# Patient Record
Sex: FEMALE | Race: BLACK OR AFRICAN AMERICAN | NOT HISPANIC OR LATINO | Employment: OTHER | URBAN - METROPOLITAN AREA
[De-identification: names, ages, dates, MRNs, and addresses within clinical notes are randomized per-mention and may not be internally consistent; named-entity substitution may affect disease eponyms.]

---

## 2023-04-26 ENCOUNTER — HOSPITAL ENCOUNTER (OUTPATIENT)
Dept: DATA CONVERSION | Facility: HOSPITAL | Age: 63
End: 2023-04-26
Attending: INTERNAL MEDICINE | Admitting: INTERNAL MEDICINE

## 2023-04-26 DIAGNOSIS — D12.2 BENIGN NEOPLASM OF ASCENDING COLON: ICD-10-CM

## 2023-04-26 DIAGNOSIS — J44.9 CHRONIC OBSTRUCTIVE PULMONARY DISEASE, UNSPECIFIED (MULTI): ICD-10-CM

## 2023-04-26 DIAGNOSIS — Z88.0 ALLERGY STATUS TO PENICILLIN: ICD-10-CM

## 2023-04-26 DIAGNOSIS — Z86.010 PERSONAL HISTORY OF COLONIC POLYPS: ICD-10-CM

## 2023-04-26 DIAGNOSIS — E03.9 HYPOTHYROIDISM, UNSPECIFIED: ICD-10-CM

## 2023-04-26 DIAGNOSIS — G47.33 OBSTRUCTIVE SLEEP APNEA (ADULT) (PEDIATRIC): ICD-10-CM

## 2023-04-26 DIAGNOSIS — Z86.73 PERSONAL HISTORY OF TRANSIENT ISCHEMIC ATTACK (TIA), AND CEREBRAL INFARCTION WITHOUT RESIDUAL DEFICITS: ICD-10-CM

## 2023-04-26 DIAGNOSIS — Z12.11 ENCOUNTER FOR SCREENING FOR MALIGNANT NEOPLASM OF COLON: ICD-10-CM

## 2023-04-26 DIAGNOSIS — Z15.09 GENETIC SUSCEPTIBILITY TO OTHER MALIGNANT NEOPLASM: ICD-10-CM

## 2023-04-26 DIAGNOSIS — F41.9 ANXIETY DISORDER, UNSPECIFIED: ICD-10-CM

## 2023-04-26 DIAGNOSIS — F32.A DEPRESSION, UNSPECIFIED: ICD-10-CM

## 2023-04-26 DIAGNOSIS — E66.01 MORBID (SEVERE) OBESITY DUE TO EXCESS CALORIES (MULTI): ICD-10-CM

## 2023-04-26 DIAGNOSIS — K64.0 FIRST DEGREE HEMORRHOIDS: ICD-10-CM

## 2023-05-02 LAB
COMPLETE PATHOLOGY REPORT: NORMAL
CONVERTED CLINICAL DIAGNOSIS-HISTORY: NORMAL
CONVERTED FINAL DIAGNOSIS: NORMAL
CONVERTED FINAL REPORT PDF LINK TO COPY AND PASTE: NORMAL
CONVERTED GROSS DESCRIPTION: NORMAL

## 2023-05-22 ENCOUNTER — HOSPITAL ENCOUNTER (OUTPATIENT)
Dept: DATA CONVERSION | Facility: HOSPITAL | Age: 63
End: 2023-05-22
Attending: INTERNAL MEDICINE | Admitting: INTERNAL MEDICINE

## 2023-05-22 DIAGNOSIS — G47.33 OBSTRUCTIVE SLEEP APNEA (ADULT) (PEDIATRIC): ICD-10-CM

## 2023-05-22 DIAGNOSIS — K63.5 POLYP OF COLON: ICD-10-CM

## 2023-05-22 DIAGNOSIS — Z86.73 PERSONAL HISTORY OF TRANSIENT ISCHEMIC ATTACK (TIA), AND CEREBRAL INFARCTION WITHOUT RESIDUAL DEFICITS: ICD-10-CM

## 2023-05-22 DIAGNOSIS — F41.8 OTHER SPECIFIED ANXIETY DISORDERS: ICD-10-CM

## 2023-05-22 DIAGNOSIS — F31.9 BIPOLAR DISORDER, UNSPECIFIED (MULTI): ICD-10-CM

## 2023-05-22 DIAGNOSIS — E66.01 MORBID (SEVERE) OBESITY DUE TO EXCESS CALORIES (MULTI): ICD-10-CM

## 2023-05-22 DIAGNOSIS — Q43.8 OTHER SPECIFIED CONGENITAL MALFORMATIONS OF INTESTINE: ICD-10-CM

## 2023-05-22 DIAGNOSIS — K44.9 DIAPHRAGMATIC HERNIA WITHOUT OBSTRUCTION OR GANGRENE: ICD-10-CM

## 2023-05-22 DIAGNOSIS — K64.9 UNSPECIFIED HEMORRHOIDS: ICD-10-CM

## 2023-05-22 DIAGNOSIS — E03.9 HYPOTHYROIDISM, UNSPECIFIED: ICD-10-CM

## 2023-05-22 DIAGNOSIS — K21.9 GASTRO-ESOPHAGEAL REFLUX DISEASE WITHOUT ESOPHAGITIS: ICD-10-CM

## 2023-05-22 DIAGNOSIS — K31.89 OTHER DISEASES OF STOMACH AND DUODENUM: ICD-10-CM

## 2023-05-22 DIAGNOSIS — I42.9 CARDIOMYOPATHY, UNSPECIFIED (MULTI): ICD-10-CM

## 2023-05-22 DIAGNOSIS — K21.00 GASTRO-ESOPHAGEAL REFLUX DISEASE WITH ESOPHAGITIS, WITHOUT BLEEDING: ICD-10-CM

## 2023-05-22 DIAGNOSIS — Z85.3 PERSONAL HISTORY OF MALIGNANT NEOPLASM OF BREAST: ICD-10-CM

## 2023-05-22 DIAGNOSIS — E78.00 PURE HYPERCHOLESTEROLEMIA, UNSPECIFIED: ICD-10-CM

## 2023-05-22 DIAGNOSIS — J44.9 CHRONIC OBSTRUCTIVE PULMONARY DISEASE, UNSPECIFIED (MULTI): ICD-10-CM

## 2023-05-22 DIAGNOSIS — Z98.84 BARIATRIC SURGERY STATUS: ICD-10-CM

## 2023-09-07 VITALS
TEMPERATURE: 97.9 F | RESPIRATION RATE: 18 BRPM | HEART RATE: 67 BPM | SYSTOLIC BLOOD PRESSURE: 125 MMHG | DIASTOLIC BLOOD PRESSURE: 89 MMHG

## 2023-09-07 VITALS
HEART RATE: 74 BPM | TEMPERATURE: 97.5 F | SYSTOLIC BLOOD PRESSURE: 105 MMHG | RESPIRATION RATE: 16 BRPM | DIASTOLIC BLOOD PRESSURE: 73 MMHG

## 2023-09-14 NOTE — H&P
History of Present Illness:   History Present Illness:  Reason for surgery: Positive mutation with MUTYH   HPI:    Patient was referred to GI for colonoscopy due to Positive mutation with MUTYH/history of colon polyp    Allergies:        Allergies:  ·  amoxicillin : Itching  ·  tetanus  immune globulin: Swelling/Edema    Home Medication Review:   Home Medications Reviewed: yes     Impression/Procedure:   ·  Impression and Planned Procedure: Colonoscopy       ERAS (Enhanced Recovery After Surgery):  ·  ERAS Patient: no       Vital Signs:  Temperature C: 36.4 degrees C   Temperature F: 97.5 degrees F   Heart Rate: 74 beats per minute   Respiratory Rate: 16 breath per minute   Blood Pressure Systolic: 105 mm/Hg   Blood Pressure Diastolic: 73 mm/Hg     Physical Exam by System:    Respiratory/Thorax: Patent airways, CTAB, normal  breath sounds with good chest expansion, thorax symmetric   Cardiovascular: Regular, rate and rhythm, no murmurs,  2+ equal pulses of the extremities, normal S 1and S 2     Consent:   COVID-19 Consent:  ·  COVID-19 Risk Consent Surgeon has reviewed key risks related to the risk of ila COVID-19 and if they contract COVID-19 what the risks are.       Electronic Signatures:  Genevieve Villa)  (Signed 26-Apr-2023 11:42)   Authored: History of Present Illness, Allergies, Home  Medication Review, Impression/Procedure, ERAS, Physical Exam, Consent, Note Completion      Last Updated: 26-Apr-2023 11:42 by Genevieve Villa)

## 2023-09-30 NOTE — H&P
History of Present Illness:   History Present Illness:  Reason for surgery: Mild GERD, SP sleeve gastrectomy,  positive mutation of MUTYH gene   HPI:    Patient with mild GERD, SP sleeve gastrectomy, positive mutation of MUTYH gene, scheduled for EGD to rule out gastroduodenal polyp    Allergies:        Allergies:  ·  amoxicillin : Itching  ·  tetanus  immune globulin: Swelling/Edema    Home Medication Review:   Home Medications Reviewed: yes     Impression/Procedure:   ·  Impression and Planned Procedure: EGD       ERAS (Enhanced Recovery After Surgery):  ·  ERAS Patient: no       Vital Signs:  Temperature C: 36.6 degrees C   Temperature F: 97.8 degrees F   Heart Rate: 67 beats per minute   Respiratory Rate: 18 breath per minute   Blood Pressure Systolic: 125 mm/Hg   Blood Pressure Diastolic: 89 mm/Hg     Physical Exam by System:    Respiratory/Thorax: Patent airways, CTAB, normal  breath sounds with good chest expansion, thorax symmetric   Cardiovascular: Regular, rate and rhythm, no murmurs,  2+ equal pulses of the extremities, normal S 1and S 2     Consent:   COVID-19 Consent:  ·  COVID-19 Risk Consent Surgeon has reviewed key risks related to the risk of ila COVID-19 and if they contract COVID-19 what the risks are.       Electronic Signatures:  Genevieve Villa)  (Signed 22-May-2023 13:48)   Authored: History of Present Illness, Allergies, Home  Medication Review, Impression/Procedure, ERAS, Physical Exam, Consent, Note Completion      Last Updated: 22-May-2023 13:48 by Genevieve Villa)

## 2024-03-13 PROBLEM — R87.619 ABNORMAL PAP SMEAR OF CERVIX: Status: ACTIVE | Noted: 2024-03-13

## 2024-03-13 PROBLEM — I67.9 CEREBROVASCULAR DISEASE: Status: ACTIVE | Noted: 2024-03-13

## 2024-03-13 PROBLEM — I10 HYPERTENSION: Status: ACTIVE | Noted: 2024-03-13

## 2024-03-13 PROBLEM — M70.21 OLECRANON BURSITIS OF RIGHT ELBOW: Status: ACTIVE | Noted: 2024-03-13

## 2024-03-13 PROBLEM — R42 VERTIGO: Status: ACTIVE | Noted: 2024-03-13

## 2024-03-13 PROBLEM — R55 SYNCOPE: Status: ACTIVE | Noted: 2024-03-13

## 2024-03-13 PROBLEM — T63.441A BEE STING: Status: ACTIVE | Noted: 2024-03-13

## 2024-03-13 PROBLEM — R82.90 ABNORMAL URINALYSIS: Status: ACTIVE | Noted: 2024-03-13

## 2024-03-13 PROBLEM — H53.9 DISORDER OF VISION: Status: ACTIVE | Noted: 2024-03-13

## 2024-03-13 PROBLEM — R06.00 DYSPNEA: Status: ACTIVE | Noted: 2024-03-13

## 2024-03-13 PROBLEM — J20.9 ACUTE BRONCHITIS: Status: ACTIVE | Noted: 2024-03-13

## 2024-03-13 PROBLEM — R60.9 EDEMA: Status: ACTIVE | Noted: 2024-03-13

## 2024-03-13 PROBLEM — D69.2: Status: ACTIVE | Noted: 2024-03-13

## 2024-03-13 PROBLEM — N95.0 POSTMENOPAUSAL BLEEDING: Status: ACTIVE | Noted: 2024-03-13

## 2024-03-13 PROBLEM — M00.9: Status: ACTIVE | Noted: 2024-03-13

## 2024-03-13 PROBLEM — G47.33 OSA (OBSTRUCTIVE SLEEP APNEA): Status: ACTIVE | Noted: 2024-03-13

## 2024-03-13 PROBLEM — D32.9 MENINGIOMA (MULTI): Status: ACTIVE | Noted: 2024-03-13

## 2024-03-13 PROBLEM — N20.0 BILATERAL KIDNEY STONES: Status: ACTIVE | Noted: 2024-03-13

## 2024-03-13 PROBLEM — R20.0 FACIAL NUMBNESS: Status: ACTIVE | Noted: 2024-03-13

## 2024-03-13 PROBLEM — R11.2 NAUSEA WITH VOMITING: Status: ACTIVE | Noted: 2024-03-13

## 2024-03-13 PROBLEM — Z98.84 S/P BARIATRIC SURGERY: Status: ACTIVE | Noted: 2024-03-13

## 2024-03-13 PROBLEM — M54.2 CERVICALGIA: Status: ACTIVE | Noted: 2024-03-13

## 2024-03-13 PROBLEM — L03.90 CELLULITIS: Status: ACTIVE | Noted: 2024-03-13

## 2024-03-13 PROBLEM — R53.1 ASTHENIA: Status: ACTIVE | Noted: 2024-03-13

## 2024-03-13 PROBLEM — R07.9 CHEST PAIN: Status: ACTIVE | Noted: 2024-03-13

## 2024-03-13 PROBLEM — R31.9 HEMATURIA: Status: ACTIVE | Noted: 2024-03-13

## 2024-03-13 PROBLEM — B97.7 HUMAN PAPILLOMA VIRUS (HPV) INFECTION: Status: ACTIVE | Noted: 2024-03-13

## 2024-03-13 PROBLEM — R30.0 DYSURIA: Status: ACTIVE | Noted: 2024-03-13

## 2024-03-13 PROBLEM — R19.7 DIARRHEA: Status: ACTIVE | Noted: 2024-03-13

## 2024-03-13 PROBLEM — M94.9 DISORDER OF BONE AND ARTICULAR CARTILAGE: Status: ACTIVE | Noted: 2024-03-13

## 2024-03-13 PROBLEM — L02.429 BOIL, AXILLA: Status: ACTIVE | Noted: 2024-03-13

## 2024-03-13 PROBLEM — E53.8 VITAMIN B12 DEFICIENCY: Status: ACTIVE | Noted: 2024-03-13

## 2024-03-13 PROBLEM — C50.919 BREAST CANCER (MULTI): Status: ACTIVE | Noted: 2024-03-13

## 2024-03-13 PROBLEM — G45.9 TIA (TRANSIENT ISCHEMIC ATTACK): Status: ACTIVE | Noted: 2023-08-02

## 2024-03-13 PROBLEM — K62.5 BRIGHT RED BLOOD PER RECTUM: Status: ACTIVE | Noted: 2024-03-13

## 2024-03-13 PROBLEM — F17.200 NICOTINE DEPENDENCE: Status: ACTIVE | Noted: 2024-03-13

## 2024-03-13 PROBLEM — F32.A DEPRESSION: Status: ACTIVE | Noted: 2024-03-13

## 2024-03-13 PROBLEM — R35.0 URINARY FREQUENCY: Status: ACTIVE | Noted: 2024-03-13

## 2024-03-13 PROBLEM — I51.7 ENLARGED HEART: Status: ACTIVE | Noted: 2024-03-13

## 2024-03-13 PROBLEM — E55.9 MILD VITAMIN D DEFICIENCY: Status: ACTIVE | Noted: 2024-03-13

## 2024-03-13 PROBLEM — F41.9 ANXIETY: Status: ACTIVE | Noted: 2024-03-13

## 2024-03-13 PROBLEM — I42.9 CARDIOMYOPATHY (MULTI): Status: ACTIVE | Noted: 2024-03-13

## 2024-03-13 PROBLEM — M89.9 DISORDER OF BONE AND ARTICULAR CARTILAGE: Status: ACTIVE | Noted: 2024-03-13

## 2024-03-13 PROBLEM — R06.83 SNORING: Status: ACTIVE | Noted: 2024-03-13

## 2024-03-13 PROBLEM — E66.9 OBESITY: Status: ACTIVE | Noted: 2024-03-13

## 2024-03-13 PROBLEM — E78.00 HYPERCHOLESTEREMIA: Status: ACTIVE | Noted: 2024-03-13
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
## 2024-03-13 PROBLEM — J44.9 COPD (CHRONIC OBSTRUCTIVE PULMONARY DISEASE) (MULTI): Status: ACTIVE | Noted: 2024-03-13

## 2024-03-13 PROBLEM — K21.9 GASTROESOPHAGEAL REFLUX DISEASE: Status: ACTIVE | Noted: 2024-03-13

## 2024-03-13 PROBLEM — R87.810 CERVICAL HIGH RISK HPV (HUMAN PAPILLOMAVIRUS) TEST POSITIVE: Status: ACTIVE | Noted: 2024-03-13

## 2024-03-13 PROBLEM — E03.9 HYPOTHYROIDISM: Status: ACTIVE | Noted: 2024-03-13

## 2024-03-13 RX ORDER — ATORVASTATIN CALCIUM 80 MG/1
TABLET, FILM COATED ORAL
COMMUNITY
End: 2024-03-25 | Stop reason: ALTCHOICE

## 2024-03-13 RX ORDER — LEVOFLOXACIN 250 MG/1
TABLET ORAL
COMMUNITY
Start: 2022-09-28 | End: 2024-03-25 | Stop reason: ALTCHOICE

## 2024-03-13 RX ORDER — ATORVASTATIN CALCIUM 20 MG/1
1 TABLET, FILM COATED ORAL DAILY
COMMUNITY
Start: 2022-04-18 | End: 2024-03-25 | Stop reason: ALTCHOICE

## 2024-03-13 RX ORDER — LOSARTAN POTASSIUM 25 MG/1
1 TABLET ORAL DAILY
COMMUNITY
Start: 2021-08-17 | End: 2024-03-25 | Stop reason: ALTCHOICE

## 2024-03-13 RX ORDER — VIT C/E/ZN/COPPR/LUTEIN/ZEAXAN 250MG-90MG
CAPSULE ORAL
COMMUNITY
End: 2024-03-25 | Stop reason: ALTCHOICE

## 2024-03-13 RX ORDER — ACETAMINOPHEN 500 MG
TABLET ORAL 2 TIMES DAILY
COMMUNITY
End: 2024-03-25 | Stop reason: ALTCHOICE

## 2024-03-13 RX ORDER — MECLIZINE HYDROCHLORIDE 25 MG/1
1 TABLET ORAL 3 TIMES DAILY
COMMUNITY
Start: 2022-04-18 | End: 2024-03-25 | Stop reason: ALTCHOICE

## 2024-03-13 RX ORDER — ATORVASTATIN CALCIUM 40 MG/1
40 TABLET, FILM COATED ORAL
COMMUNITY
Start: 2023-08-04 | End: 2024-03-25 | Stop reason: ALTCHOICE

## 2024-03-13 RX ORDER — LURASIDONE HYDROCHLORIDE 40 MG/1
TABLET, FILM COATED ORAL
COMMUNITY
End: 2024-03-25 | Stop reason: ALTCHOICE

## 2024-03-13 RX ORDER — MULTIVIT WITH IRON,MINERALS
TABLET,CHEWABLE ORAL
COMMUNITY
End: 2024-03-25 | Stop reason: ALTCHOICE

## 2024-03-13 RX ORDER — SUMATRIPTAN 50 MG/1
TABLET, FILM COATED ORAL
COMMUNITY
Start: 2019-05-29 | End: 2024-03-25 | Stop reason: ALTCHOICE

## 2024-03-13 RX ORDER — NITROFURANTOIN 25; 75 MG/1; MG/1
CAPSULE ORAL
COMMUNITY
Start: 2022-08-30 | End: 2024-03-25 | Stop reason: ALTCHOICE

## 2024-03-13 RX ORDER — ONDANSETRON 4 MG/1
TABLET, FILM COATED ORAL
COMMUNITY
Start: 2022-08-30 | End: 2024-03-25 | Stop reason: ALTCHOICE

## 2024-03-13 RX ORDER — CIPROFLOXACIN 500 MG/1
1 TABLET ORAL EVERY 12 HOURS
COMMUNITY
Start: 2022-09-02 | End: 2024-03-25 | Stop reason: ALTCHOICE

## 2024-03-25 ENCOUNTER — OFFICE VISIT (OUTPATIENT)
Dept: HEMATOLOGY/ONCOLOGY | Facility: CLINIC | Age: 64
End: 2024-03-25
Payer: COMMERCIAL

## 2024-03-25 VITALS
RESPIRATION RATE: 16 BRPM | DIASTOLIC BLOOD PRESSURE: 76 MMHG | TEMPERATURE: 97.5 F | WEIGHT: 201.72 LBS | HEART RATE: 74 BPM | BODY MASS INDEX: 33.61 KG/M2 | SYSTOLIC BLOOD PRESSURE: 116 MMHG | HEIGHT: 65 IN | OXYGEN SATURATION: 95 %

## 2024-03-25 DIAGNOSIS — C50.912 MALIGNANT NEOPLASM OF LEFT BREAST IN FEMALE, ESTROGEN RECEPTOR POSITIVE, UNSPECIFIED SITE OF BREAST (MULTI): Primary | ICD-10-CM

## 2024-03-25 DIAGNOSIS — Z17.0 MALIGNANT NEOPLASM OF LEFT BREAST IN FEMALE, ESTROGEN RECEPTOR POSITIVE, UNSPECIFIED SITE OF BREAST (MULTI): Primary | ICD-10-CM

## 2024-03-25 PROCEDURE — 99214 OFFICE O/P EST MOD 30 MIN: CPT | Performed by: PHYSICIAN ASSISTANT

## 2024-03-25 PROCEDURE — 3078F DIAST BP <80 MM HG: CPT | Performed by: PHYSICIAN ASSISTANT

## 2024-03-25 PROCEDURE — 3074F SYST BP LT 130 MM HG: CPT | Performed by: PHYSICIAN ASSISTANT

## 2024-03-25 PROCEDURE — 3008F BODY MASS INDEX DOCD: CPT | Performed by: PHYSICIAN ASSISTANT

## 2024-03-25 ASSESSMENT — ENCOUNTER SYMPTOMS
OCCASIONAL FEELINGS OF UNSTEADINESS: 0
DEPRESSION: 0
LOSS OF SENSATION IN FEET: 0

## 2024-03-25 ASSESSMENT — PAIN SCALES - GENERAL: PAINLEVEL: 0-NO PAIN

## 2024-03-25 NOTE — PROGRESS NOTES
Oncology History:  Patient is a 62-year-old female referred to see me at the [facility].  The current treatment is currently observation and my recommendation is to refer her to survivors clinic.    2003 patient was diagnosed with a T2N2 invasive ductal carcinoma with 3 out of 21 lymph nodes involved.  She received neoadjuvant chemotherapy with 6 cycles of Adriamycin 5-FU and Cytoxan.  Patient proceeded to have bilateral mastectomies.  Her primary tumor was in the left breast in 2004.  A residual 1 cm invasive ductal carcinoma was removed from the left breast to 3 out of 21 lymph nodes were involved.  Patient eventually had completed healing in construction with a TRAM flap in the left breast.  She has finished 5 years of Aromasin therapy.    Record indicates that CT scan of the chest date of visit  in May showed nonspecific lung nodules.  MRI of the brain revealed patchy confluent areas of abnormal signal with possible primary demyelinating process seen.  Neurology was consulted and she has followed with neurology in 2018 since.  Her boyfriend  of metastatic disease to the brain and patient continues to smoke.    She has a meningioma in the head which is being followed.  Her last colonoscopy was .  Genetic testing reveals MUTYH gene and also a gene of uncertain significance in the JASON gene.  Smokes at least a pack a day.    Patient continues to smoke.  She is anxious about the possibility of metastatic cancer.  Her last colonoscopy was  patient has been referred for colonoscopy with GI evaluation.  She is currently more than 10 years since her diagnosis of breast cancer.  She is being referred to the survivors clinic.    History of Present Illness:   Patient presents for follow up. Patient continues to smoke. She is anxious about the possibility of metastatic cancer. Her last colonoscopy was , phuong found c/w TA. Otherwise doing well.     Review of Systems:   All of  the systems have been reviewed and are negative for complaints except what is stated in the HPI and/or past medical history.    Allergies and Intolerances:       Allergies:   amoxicillin: Drug, Itching, Active   tetanus immune globulin: Drug, Swelling/Edema, Active    Current Outpatient Medications on File Prior to Visit   Medication Sig Dispense Refill    [DISCONTINUED] atorvastatin (Lipitor) 20 mg tablet Take 1 tablet (20 mg) by mouth once daily.      [DISCONTINUED] atorvastatin (Lipitor) 40 mg tablet Take 1 tablet (40 mg) by mouth.      [DISCONTINUED] ciprofloxacin (Cipro) 500 mg tablet Take 1 tablet (500 mg) by mouth every 12 hours.      [DISCONTINUED] levoFLOXacin (Levaquin) 250 mg tablet Take by mouth.      [DISCONTINUED] losartan (Cozaar) 25 mg tablet Take 1 tablet (25 mg) by mouth once daily.      [DISCONTINUED] meclizine (Antivert) 25 mg tablet Take 1 tablet (25 mg) by mouth 3 times a day.      [DISCONTINUED] nitrofurantoin, macrocrystal-monohydrate, (Macrobid) 100 mg capsule Take by mouth.      [DISCONTINUED] ondansetron (Zofran) 4 mg tablet Take by mouth.      [DISCONTINUED] SUMAtriptan (Imitrex) 50 mg tablet Take by mouth.      [DISCONTINUED] atorvastatin (Lipitor) 80 mg tablet Take by mouth.      [DISCONTINUED] calcium carbonate-vitamin D3 600 mg-20 mcg (800 unit) tablet Take by mouth twice a day.      [DISCONTINUED] cholecalciferol (Vitamin D-3) 25 MCG (1000 UT) capsule Take by mouth.      [DISCONTINUED] lurasidone (Latuda) 40 mg tablet Take by mouth.      [DISCONTINUED] multivitamin-children's (Flintstones Complete, iron,) chewable tablet Chew.       No current facility-administered medications on file prior to visit.          Medical History:   Overweight and obesity: ICD-10: E66.3, Status: Active   History of cancer of left breast: ICD-10: Z85.3, Status: Active, Description: 2003 s/p bilateral mastectomy / chemo / radiation   COPD (chronic obstructive pulmonary disease): ICD-10: J44.9, Status:  Active   Bipolar disorder: ICD-10: F31.9, Status: Active   History of kidney stones: ICD-10: Z87.442, Status: Active, Description: found on CT 5/2017   Morbid obesity: ICD-10: E66.01, Status: Active   Cardiomyopathy: ICD-10: I42.9, Status: Active, Description: per cath 2007   Acid reflux: ICD-10: K21.9, Status: Active   Anxiety and depression: ICD-10: F41.9, Status: Active   Hypercholesterolemia: ICD-10: E78.00, Status: Active   Hypothyroid: ICD-10: E03.9, Status: Active, Description: sates never on medications   Menopause: ICD-10: Z78.0, Status: Active   Obstructive sleep apnea syndrome in adult: ICD-10: G47.33, Status: Active, Description: no CPAP since weight loss   Short of breath on exertion: ICD-10: R06.02, Status: Active   Snoring: ICD-10: R06.83, Status: Active   Transient cerebral ischemia: ICD-10: G45.9, Status: Active, Description: ~2014   Breast cancer: ICD-10: C50.919, Status: Active       Surg History:   S/P laparoscopic sleeve gastrectomy: ICD-10: Z98.84, Status: Active, Description: 11/2016   H/O total knee replacement: ICD-10: Z96.659, Status: Active, Description: left 6/2009   History of breast reconstruction: ICD-10: Z98.82, Status: Active, Description: bilateral breast expanders with subsequent debridement/removal left   6/2007   History of cholecystectomy: ICD-10: Z90.49, Status: Active   History of knee surgery: ICD-10: Z98.890, Status: Active   History of bilateral mastectomy: ICD-10: Z90.13, Status: Active, Description: 2003    Family History: No Family History items are recorded in the problem list.     Social History:   Social Substance History:  · Smoking Status current every day smoker   · Tobacco Use daily     Assessment and Plan:   62-year-old female diagnosed 2003 T2N2 invasive ductal carcinoma: Treated with neoadjuvant chemotherapy 6 cycles of Adriamycin 5-FU and Cytoxan followed by bilateral mastectomy.  Completed 5 years of Aromasin therapy.  Has since not had any evidence of  disease recurrence.     Patient would like to continue to follow up with oncology.    Recommend she F/U with PCP and obtain yearly low dose CT chest    Needs C-scope in 4/2025; F/U w/GI.    Referral to cardiology to address low EF, h/o TIA and smoking and high cholesterol.     RTC in 1 year    Patient verbalized understanding, and all her questions were answered to her satisfaction    30 min spent with patient greater than 50 % of which was spent in consultation and coordination of care.

## 2024-03-27 ENCOUNTER — APPOINTMENT (OUTPATIENT)
Dept: CARDIOLOGY | Facility: CLINIC | Age: 64
End: 2024-03-27
Payer: COMMERCIAL

## 2024-04-10 ENCOUNTER — OFFICE VISIT (OUTPATIENT)
Dept: CARDIOLOGY | Facility: HOSPITAL | Age: 64
End: 2024-04-10
Payer: COMMERCIAL

## 2024-04-10 VITALS
OXYGEN SATURATION: 96 % | WEIGHT: 202.16 LBS | BODY MASS INDEX: 33.6 KG/M2 | SYSTOLIC BLOOD PRESSURE: 130 MMHG | DIASTOLIC BLOOD PRESSURE: 90 MMHG | HEART RATE: 75 BPM

## 2024-04-10 DIAGNOSIS — I42.9 CARDIOMYOPATHY, UNSPECIFIED TYPE (MULTI): ICD-10-CM

## 2024-04-10 DIAGNOSIS — R94.31 ABNORMAL EKG: ICD-10-CM

## 2024-04-10 DIAGNOSIS — I10 HYPERTENSION, UNSPECIFIED TYPE: ICD-10-CM

## 2024-04-10 DIAGNOSIS — F41.8 OTHER SPECIFIED ANXIETY DISORDERS: ICD-10-CM

## 2024-04-10 DIAGNOSIS — R42 DIZZINESS: ICD-10-CM

## 2024-04-10 DIAGNOSIS — R07.9 CHEST PAIN, UNSPECIFIED TYPE: ICD-10-CM

## 2024-04-10 DIAGNOSIS — Z17.0 MALIGNANT NEOPLASM OF LEFT BREAST IN FEMALE, ESTROGEN RECEPTOR POSITIVE, UNSPECIFIED SITE OF BREAST (MULTI): Primary | ICD-10-CM

## 2024-04-10 DIAGNOSIS — C50.912 MALIGNANT NEOPLASM OF LEFT BREAST IN FEMALE, ESTROGEN RECEPTOR POSITIVE, UNSPECIFIED SITE OF BREAST (MULTI): Primary | ICD-10-CM

## 2024-04-10 DIAGNOSIS — R06.09 DOE (DYSPNEA ON EXERTION): ICD-10-CM

## 2024-04-10 PROBLEM — I42.8 NICM (NONISCHEMIC CARDIOMYOPATHY) (MULTI): Status: ACTIVE | Noted: 2024-03-13

## 2024-04-10 PROCEDURE — 3075F SYST BP GE 130 - 139MM HG: CPT | Performed by: STUDENT IN AN ORGANIZED HEALTH CARE EDUCATION/TRAINING PROGRAM

## 2024-04-10 PROCEDURE — 99214 OFFICE O/P EST MOD 30 MIN: CPT | Mod: 25 | Performed by: STUDENT IN AN ORGANIZED HEALTH CARE EDUCATION/TRAINING PROGRAM

## 2024-04-10 PROCEDURE — 99204 OFFICE O/P NEW MOD 45 MIN: CPT | Performed by: STUDENT IN AN ORGANIZED HEALTH CARE EDUCATION/TRAINING PROGRAM

## 2024-04-10 PROCEDURE — 93005 ELECTROCARDIOGRAM TRACING: CPT | Performed by: STUDENT IN AN ORGANIZED HEALTH CARE EDUCATION/TRAINING PROGRAM

## 2024-04-10 PROCEDURE — 3080F DIAST BP >= 90 MM HG: CPT | Performed by: STUDENT IN AN ORGANIZED HEALTH CARE EDUCATION/TRAINING PROGRAM

## 2024-04-10 PROCEDURE — 3008F BODY MASS INDEX DOCD: CPT | Performed by: STUDENT IN AN ORGANIZED HEALTH CARE EDUCATION/TRAINING PROGRAM

## 2024-04-10 RX ORDER — CARVEDILOL 3.12 MG/1
3.12 TABLET ORAL
Qty: 60 TABLET | Refills: 11 | Status: SHIPPED | OUTPATIENT
Start: 2024-04-10 | End: 2025-04-10

## 2024-04-10 RX ORDER — ATORVASTATIN CALCIUM 40 MG/1
40 TABLET, FILM COATED ORAL DAILY
Qty: 90 TABLET | Refills: 3 | Status: SHIPPED | OUTPATIENT
Start: 2024-04-10 | End: 2025-04-10

## 2024-04-10 NOTE — PROGRESS NOTES
Primary Care Physician: Ashley Bradley MD   Date of Visit: 04/10/2024  1:20 PM EDT  Type of Visit: new      Chief Complaint:  No chief complaint on file.       HPI  Gavi Tejeda 63 y.o. female with hx of NICM, based on cath done 2007, LVEF 30% and last reported LVEF 2020 was 40-45%, normal stress nuc 2018, HTN, COPD, MERRY, obestuy s/p bariatric surgery, smoker, TIA, cancer breast treated with neoadjuvant chemotherapy 6 cycles of Adriamycin 5-FU and Cytoxan followed by bilateral mastectomy, then finished 5 years Aromasin therapy referred to establish care    She gets intermittent chest tightness, aches, lasts for few secs. She thinks its anxiety. She feels it couple times a month  Sometimes at rest and sometimes with exercise.  On couple espides it was bad that she cried from it   She goes to the gym, fairly active, she gets sob sometimes with exertion    She quit taking BP meds   She quit all her meds as she said all meds make her sick     No le edema    No orthopnea or pnd  No change in weight     Not taking aspirin   Still smokes, Trying to quit    Review of Systems   Review of Systems   12 points review of systems are negative expect for the above    Social History:  Social History     Socioeconomic History    Marital status:      Spouse name: Not on file    Number of children: Not on file    Years of education: Not on file    Highest education level: Not on file   Occupational History    Not on file   Tobacco Use    Smoking status: Every Day     Types: Cigarettes     Passive exposure: Current    Smokeless tobacco: Never   Substance and Sexual Activity    Alcohol use: Not Currently    Drug use: Not on file    Sexual activity: Not on file   Other Topics Concern    Not on file   Social History Narrative    Not on file     Social Determinants of Health     Financial Resource Strain: Not on file   Food Insecurity: Not on file   Transportation Needs: Not on file   Physical Activity: Not on file   Stress: Not  on file   Social Connections: Not on file   Intimate Partner Violence: Not on file   Housing Stability: Not on file        Past Medical History:  Past Medical History:   Diagnosis Date    Bariatric surgery status 01/09/2017    S/P bariatric surgery    Encounter for preprocedural laboratory examination 04/23/2019    Blood tests prior to treatment or procedure    Encounter for screening for malignant neoplasm of colon 05/07/2018    Encounter for screening colonoscopy    Morbid (severe) obesity due to excess calories (CMS/HCC) 04/11/2017    Morbid obesity with BMI of 45.0-49.9, adult    Nausea with vomiting, unspecified 03/03/2017    Post-operative nausea and vomiting    Nausea with vomiting, unspecified 11/11/2016    Post-operative nausea and vomiting    Other acute postprocedural pain 04/11/2017    Acute post-operative pain    Other conditions influencing health status     Breast Cancer    Personal history of nicotine dependence 05/07/2018    History of nicotine dependence       Past Surgical History:  Past Surgical History:   Procedure Laterality Date    GALLBLADDER SURGERY  04/24/2013    Gallbladder Surgery    KNEE SURGERY  04/24/2013    Knee Surgery Left    MASTECTOMY  05/24/2013    Breast Surgery Mastectomy    MR HEAD ANGIO WO IV CONTRAST  9/19/2019    MR HEAD ANGIO WO IV CONTRAST [facility]    OTHER SURGICAL HISTORY  01/09/2017    Gastric Surgery For Morbid Obesity Laparoscopic Longitudinal Gastrectomy    OTHER SURGICAL HISTORY  02/29/2016    Breast Surgery Reconstruction       Family History:  No family history on file.     Objective:       9/2/2022    11:17 AM 3/27/2023     1:43 PM 4/5/2023    10:14 AM 4/26/2023    11:36 AM 5/22/2023     1:22 PM 3/25/2024    11:50 AM 4/10/2024     1:30 PM   Vitals   Systolic 122 112 135 105 125 116 130   Diastolic 76 73 83 73 89 76 90   Heart Rate  75 64 74 67 74 75   Temp  36.2 °C (97.2 °F)  36.4 °C (97.5 °F) 36.6 °C (97.9 °F) 36.4 °C (97.5 °F)    Resp  16  16 18 16  "   Height (in) 1.651 m (5' 5\") 1.636 m (5' 4.41\") 1.651 m (5' 5\")   1.652 m (5' 5.04\")     Weight (lb) 196 193.78 195   201.72 202.16   BMI 32.62 kg/m2 32.84 kg/m2 32.45 kg/m2   33.53 kg/m2 33.6 kg/m2   BSA (m2) 2.02 m2 2 m2 2.01 m2   2.05 m2 2.05 m2   Visit Report      Report Report       Significant value      Constitutional:       Appearance: Healthy appearance. Not in distress.   Neck:      Vascular: No JVR. JVD normal.   Pulmonary:      Effort: Pulmonary effort is normal.      Breath sounds: Normal breath sounds. No wheezing. No rhonchi. No rales.   Chest:      Chest wall: Not tender to palpatation.   Cardiovascular:      PMI at left midclavicular line. Normal rate. Regular rhythm. Normal S1. Normal S2.       Murmurs: There is no murmur.      No gallop.  No click. No rub.   Pulses:     Intact distal pulses.   Edema:     Peripheral edema absent.   Abdominal:      General: Bowel sounds are normal.      Palpations: Abdomen is soft.      Tenderness: There is no abdominal tenderness.   Musculoskeletal: Normal range of motion.         General: No tenderness.   Skin:     General: Skin is warm and dry.   Neurological:      General: No focal deficit present.      Mental Status: Alert and oriented to person, place and time.     Allergies:  Allergies   Allergen Reactions    Amoxicillin Itching     itching to palms and bottom of feet    Diph,Pertuss(Acel),Tet Vac(Pf) Swelling    Tetanus Immune Globulin Swelling     severe to arm where injections was given       Medications:  No current outpatient medications     Labs and Imaging:     Lab Results   Component Value Date    WBC 12.4 (H) 09/28/2022    HGB 14.2 09/28/2022    HCT 41.7 09/28/2022     09/28/2022    CHOL 252 (H) 09/08/2021    TRIG 123 09/08/2021    HDL 49.5 09/08/2021    ALT 36 08/30/2022    AST 28 08/30/2022     09/28/2022    K 4.1 09/28/2022     09/28/2022    CREATININE 0.9 09/28/2022    BUN 14 09/28/2022    CO2 21 (L) 09/28/2022    TSH 1.72 " 2021    INR 0.9 2019    HGBA1C 5.0 2023         Echocardiogram:   Echocardiogram     Narrative  PROCEDURE:         ECHOCARDIOGRAM 2-D M-MODE - [facility]  0010  REASON FOR EXAM: Chest Pain    RESULT:                        [facility]  Echocardiography Report    Pat.Name:  PADILLA COKER       Pat.ID:    [id number]  St.Date:   2019               Refer.MD:  ELVIRA FARMER  Exam Time: 7:55:00 AM              Study Type:Echocardiography  Height:    167cm                   Weight:    86.014kg  BSA:       1.95 m2                   Age:  1960,58Y  Sex:       FEMALE                  BP:        115/66  Sonogrphr: Deepa Guerin RDCS    Pat. Stat.:Inpatient  Room:      sd4  Reason for Study:cp  History / Clinical:Surgeries = JESSE MASTEC/CHEM/RAD, Chest Pain =  Checked, Syncope = Checked, Dizziness = Checked, TIA / Stroke =  Checked  Procedures:2D, M-mode, Doppler, Color Flow  Race:      CA    MEASUREMENTS:    2D  Left Ventricle  LVEF A4         45.5 %             LVESV A4        66.1 cc  LVEF A2         41.3 %             LVESV BP        67.5 cc  LngAxd A2       1.44 cm            LVESV           84.9 cc  LngAxd A4       1.61 cm  LVIDd           5.81 cm   (3.6-5.2)  LVEDV A2         117 cc  LVIDs           4.34 cm   (2.3-3.9)  LVEDV A4         120 cc            LVEF BP         44.7 %  LVEDV BP         122 cc            LV EF (Teich)   49.2 %    (55-75)  LVEDV            167 cc            LV%fs           25.3 %    (25-46)  LngAxs A2       6.93 cm            LV SV A2        48.3 cc  LngAxs A4       7.18 cm            LV SV A4          55 cc  LVESV A2        68.7 cc            LV SV           82.1 cc  Left Atrium  LA a-p           3.5 cm   (2.8-3.4) LA Vol          40.5 cc  Right Atrium  RA As           12.5 cm2  (8.3-19.5) RA Vol          31.2 cc  Ventricular Septum  IVSd             0.8 cm  LVPW  LVPWd          0.993 cm  Aorta  Ao Stj          2.64 cm   (2.3-2.9)  Ao Sin          3.24 cm    (2.5-3.3)  LVOT  LVOT               2 cm            LVOTArea        3.14 cm2  Ratios  IVS/LVPW       0.806  Right Ventricle  Right Ventricle  2.48 cm           Right Ventricle  2.36 cm  Major Axis      6.64 cm  LVAd 2C  LVAd 2C         32.1 cm2  LVAd A4  LVAd A4         33.7 cm2  LVAs 2C  LVAs 2C         23.2 cm2  LVAs A4  LVAs A4         23.2 cm2  LV Biplane  SV              54.5 cc  DOPPLER  AV For Flow/Valve Assess  AV pkVel         112 cm/s (100-170)  AV Forward Flow  AV pkPG            5 mmHg          Area (Eduardo)      1.87 cm2  (3-5)  MV Forward Flow  MV DeTm          278 ms            MV pkE          71.6 cm/s ()  MV P1/2t          81 ms   (30-60)  MV AP1/2t       2.72 cm2  (4-6)  MV pkA          45.3 cm/s          MV E/A           1.6  TV Regurg Flow  TV pkVel         181 cm/s          TV pkPG           13 mmHg  Right Ventricle  RVsys P           23 mmHg  LVOT  LVOTpkVel       66.8 cm/s () LVOTpkPG           2 mmHg  Right Atrium  RA Press          10 mmHg  Aortic Valve  Aortic Valve Ve   0.6  MMODE  Tricuspid Valve  TAPSE           2.33 cm      FINDINGS:    Procedural Information: Exam quality: fair  LV:       The left ventricular chamber size is mildly dilated. Mild  eccentric  Left ventricle hypertrophy. There is  mildl-moderately  decreased left ventricular systolic  function.  Estimated ejection fraction is 40-44%. There is  normal  ventricular diastolic filling observed.  RV:       The right ventricular cavity size is normal. The right  ventricular  global systolic function is normal.  LA:       The left atrial size is normal.  RA:       Right atrial cavity size is normal.  MICHAEL:     Pericardium is normal.  SVn:      Inferior vena cava is mildly enlarged.  AV:       Aortic valve is structurally normal. No evidence of  regurgitation.  MV:       Mitral valve is structurally normal. Mild mitral  regurgitation.  PV:       Pulmonic valve is not well visualized.  TV:       Tricuspid valve is  structurally normal. Mild tricuspid  regurgitation.  The right ventricular systolic pressure is  calculated  at 29mmHg. There is evidence of borderline  pulmonary  hypertension.        Exam quality: fair  The left ventricular chamber size is moderately dilated.  There is moderately decreased left ventricular systolic function.  Estimated ejection fraction is 40-44%.  The left ventricular chamber size is mildly dilated.  Mild eccentric Left ventricle hypertrophy.  There is mildl-moderately decreased left ventricular systolic  function.  Estimated ejection fraction is 40-44%.  The right ventricular global systolic function is normal.  Aortic valve is structurally normal. No evidence of regurgitation.  Mild mitral regurgitation.  Mild tricuspid regurgitation.  There is evidence of borderline pulmonary hypertension.  Signed 09/20/2019 04:17 PM  Ely Whipple MD    Original Interpreting Physician:   ELY WHIPPLE MD  Original Transcribed by/Date: COOPER   Sep 20 2019  4:17P  Original Electronically Signed by/Date: ELY WHIPPLE MD Sep 20 2019  7:55A    Addendum Interpreting Physician:  Addendum Transcribed by/Date: NO ADDENDUM  Addendum Electronically Signed by/Date:    Stress Testing: No results found for this or any previous visit from the past 1825 days.    Cardiac Catheterization: No results found for this or any previous visit from the past 1825 days.    Cardiac Scoring: No results found for this or any previous visit from the past 1825 days.    AAA : No results found for this or any previous visit from the past 1825 days.    OTHER: No results found for this or any previous visit from the past 1825 days.      The ASCVD Risk score (Jeanette DK, et al., 2019) failed to calculate for the following reasons:    Unable to determine if patient is Non-      Assessment/Plan:   1. NICM (nonischemic cardiomyopathy) (CMS/HCC)        2. Malignant neoplasm of left breast in female, estrogen receptor positive,  unspecified site of breast (CMS/HCC)  Referral to Cardiology      3. Hypertension, unspecified type  ECG 12 lead (Clinic Performed)         Gavi Tejeda 63 y.o. female with hx of NICM, based on cath done 2007, LVEF 30% and last reported LVEF 2020 was 40-45%, normal stress nuc 2018, HTN, COPD, MERRY, obesity s/p bariatric surgery/sleeve, smoker, TIA, cancer breast treated with neoadjuvant chemotherapy 6 cycles of Adriamycin 5-FU and Cytoxan followed by bilateral mastectomy, then finished 5 years Aromasin therapy referred to establish care    EKG with NSR and LVH with 2ry repol changes, PACs     She has intermittent chest pain, escobar, and dizziness    Plan  Recommend PCP follow up   Recommend taking meds   Get an echo   Get a stress nuc   Carotid US  Restart HI statin  Restart bb, will start coreg instead of metoprolol as she did not tolerate it in the past   Quit smoking, offered patches, she will do it on her own         Time Spent: I spent  minutes reviewing medical testing, obtaining medical history and counselling and educating on diagnosis and documenting clinical encounter.         ____________________________________________________________  Amish Burroughs MD   of Medicine  Division of Cardiovascular Medicine   [facility] [place]  [facility]

## 2024-04-11 ENCOUNTER — TELEPHONE (OUTPATIENT)
Dept: SURGERY | Facility: CLINIC | Age: 64
End: 2024-04-11
Payer: COMMERCIAL

## 2024-04-11 NOTE — TELEPHONE ENCOUNTER
Thank you for speaking with me earlier today & confirming your scheduled visit with Dr. Woodruff on 4/22/24 8:30 AM at [facility] (inside [facility], main floor in the Specialty Clinic).   Parking is available at a cost of $5.00 at the Main Entrance.   We look forward to seeing you, [place] Ayala, LPKATH[facility] Clinic Nurse.
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
## 2024-04-15 ENCOUNTER — APPOINTMENT (OUTPATIENT)
Dept: SURGERY | Facility: CLINIC | Age: 64
End: 2024-04-15
Payer: COMMERCIAL

## 2024-04-22 ENCOUNTER — OFFICE VISIT (OUTPATIENT)
Dept: SURGERY | Facility: CLINIC | Age: 64
End: 2024-04-22
Payer: COMMERCIAL

## 2024-04-22 ENCOUNTER — LAB (OUTPATIENT)
Dept: LAB | Facility: LAB | Age: 64
End: 2024-04-22
Payer: COMMERCIAL

## 2024-04-22 VITALS
HEART RATE: 85 BPM | WEIGHT: 202 LBS | BODY MASS INDEX: 33.57 KG/M2 | DIASTOLIC BLOOD PRESSURE: 77 MMHG | SYSTOLIC BLOOD PRESSURE: 132 MMHG

## 2024-04-22 DIAGNOSIS — I42.9 CARDIOMYOPATHY, UNSPECIFIED TYPE (MULTI): ICD-10-CM

## 2024-04-22 DIAGNOSIS — Z98.84 BARIATRIC SURGERY STATUS: ICD-10-CM

## 2024-04-22 DIAGNOSIS — K91.2 MALNUTRITION FOLLOWING GASTROINTESTINAL SURGERY (HHS-HCC): ICD-10-CM

## 2024-04-22 DIAGNOSIS — I10 HYPERTENSION, UNSPECIFIED TYPE: ICD-10-CM

## 2024-04-22 DIAGNOSIS — I10 HYPERTENSION, UNSPECIFIED TYPE: Primary | ICD-10-CM

## 2024-04-22 DIAGNOSIS — Z13.21 ENCOUNTER FOR VITAMIN DEFICIENCY SCREENING: ICD-10-CM

## 2024-04-22 DIAGNOSIS — Z98.84 STATUS POST BARIATRIC SURGERY: ICD-10-CM

## 2024-04-22 DIAGNOSIS — E46 MALNUTRITION, UNSPECIFIED TYPE (MULTI): ICD-10-CM

## 2024-04-22 DIAGNOSIS — R73.09 ALTERED GLUCOSE METABOLISM: ICD-10-CM

## 2024-04-22 DIAGNOSIS — E43 UNSPECIFIED SEVERE PROTEIN-CALORIE MALNUTRITION (MULTI): ICD-10-CM

## 2024-04-22 DIAGNOSIS — Z01.818 PREOPERATIVE CLEARANCE: ICD-10-CM

## 2024-04-22 DIAGNOSIS — F41.8 OTHER SPECIFIED ANXIETY DISORDERS: ICD-10-CM

## 2024-04-22 DIAGNOSIS — E78.00 HYPERCHOLESTEREMIA: ICD-10-CM

## 2024-04-22 DIAGNOSIS — R07.9 CHEST PAIN, UNSPECIFIED TYPE: ICD-10-CM

## 2024-04-22 DIAGNOSIS — K21.9 GASTROESOPHAGEAL REFLUX DISEASE, UNSPECIFIED WHETHER ESOPHAGITIS PRESENT: ICD-10-CM

## 2024-04-22 DIAGNOSIS — R06.09 DOE (DYSPNEA ON EXERTION): ICD-10-CM

## 2024-04-22 DIAGNOSIS — R94.31 ABNORMAL EKG: ICD-10-CM

## 2024-04-22 DIAGNOSIS — R42 DIZZINESS: ICD-10-CM

## 2024-04-22 LAB
25(OH)D3 SERPL-MCNC: 38 NG/ML (ref 30–100)
ALBUMIN SERPL BCP-MCNC: 4.2 G/DL (ref 3.4–5)
ALP SERPL-CCNC: 99 U/L (ref 33–136)
ALT SERPL W P-5'-P-CCNC: 15 U/L (ref 7–45)
ANION GAP SERPL CALC-SCNC: 12 MMOL/L (ref 10–20)
AST SERPL W P-5'-P-CCNC: 15 U/L (ref 9–39)
BASOPHILS # BLD AUTO: 0.05 X10*3/UL (ref 0–0.1)
BASOPHILS NFR BLD AUTO: 1.1 %
BILIRUB SERPL-MCNC: 0.5 MG/DL (ref 0–1.2)
BUN SERPL-MCNC: 14 MG/DL (ref 6–23)
CALCIUM SERPL-MCNC: 9.5 MG/DL (ref 8.6–10.6)
CHLORIDE SERPL-SCNC: 108 MMOL/L (ref 98–107)
CHOLEST SERPL-MCNC: 171 MG/DL (ref 0–199)
CHOLESTEROL/HDL RATIO: 3
CO2 SERPL-SCNC: 27 MMOL/L (ref 21–32)
CREAT SERPL-MCNC: 0.7 MG/DL (ref 0.5–1.05)
EGFRCR SERPLBLD CKD-EPI 2021: >90 ML/MIN/1.73M*2
EOSINOPHIL # BLD AUTO: 0.12 X10*3/UL (ref 0–0.7)
EOSINOPHIL NFR BLD AUTO: 2.6 %
ERYTHROCYTE [DISTWIDTH] IN BLOOD BY AUTOMATED COUNT: 12.4 % (ref 11.5–14.5)
FERRITIN SERPL-MCNC: 181 NG/ML (ref 8–150)
FOLATE SERPL-MCNC: 10.9 NG/ML
GLUCOSE SERPL-MCNC: 89 MG/DL (ref 74–99)
HCT VFR BLD AUTO: 41.2 % (ref 36–46)
HDLC SERPL-MCNC: 56.1 MG/DL
HGB BLD-MCNC: 14 G/DL (ref 12–16)
IMM GRANULOCYTES # BLD AUTO: 0.01 X10*3/UL (ref 0–0.7)
IMM GRANULOCYTES NFR BLD AUTO: 0.2 % (ref 0–0.9)
IRON SATN MFR SERPL: 28 % (ref 25–45)
IRON SERPL-MCNC: 93 UG/DL (ref 35–150)
LDLC SERPL CALC-MCNC: 97 MG/DL
LYMPHOCYTES # BLD AUTO: 1.16 X10*3/UL (ref 1.2–4.8)
LYMPHOCYTES NFR BLD AUTO: 25.1 %
MCH RBC QN AUTO: 31.5 PG (ref 26–34)
MCHC RBC AUTO-ENTMCNC: 34 G/DL (ref 32–36)
MCV RBC AUTO: 93 FL (ref 80–100)
MONOCYTES # BLD AUTO: 0.36 X10*3/UL (ref 0.1–1)
MONOCYTES NFR BLD AUTO: 7.8 %
NEUTROPHILS # BLD AUTO: 2.93 X10*3/UL (ref 1.2–7.7)
NEUTROPHILS NFR BLD AUTO: 63.2 %
NON HDL CHOLESTEROL: 115 MG/DL (ref 0–149)
NRBC BLD-RTO: 0 /100 WBCS (ref 0–0)
PLATELET # BLD AUTO: 185 X10*3/UL (ref 150–450)
POTASSIUM SERPL-SCNC: 4.3 MMOL/L (ref 3.5–5.3)
PROT SERPL-MCNC: 6.7 G/DL (ref 6.4–8.2)
RBC # BLD AUTO: 4.45 X10*6/UL (ref 4–5.2)
SODIUM SERPL-SCNC: 143 MMOL/L (ref 136–145)
TIBC SERPL-MCNC: 331 UG/DL (ref 240–445)
TRIGL SERPL-MCNC: 91 MG/DL (ref 0–149)
TSH SERPL-ACNC: 1.73 MIU/L (ref 0.44–3.98)
UIBC SERPL-MCNC: 238 UG/DL (ref 110–370)
VIT B12 SERPL-MCNC: 283 PG/ML (ref 211–911)
VLDL: 18 MG/DL (ref 0–40)
WBC # BLD AUTO: 4.6 X10*3/UL (ref 4.4–11.3)

## 2024-04-22 PROCEDURE — 82607 VITAMIN B-12: CPT

## 2024-04-22 PROCEDURE — 82525 ASSAY OF COPPER: CPT

## 2024-04-22 PROCEDURE — 84590 ASSAY OF VITAMIN A: CPT

## 2024-04-22 PROCEDURE — 3078F DIAST BP <80 MM HG: CPT | Performed by: SURGERY

## 2024-04-22 PROCEDURE — 36415 COLL VENOUS BLD VENIPUNCTURE: CPT

## 2024-04-22 PROCEDURE — 80061 LIPID PANEL: CPT

## 2024-04-22 PROCEDURE — 3008F BODY MASS INDEX DOCD: CPT | Performed by: SURGERY

## 2024-04-22 PROCEDURE — 83880 ASSAY OF NATRIURETIC PEPTIDE: CPT

## 2024-04-22 PROCEDURE — 83970 ASSAY OF PARATHORMONE: CPT

## 2024-04-22 PROCEDURE — 3075F SYST BP GE 130 - 139MM HG: CPT | Performed by: SURGERY

## 2024-04-22 PROCEDURE — 82728 ASSAY OF FERRITIN: CPT

## 2024-04-22 PROCEDURE — 99205 OFFICE O/P NEW HI 60 MIN: CPT | Performed by: SURGERY

## 2024-04-22 PROCEDURE — 80053 COMPREHEN METABOLIC PANEL: CPT

## 2024-04-22 PROCEDURE — 82306 VITAMIN D 25 HYDROXY: CPT

## 2024-04-22 PROCEDURE — 84425 ASSAY OF VITAMIN B-1: CPT

## 2024-04-22 PROCEDURE — 84443 ASSAY THYROID STIM HORMONE: CPT

## 2024-04-22 PROCEDURE — 84630 ASSAY OF ZINC: CPT

## 2024-04-22 PROCEDURE — 85025 COMPLETE CBC W/AUTO DIFF WBC: CPT

## 2024-04-22 PROCEDURE — 83540 ASSAY OF IRON: CPT

## 2024-04-22 PROCEDURE — 83550 IRON BINDING TEST: CPT

## 2024-04-22 PROCEDURE — 82746 ASSAY OF FOLIC ACID SERUM: CPT

## 2024-04-23 LAB
BNP SERPL-MCNC: 55 PG/ML (ref 0–99)
PTH-INTACT SERPL-MCNC: 60.6 PG/ML (ref 18.5–88)

## 2024-04-24 LAB
COPPER SERPL-MCNC: 124.2 UG/DL (ref 80–155)
ZINC SERPL-MCNC: 83.5 UG/DL (ref 60–120)

## 2024-04-25 LAB
ANNOTATION COMMENT IMP: NORMAL
RETINYL PALMITATE SERPL-MCNC: <0.02 MG/L (ref 0–0.1)
VIT A SERPL-MCNC: 0.78 MG/L (ref 0.3–1.2)

## 2024-04-26 ENCOUNTER — TELEPHONE (OUTPATIENT)
Dept: SURGERY | Facility: CLINIC | Age: 64
End: 2024-04-26
Payer: COMMERCIAL

## 2024-04-26 NOTE — TELEPHONE ENCOUNTER
Saji Gatica, I see that the Esophagram ordered by Dr. Woodruff at your last visit is in our system.  Please call Radiology at:  [phone] to self-schedule, they will provide a list of  locations that you can select from to have completed at.  Thanks!  Jamaica Ayala, [facility] Nurse.

## 2024-04-28 LAB — VIT B1 PYROPHOSHATE BLD-SCNC: 134 NMOL/L (ref 70–180)

## 2024-04-29 NOTE — PROGRESS NOTES
"Follow Up Bariatric Nutrition Assessment                  Name: Gavi Tejeda  MRN: [id number]  Date: 04/30/24    Surgery Date:  11/17/2016  Surgeon:  Ranjan   Procedure:  sleeve gastrectomy    ASSESSMENT:    Current weight:      Vitals:    04/30/24 1152   Weight: 91.6 kg (202 lb)                 Ht: 1.652 m (5' 5.04\")  BMI:  Body mass index is 33.57 kg/m².  Previous weight:   202lbs (4/22/24)  Initial start weight:   268lbs  EBW:  132lbs      PROGRESS:    Nutrition Interventions for last encounter  (last BS RD on 12/5/2016)  1. Structure meal patterns, eating three meals and 1-2 snacks per day. Advance self to soft diet textures- refer to nutrition guideline booklet for appropriate food textures. Follow soft diet for the next 2 weeks, than advance self to regular texture.   2. Increase your protein intake- Have a good source of protein first at each meal & snack. Aim for 60-70 grams/day. Make protein be the main food you eat   3. Continue to drink 64oz of calorie-free, caffeine-free, and non-carbonated beverages- water, crystal light   4. Stop drinking 30 minutes before meals, nothing with meals and wait 30 minutes after meals to drink again. Make meals last 30 minutes-chew thoroughly.   5. Continue with daily multivitamin (2 [place] COMPLETE MVI-both at breakfast), INCREASE to 1200-1500mg of calcium per day (500-600mg at lunch, dinner AND before bed) and continue with 500mcg of vitamin B12 per day (at breakfast).   6. Increase physical activity by 10-15 minutes as tolerated to an end goal of 60 minutes 5 x per week. Consistency is the key.      CHANGES IN TREATMENT:   Patient met goals: partially     24 hour food recall:   Breakfast:  skip  Snack:   Lunch: salad w/turkey sometimes with egg or cheese   Snack:   pizza toppings and messed up pizzas  Dinner: smart ones, salmon corey with thin bread, sloppy joes   Snack: ice cream  Beverages: SF water flavor (96oz per day), coffee (2 pots) w/ SF creamer (2 tbsp " "per cup), diet coke   Alcohol: no -never       Vitamins: nothing   Medications:   Current Outpatient Medications:     atorvastatin (Lipitor) 40 mg tablet, Take 1 tablet (40 mg) by mouth once daily., Disp: 90 tablet, Rfl: 3    carvedilol (Coreg) 3.125 mg tablet, Take 1 tablet (3.125 mg) by mouth 2 times a day with meals., Disp: 60 tablet, Rfl: 11  Physical Activity: gym (treadmill or leg machine) 4-5x per week for 30-60 minutes     READINESS TO LEARN:  Motivation to learn: Interested      Understanding of instruction: Good     Anticipated Compliance: Good      Family Support: Unable to assess-family not present    4/22/24 w/Khaitan: \"Knows she eats the wrong things at time. Notes she overeats and vomits. Can eat junk. She makes bad choices.\" \"She notes she is boredom eating and making a lot of bad choices. She is also smoking. She worked at a pizza place and started grazing.\"    Uncomfortable full: sometimes and will throw up   30's rule: does not practice    Patient presents with post-op weight loss surgery sleeve gastrectomy. Pt is 8 years post op.  Patient seen today for weight gain post SG.  Tolerating regular diet without difficulty.  Protein intake is not adequate for post-op individual. Discussed adding breakfast daily plus high protein snacks as needed. Struggles with grazing behaviors and discussed assessing for true hunger when wanting snacks. Fluid consumption is adequate. However, drinks an excessive amount of caffeine. With plans to start weaning off. Patient is not supplementing recommended vitamin/minerals. Will follow up with the patient next month.     Malnutrition Screening  Significant unintentional weight loss? n/a  Eating less than 75% of usual intake for more than 2 weeks? n/a     Nutrition Diagnosis:   Increased protein and nutrition needs related to altered GI function as evidenced by pt. s/p sleeve gastrectomy.  Food- and nutrition-related knowledge deficit related to lack of prior exposure " to surgical weight loss information as evidenced by diet recall.     Nutrition Interventions:   Modify type and amount of food and nutrients within meals and snacks.  Comprehensive Nutrition Education  -Nutrition education materials:     Recommendations:    Eat 60-70 g of protein per day. Have a protein shake in the morning.   Have a balanced plate. 1/2 veggies, 1/4 protein and 1/4 starch.   Make the foods at work off limits. Bring in your own meals and snacks.   Have sugar free popsicle available for when you have a sweet tooth.  Identify grazing behaviors. Only snack when you truly feel hungry.   By next month, try to only have 1.5 pots of coffee per day. Eliminate coffee.   Begin no drinking 30 min before, during the meal and for 30 minutes after the meal.  Try to take 20-30 minutes to eat your meals. Stop when you feel satisfied. You do not need to finish your plate.  Make your exercise hard. You goal is to get your heart rate up. Consider adding time to your walks or add an incline. As you feel comfortable, add more resistance exercises like the weight machine.   Take a recommended multivitamin 2 times a day (It needs to have Iron (15 mg), Zinc (10 mg), Copper (1 mg), Thiamin (Vitamin B1) (1.5 mg), and Folic Acid (400 mcg)). You can take CentrumAdult. Take calcium CITRATE 2-3x a day in 500-600mg doses. You want to get 1200-1500mg a day. Be sure to take calcium separately from your multivitamin. You need 500mcg of vitamin B12.       Nutrition Monitoring and Evaluation:   1-2 pounds weight loss per week  Criteria: weight check, food recall  Need for Follow-up: one month     Natasha Henning MS, RD, LD  Phone: [phone]

## 2024-04-30 ENCOUNTER — APPOINTMENT (OUTPATIENT)
Dept: RADIOLOGY | Facility: HOSPITAL | Age: 64
End: 2024-04-30
Payer: COMMERCIAL

## 2024-04-30 ENCOUNTER — NUTRITION (OUTPATIENT)
Dept: SURGERY | Facility: CLINIC | Age: 64
End: 2024-04-30
Payer: COMMERCIAL

## 2024-04-30 ENCOUNTER — HOSPITAL ENCOUNTER (OUTPATIENT)
Facility: HOSPITAL | Age: 64
Setting detail: OBSERVATION
Discharge: HOME | End: 2024-05-02
Attending: EMERGENCY MEDICINE | Admitting: SURGERY
Payer: COMMERCIAL

## 2024-04-30 VITALS — BODY MASS INDEX: 33.66 KG/M2 | WEIGHT: 202 LBS | HEIGHT: 65 IN

## 2024-04-30 DIAGNOSIS — S02.85XB: ICD-10-CM
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
## 2024-04-30 LAB
ALBUMIN SERPL BCP-MCNC: 4.1 G/DL (ref 3.4–5)
ALP SERPL-CCNC: 91 U/L (ref 33–136)
ALT SERPL W P-5'-P-CCNC: 19 U/L (ref 7–45)
ANION GAP BLDV CALCULATED.4IONS-SCNC: 12 MMOL/L (ref 10–25)
ANION GAP SERPL CALC-SCNC: 14 MMOL/L (ref 10–20)
AST SERPL W P-5'-P-CCNC: 28 U/L (ref 9–39)
BASE EXCESS BLDV CALC-SCNC: -1.3 MMOL/L (ref -2–3)
BASOPHILS # BLD AUTO: 0.04 X10*3/UL (ref 0–0.1)
BASOPHILS NFR BLD AUTO: 0.7 %
BILIRUB SERPL-MCNC: 0.3 MG/DL (ref 0–1.2)
BODY TEMPERATURE: 37 DEGREES CELSIUS
BUN SERPL-MCNC: 21 MG/DL (ref 6–23)
CA-I BLDV-SCNC: 1.14 MMOL/L (ref 1.1–1.33)
CALCIUM SERPL-MCNC: 9.2 MG/DL (ref 8.6–10.6)
CHLORIDE BLDV-SCNC: 107 MMOL/L (ref 98–107)
CHLORIDE SERPL-SCNC: 108 MMOL/L (ref 98–107)
CO2 SERPL-SCNC: 23 MMOL/L (ref 21–32)
CREAT SERPL-MCNC: 0.84 MG/DL (ref 0.5–1.05)
EGFRCR SERPLBLD CKD-EPI 2021: 78 ML/MIN/1.73M*2
EOSINOPHIL # BLD AUTO: 0.09 X10*3/UL (ref 0–0.7)
EOSINOPHIL NFR BLD AUTO: 1.5 %
ERYTHROCYTE [DISTWIDTH] IN BLOOD BY AUTOMATED COUNT: 12.4 % (ref 11.5–14.5)
ETHANOL SERPL-MCNC: <10 MG/DL
GLUCOSE BLDV-MCNC: 127 MG/DL (ref 74–99)
GLUCOSE SERPL-MCNC: 120 MG/DL (ref 74–99)
HCO3 BLDV-SCNC: 23.7 MMOL/L (ref 22–26)
HCT VFR BLD AUTO: 40 % (ref 36–46)
HCT VFR BLD EST: 41 % (ref 36–46)
HGB BLD-MCNC: 13.8 G/DL (ref 12–16)
HGB BLDV-MCNC: 13.7 G/DL (ref 12–16)
IMM GRANULOCYTES # BLD AUTO: 0.01 X10*3/UL (ref 0–0.7)
IMM GRANULOCYTES NFR BLD AUTO: 0.2 % (ref 0–0.9)
INR PPP: 1 (ref 0.9–1.1)
LACTATE BLDV-SCNC: 1.9 MMOL/L (ref 0.4–2)
LACTATE SERPL-SCNC: 2.1 MMOL/L (ref 0.4–2)
LYMPHOCYTES # BLD AUTO: 1.46 X10*3/UL (ref 1.2–4.8)
LYMPHOCYTES NFR BLD AUTO: 25 %
MAGNESIUM SERPL-MCNC: 1.88 MG/DL (ref 1.6–2.4)
MCH RBC QN AUTO: 31.7 PG (ref 26–34)
MCHC RBC AUTO-ENTMCNC: 34.5 G/DL (ref 32–36)
MCV RBC AUTO: 92 FL (ref 80–100)
MONOCYTES # BLD AUTO: 0.4 X10*3/UL (ref 0.1–1)
MONOCYTES NFR BLD AUTO: 6.8 %
NEUTROPHILS # BLD AUTO: 3.85 X10*3/UL (ref 1.2–7.7)
NEUTROPHILS NFR BLD AUTO: 65.8 %
NRBC BLD-RTO: 0 /100 WBCS (ref 0–0)
OXYHGB MFR BLDV: 67.9 % (ref 45–75)
PCO2 BLDV: 40 MM HG (ref 41–51)
PH BLDV: 7.38 PH (ref 7.33–7.43)
PHOSPHATE SERPL-MCNC: 3.1 MG/DL (ref 2.5–4.9)
PLATELET # BLD AUTO: 205 X10*3/UL (ref 150–450)
PO2 BLDV: 41 MM HG (ref 35–45)
POTASSIUM BLDV-SCNC: 3.9 MMOL/L (ref 3.5–5.3)
POTASSIUM SERPL-SCNC: 3.9 MMOL/L (ref 3.5–5.3)
PROT SERPL-MCNC: 6.5 G/DL (ref 6.4–8.2)
PROTHROMBIN TIME: 11 SECONDS (ref 9.8–12.8)
RBC # BLD AUTO: 4.36 X10*6/UL (ref 4–5.2)
SAO2 % BLDV: 70 % (ref 45–75)
SODIUM BLDV-SCNC: 139 MMOL/L (ref 136–145)
SODIUM SERPL-SCNC: 141 MMOL/L (ref 136–145)
WBC # BLD AUTO: 5.9 X10*3/UL (ref 4.4–11.3)

## 2024-04-30 PROCEDURE — 82435 ASSAY OF BLOOD CHLORIDE: CPT

## 2024-04-30 PROCEDURE — 99285 EMERGENCY DEPT VISIT HI MDM: CPT | Mod: 25

## 2024-04-30 PROCEDURE — 83605 ASSAY OF LACTIC ACID: CPT | Performed by: NURSE PRACTITIONER

## 2024-04-30 PROCEDURE — 71045 X-RAY EXAM CHEST 1 VIEW: CPT

## 2024-04-30 PROCEDURE — 82077 ASSAY SPEC XCP UR&BREATH IA: CPT | Performed by: NURSE PRACTITIONER

## 2024-04-30 PROCEDURE — 12052 INTMD RPR FACE/MM 2.6-5.0 CM: CPT

## 2024-04-30 PROCEDURE — 70450 CT HEAD/BRAIN W/O DYE: CPT

## 2024-04-30 PROCEDURE — 36415 COLL VENOUS BLD VENIPUNCTURE: CPT | Performed by: NURSE PRACTITIONER

## 2024-04-30 PROCEDURE — 72170 X-RAY EXAM OF PELVIS: CPT

## 2024-04-30 PROCEDURE — 99223 1ST HOSP IP/OBS HIGH 75: CPT | Performed by: PHYSICIAN ASSISTANT

## 2024-04-30 PROCEDURE — 76377 3D RENDER W/INTRP POSTPROCES: CPT

## 2024-04-30 PROCEDURE — 84100 ASSAY OF PHOSPHORUS: CPT | Performed by: NURSE PRACTITIONER

## 2024-04-30 PROCEDURE — 2550000001 HC RX 255 CONTRASTS: Mod: SE | Performed by: EMERGENCY MEDICINE

## 2024-04-30 PROCEDURE — 74177 CT ABD & PELVIS W/CONTRAST: CPT

## 2024-04-30 PROCEDURE — 83735 ASSAY OF MAGNESIUM: CPT | Performed by: NURSE PRACTITIONER

## 2024-04-30 PROCEDURE — G0390 TRAUMA RESPONS W/HOSP CRITI: HCPCS

## 2024-04-30 PROCEDURE — 85025 COMPLETE CBC W/AUTO DIFF WBC: CPT | Performed by: NURSE PRACTITIONER

## 2024-04-30 PROCEDURE — 72125 CT NECK SPINE W/O DYE: CPT

## 2024-04-30 PROCEDURE — 70486 CT MAXILLOFACIAL W/O DYE: CPT

## 2024-04-30 PROCEDURE — 72131 CT LUMBAR SPINE W/O DYE: CPT | Mod: RCN

## 2024-04-30 PROCEDURE — 84132 ASSAY OF SERUM POTASSIUM: CPT

## 2024-04-30 PROCEDURE — 84075 ASSAY ALKALINE PHOSPHATASE: CPT | Performed by: NURSE PRACTITIONER

## 2024-04-30 PROCEDURE — 72128 CT CHEST SPINE W/O DYE: CPT | Mod: RCN

## 2024-04-30 PROCEDURE — 86901 BLOOD TYPING SEROLOGIC RH(D): CPT | Performed by: NURSE PRACTITIONER

## 2024-04-30 PROCEDURE — 85610 PROTHROMBIN TIME: CPT | Performed by: NURSE PRACTITIONER

## 2024-04-30 RX ORDER — LIDOCAINE HYDROCHLORIDE AND EPINEPHRINE 10; 10 MG/ML; UG/ML
20 INJECTION, SOLUTION INFILTRATION; PERINEURAL ONCE
Status: COMPLETED | OUTPATIENT
Start: 2024-04-30 | End: 2024-05-01

## 2024-04-30 RX ORDER — HYDROMORPHONE HYDROCHLORIDE 1 MG/ML
0.5 INJECTION, SOLUTION INTRAMUSCULAR; INTRAVENOUS; SUBCUTANEOUS ONCE
Status: COMPLETED | OUTPATIENT
Start: 2024-04-30 | End: 2024-05-01

## 2024-04-30 RX ADMIN — IOHEXOL 100 ML: 350 INJECTION, SOLUTION INTRAVENOUS at 23:06

## 2024-04-30 ASSESSMENT — LIFESTYLE VARIABLES
EVER HAD A DRINK FIRST THING IN THE MORNING TO STEADY YOUR NERVES TO GET RID OF A HANGOVER: NO
TOTAL SCORE: 0
HAVE PEOPLE ANNOYED YOU BY CRITICIZING YOUR DRINKING: NO
EVER FELT BAD OR GUILTY ABOUT YOUR DRINKING: NO
HAVE YOU EVER FELT YOU SHOULD CUT DOWN ON YOUR DRINKING: NO

## 2024-04-30 ASSESSMENT — PAIN DESCRIPTION - LOCATION: LOCATION: HEAD

## 2024-04-30 ASSESSMENT — COLUMBIA-SUICIDE SEVERITY RATING SCALE - C-SSRS
2. HAVE YOU ACTUALLY HAD ANY THOUGHTS OF KILLING YOURSELF?: NO
1. IN THE PAST MONTH, HAVE YOU WISHED YOU WERE DEAD OR WISHED YOU COULD GO TO SLEEP AND NOT WAKE UP?: NO
6. HAVE YOU EVER DONE ANYTHING, STARTED TO DO ANYTHING, OR PREPARED TO DO ANYTHING TO END YOUR LIFE?: NO

## 2024-04-30 ASSESSMENT — PAIN SCALES - GENERAL: PAINLEVEL_OUTOF10: 7

## 2024-04-30 ASSESSMENT — PAIN - FUNCTIONAL ASSESSMENT: PAIN_FUNCTIONAL_ASSESSMENT: 0-10

## 2024-04-30 ASSESSMENT — PAIN DESCRIPTION - PAIN TYPE: TYPE: ACUTE PAIN

## 2024-05-01 ENCOUNTER — APPOINTMENT (OUTPATIENT)
Dept: RADIOLOGY | Facility: HOSPITAL | Age: 64
End: 2024-05-01
Payer: COMMERCIAL

## 2024-05-01 ENCOUNTER — ANESTHESIA (OUTPATIENT)
Dept: OPERATING ROOM | Facility: HOSPITAL | Age: 64
End: 2024-05-01
Payer: COMMERCIAL

## 2024-05-01 ENCOUNTER — PREP FOR PROCEDURE (OUTPATIENT)
Dept: SURGERY | Facility: HOSPITAL | Age: 64
End: 2024-05-01
Payer: COMMERCIAL

## 2024-05-01 ENCOUNTER — ANESTHESIA EVENT (OUTPATIENT)
Dept: OPERATING ROOM | Facility: HOSPITAL | Age: 64
End: 2024-05-01
Payer: COMMERCIAL

## 2024-05-01 DIAGNOSIS — S02.85XB: Primary | ICD-10-CM
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
## 2024-05-01 LAB
ABO GROUP (TYPE) IN BLOOD: NORMAL
ANTIBODY SCREEN: NORMAL
RH FACTOR (ANTIGEN D): NORMAL

## 2024-05-01 PROCEDURE — G0378 HOSPITAL OBSERVATION PER HR: HCPCS

## 2024-05-01 PROCEDURE — 96365 THER/PROPH/DIAG IV INF INIT: CPT | Mod: 59 | Performed by: SURGERY

## 2024-05-01 PROCEDURE — 7100000002 HC RECOVERY ROOM TIME - EACH INCREMENTAL 1 MINUTE: Performed by: DENTIST

## 2024-05-01 PROCEDURE — 2500000001 HC RX 250 WO HCPCS SELF ADMINISTERED DRUGS (ALT 637 FOR MEDICARE OP): Mod: SE

## 2024-05-01 PROCEDURE — 3600000003 HC OR TIME - INITIAL BASE CHARGE - PROCEDURE LEVEL THREE: Performed by: DENTIST

## 2024-05-01 PROCEDURE — 96376 TX/PRO/DX INJ SAME DRUG ADON: CPT | Mod: 59 | Performed by: SURGERY

## 2024-05-01 PROCEDURE — 2500000001 HC RX 250 WO HCPCS SELF ADMINISTERED DRUGS (ALT 637 FOR MEDICARE OP): Performed by: PHYSICIAN ASSISTANT

## 2024-05-01 PROCEDURE — 96375 TX/PRO/DX INJ NEW DRUG ADDON: CPT | Mod: 59 | Performed by: SURGERY

## 2024-05-01 PROCEDURE — C1713 ANCHOR/SCREW BN/BN,TIS/BN: HCPCS | Performed by: DENTIST

## 2024-05-01 PROCEDURE — 7100000001 HC RECOVERY ROOM TIME - INITIAL BASE CHARGE: Performed by: DENTIST

## 2024-05-01 PROCEDURE — 2500000004 HC RX 250 GENERAL PHARMACY W/ HCPCS (ALT 636 FOR OP/ED): Performed by: DENTIST

## 2024-05-01 PROCEDURE — 73130 X-RAY EXAM OF HAND: CPT | Mod: LT

## 2024-05-01 PROCEDURE — 2500000004 HC RX 250 GENERAL PHARMACY W/ HCPCS (ALT 636 FOR OP/ED): Mod: SE | Performed by: STUDENT IN AN ORGANIZED HEALTH CARE EDUCATION/TRAINING PROGRAM

## 2024-05-01 PROCEDURE — 2500000004 HC RX 250 GENERAL PHARMACY W/ HCPCS (ALT 636 FOR OP/ED): Mod: SE | Performed by: PHYSICIAN ASSISTANT

## 2024-05-01 PROCEDURE — 2500000005 HC RX 250 GENERAL PHARMACY W/O HCPCS: Mod: SE | Performed by: PHYSICIAN ASSISTANT

## 2024-05-01 PROCEDURE — 2500000005 HC RX 250 GENERAL PHARMACY W/O HCPCS: Performed by: ANESTHESIOLOGIST ASSISTANT

## 2024-05-01 PROCEDURE — 2720000007 HC OR 272 NO HCPCS: Performed by: DENTIST

## 2024-05-01 PROCEDURE — 73564 X-RAY EXAM KNEE 4 OR MORE: CPT | Mod: 50

## 2024-05-01 PROCEDURE — 99231 SBSQ HOSP IP/OBS SF/LOW 25: CPT | Performed by: SURGERY

## 2024-05-01 PROCEDURE — 3600000008 HC OR TIME - EACH INCREMENTAL 1 MINUTE - PROCEDURE LEVEL THREE: Performed by: DENTIST

## 2024-05-01 PROCEDURE — 2780000003 HC OR 278 NO HCPCS: Performed by: DENTIST

## 2024-05-01 PROCEDURE — 2500000004 HC RX 250 GENERAL PHARMACY W/ HCPCS (ALT 636 FOR OP/ED): Performed by: ANESTHESIOLOGIST ASSISTANT

## 2024-05-01 PROCEDURE — 73110 X-RAY EXAM OF WRIST: CPT | Mod: LT

## 2024-05-01 PROCEDURE — A4217 STERILE WATER/SALINE, 500 ML: HCPCS | Performed by: DENTIST

## 2024-05-01 PROCEDURE — 2500000004 HC RX 250 GENERAL PHARMACY W/ HCPCS (ALT 636 FOR OP/ED): Performed by: PHYSICIAN ASSISTANT

## 2024-05-01 PROCEDURE — 3700000002 HC GENERAL ANESTHESIA TIME - EACH INCREMENTAL 1 MINUTE: Performed by: DENTIST

## 2024-05-01 PROCEDURE — 2500000004 HC RX 250 GENERAL PHARMACY W/ HCPCS (ALT 636 FOR OP/ED): Performed by: ANESTHESIOLOGY

## 2024-05-01 PROCEDURE — A21406: Performed by: ANESTHESIOLOGY

## 2024-05-01 PROCEDURE — 96361 HYDRATE IV INFUSION ADD-ON: CPT | Mod: 59 | Performed by: SURGERY

## 2024-05-01 PROCEDURE — A21406: Performed by: ANESTHESIOLOGIST ASSISTANT

## 2024-05-01 PROCEDURE — 2500000005 HC RX 250 GENERAL PHARMACY W/O HCPCS: Performed by: DENTIST

## 2024-05-01 PROCEDURE — 3700000001 HC GENERAL ANESTHESIA TIME - INITIAL BASE CHARGE: Performed by: DENTIST

## 2024-05-01 DEVICE — IMPLANTABLE DEVICE: Type: IMPLANTABLE DEVICE | Site: FACE | Status: FUNCTIONAL

## 2024-05-01 RX ORDER — OXYCODONE HYDROCHLORIDE 5 MG/1
5 TABLET ORAL EVERY 4 HOURS PRN
Status: DISCONTINUED | OUTPATIENT
Start: 2024-05-01 | End: 2024-05-01 | Stop reason: HOSPADM

## 2024-05-01 RX ORDER — DIPHENHYDRAMINE HYDROCHLORIDE 50 MG/ML
25 INJECTION INTRAMUSCULAR; INTRAVENOUS ONCE AS NEEDED
Status: DISCONTINUED | OUTPATIENT
Start: 2024-05-01 | End: 2024-05-01 | Stop reason: HOSPADM

## 2024-05-01 RX ORDER — LABETALOL HYDROCHLORIDE 5 MG/ML
5 INJECTION, SOLUTION INTRAVENOUS ONCE AS NEEDED
Status: DISCONTINUED | OUTPATIENT
Start: 2024-05-01 | End: 2024-05-01 | Stop reason: HOSPADM

## 2024-05-01 RX ORDER — SODIUM CHLORIDE, SODIUM LACTATE, POTASSIUM CHLORIDE, CALCIUM CHLORIDE 600; 310; 30; 20 MG/100ML; MG/100ML; MG/100ML; MG/100ML
100 INJECTION, SOLUTION INTRAVENOUS CONTINUOUS
Status: DISCONTINUED | OUTPATIENT
Start: 2024-05-01 | End: 2024-05-02 | Stop reason: HOSPADM

## 2024-05-01 RX ORDER — LIDOCAINE HYDROCHLORIDE 10 MG/ML
0.1 INJECTION INFILTRATION; PERINEURAL ONCE
Status: DISCONTINUED | OUTPATIENT
Start: 2024-05-01 | End: 2024-05-01 | Stop reason: HOSPADM

## 2024-05-01 RX ORDER — CLINDAMYCIN PHOSPHATE 600 MG/50ML
600 INJECTION, SOLUTION INTRAVENOUS EVERY 8 HOURS
Status: DISCONTINUED | OUTPATIENT
Start: 2024-05-01 | End: 2024-05-02 | Stop reason: HOSPADM

## 2024-05-01 RX ORDER — OXYCODONE HYDROCHLORIDE 5 MG/1
10 TABLET ORAL EVERY 4 HOURS PRN
Status: DISCONTINUED | OUTPATIENT
Start: 2024-05-01 | End: 2024-05-02 | Stop reason: HOSPADM

## 2024-05-01 RX ORDER — PHENYLEPHRINE HCL IN 0.9% NACL 0.4MG/10ML
SYRINGE (ML) INTRAVENOUS AS NEEDED
Status: DISCONTINUED | OUTPATIENT
Start: 2024-05-01 | End: 2024-05-01

## 2024-05-01 RX ORDER — ATORVASTATIN CALCIUM 40 MG/1
40 TABLET, FILM COATED ORAL DAILY
Status: DISCONTINUED | OUTPATIENT
Start: 2024-05-01 | End: 2024-05-02 | Stop reason: HOSPADM

## 2024-05-01 RX ORDER — HYDROMORPHONE HYDROCHLORIDE 1 MG/ML
0.2 INJECTION, SOLUTION INTRAMUSCULAR; INTRAVENOUS; SUBCUTANEOUS EVERY 5 MIN PRN
Status: DISCONTINUED | OUTPATIENT
Start: 2024-05-01 | End: 2024-05-01 | Stop reason: HOSPADM

## 2024-05-01 RX ORDER — ONDANSETRON HYDROCHLORIDE 2 MG/ML
4 INJECTION, SOLUTION INTRAVENOUS ONCE AS NEEDED
Status: COMPLETED | OUTPATIENT
Start: 2024-05-01 | End: 2024-05-01

## 2024-05-01 RX ORDER — ONDANSETRON HYDROCHLORIDE 2 MG/ML
4 INJECTION, SOLUTION INTRAVENOUS ONCE
Status: COMPLETED | OUTPATIENT
Start: 2024-05-01 | End: 2024-05-01

## 2024-05-01 RX ORDER — NALOXONE HYDROCHLORIDE 0.4 MG/ML
0.2 INJECTION, SOLUTION INTRAMUSCULAR; INTRAVENOUS; SUBCUTANEOUS EVERY 5 MIN PRN
Status: DISCONTINUED | OUTPATIENT
Start: 2024-05-01 | End: 2024-05-02 | Stop reason: HOSPADM

## 2024-05-01 RX ORDER — HYDROMORPHONE HYDROCHLORIDE 1 MG/ML
0.5 INJECTION, SOLUTION INTRAMUSCULAR; INTRAVENOUS; SUBCUTANEOUS ONCE
Status: COMPLETED | OUTPATIENT
Start: 2024-05-01 | End: 2024-05-01

## 2024-05-01 RX ORDER — BACITRACIN 500 [USP'U]/G
OINTMENT TOPICAL AS NEEDED
Status: DISCONTINUED | OUTPATIENT
Start: 2024-05-01 | End: 2024-05-01 | Stop reason: HOSPADM

## 2024-05-01 RX ORDER — DEXMEDETOMIDINE HYDROCHLORIDE 4 UG/ML
INJECTION, SOLUTION INTRAVENOUS CONTINUOUS PRN
Status: DISCONTINUED | OUTPATIENT
Start: 2024-05-01 | End: 2024-05-01

## 2024-05-01 RX ORDER — AMOXICILLIN 250 MG
2 CAPSULE ORAL 2 TIMES DAILY
Status: DISCONTINUED | OUTPATIENT
Start: 2024-05-01 | End: 2024-05-02 | Stop reason: HOSPADM

## 2024-05-01 RX ORDER — CEFAZOLIN 1 G/1
INJECTION, POWDER, FOR SOLUTION INTRAVENOUS AS NEEDED
Status: DISCONTINUED | OUTPATIENT
Start: 2024-05-01 | End: 2024-05-01

## 2024-05-01 RX ORDER — CARVEDILOL 3.12 MG/1
3.12 TABLET ORAL
COMMUNITY
End: 2024-05-06 | Stop reason: SDUPTHER

## 2024-05-01 RX ORDER — HYDROMORPHONE HYDROCHLORIDE 1 MG/ML
0.5 INJECTION, SOLUTION INTRAMUSCULAR; INTRAVENOUS; SUBCUTANEOUS EVERY 5 MIN PRN
Status: DISCONTINUED | OUTPATIENT
Start: 2024-05-01 | End: 2024-05-01 | Stop reason: HOSPADM

## 2024-05-01 RX ORDER — HYDROMORPHONE HYDROCHLORIDE 1 MG/ML
0.4 INJECTION, SOLUTION INTRAMUSCULAR; INTRAVENOUS; SUBCUTANEOUS EVERY 2 HOUR PRN
Status: DISCONTINUED | OUTPATIENT
Start: 2024-05-01 | End: 2024-05-02

## 2024-05-01 RX ORDER — ACETAMINOPHEN 325 MG/1
975 TABLET ORAL EVERY 6 HOURS SCHEDULED
Status: DISCONTINUED | OUTPATIENT
Start: 2024-05-01 | End: 2024-05-01

## 2024-05-01 RX ORDER — FENTANYL CITRATE 50 UG/ML
INJECTION, SOLUTION INTRAMUSCULAR; INTRAVENOUS AS NEEDED
Status: DISCONTINUED | OUTPATIENT
Start: 2024-05-01 | End: 2024-05-01

## 2024-05-01 RX ORDER — SODIUM CHLORIDE 0.9 G/100ML
IRRIGANT IRRIGATION AS NEEDED
Status: DISCONTINUED | OUTPATIENT
Start: 2024-05-01 | End: 2024-05-01 | Stop reason: HOSPADM

## 2024-05-01 RX ORDER — ROCURONIUM BROMIDE 10 MG/ML
INJECTION, SOLUTION INTRAVENOUS AS NEEDED
Status: DISCONTINUED | OUTPATIENT
Start: 2024-05-01 | End: 2024-05-01

## 2024-05-01 RX ORDER — OXYCODONE HYDROCHLORIDE 5 MG/1
5 TABLET ORAL EVERY 6 HOURS PRN
Status: DISCONTINUED | OUTPATIENT
Start: 2024-05-01 | End: 2024-05-02

## 2024-05-01 RX ORDER — NALOXONE HYDROCHLORIDE 0.4 MG/ML
0.2 INJECTION, SOLUTION INTRAMUSCULAR; INTRAVENOUS; SUBCUTANEOUS EVERY 5 MIN PRN
Status: DISCONTINUED | OUTPATIENT
Start: 2024-05-01 | End: 2024-05-01 | Stop reason: SDUPTHER

## 2024-05-01 RX ORDER — PROPOFOL 10 MG/ML
INJECTION, EMULSION INTRAVENOUS AS NEEDED
Status: DISCONTINUED | OUTPATIENT
Start: 2024-05-01 | End: 2024-05-01

## 2024-05-01 RX ORDER — ACETAMINOPHEN 325 MG/1
975 TABLET ORAL EVERY 8 HOURS
Status: DISCONTINUED | OUTPATIENT
Start: 2024-05-01 | End: 2024-05-02 | Stop reason: HOSPADM

## 2024-05-01 RX ORDER — ALBUTEROL SULFATE 0.83 MG/ML
2.5 SOLUTION RESPIRATORY (INHALATION) ONCE AS NEEDED
Status: DISCONTINUED | OUTPATIENT
Start: 2024-05-01 | End: 2024-05-01 | Stop reason: HOSPADM

## 2024-05-01 RX ORDER — HYDROMORPHONE HYDROCHLORIDE 1 MG/ML
0.5 INJECTION, SOLUTION INTRAMUSCULAR; INTRAVENOUS; SUBCUTANEOUS EVERY 2 HOUR PRN
Status: DISCONTINUED | OUTPATIENT
Start: 2024-05-01 | End: 2024-05-01

## 2024-05-01 RX ORDER — LIDOCAINE HYDROCHLORIDE AND EPINEPHRINE 10; 10 MG/ML; UG/ML
INJECTION, SOLUTION INFILTRATION; PERINEURAL AS NEEDED
Status: DISCONTINUED | OUTPATIENT
Start: 2024-05-01 | End: 2024-05-01 | Stop reason: HOSPADM

## 2024-05-01 RX ORDER — ATORVASTATIN CALCIUM 40 MG/1
40 TABLET, FILM COATED ORAL DAILY
COMMUNITY
End: 2024-05-06 | Stop reason: ALTCHOICE

## 2024-05-01 RX ORDER — CARVEDILOL 6.25 MG/1
3.12 TABLET ORAL
Status: DISCONTINUED | OUTPATIENT
Start: 2024-05-01 | End: 2024-05-02 | Stop reason: HOSPADM

## 2024-05-01 RX ORDER — LIDOCAINE HCL/PF 100 MG/5ML
SYRINGE (ML) INTRAVENOUS AS NEEDED
Status: DISCONTINUED | OUTPATIENT
Start: 2024-05-01 | End: 2024-05-01

## 2024-05-01 RX ORDER — MIDAZOLAM HYDROCHLORIDE 1 MG/ML
INJECTION INTRAMUSCULAR; INTRAVENOUS AS NEEDED
Status: DISCONTINUED | OUTPATIENT
Start: 2024-05-01 | End: 2024-05-01

## 2024-05-01 RX ORDER — SODIUM CHLORIDE, SODIUM LACTATE, POTASSIUM CHLORIDE, CALCIUM CHLORIDE 600; 310; 30; 20 MG/100ML; MG/100ML; MG/100ML; MG/100ML
100 INJECTION, SOLUTION INTRAVENOUS CONTINUOUS
Status: DISCONTINUED | OUTPATIENT
Start: 2024-05-01 | End: 2024-05-01

## 2024-05-01 RX ORDER — ONDANSETRON HYDROCHLORIDE 2 MG/ML
INJECTION, SOLUTION INTRAVENOUS AS NEEDED
Status: DISCONTINUED | OUTPATIENT
Start: 2024-05-01 | End: 2024-05-01

## 2024-05-01 RX ADMIN — MIDAZOLAM HYDROCHLORIDE 1 MG: 1 INJECTION, SOLUTION INTRAMUSCULAR; INTRAVENOUS at 16:13

## 2024-05-01 RX ADMIN — PROPOFOL 150 MG: 10 INJECTION, EMULSION INTRAVENOUS at 16:27

## 2024-05-01 RX ADMIN — FENTANYL CITRATE 50 MCG: 50 INJECTION, SOLUTION INTRAMUSCULAR; INTRAVENOUS at 16:27

## 2024-05-01 RX ADMIN — SENNOSIDES AND DOCUSATE SODIUM 2 TABLET: 8.6; 5 TABLET ORAL at 21:50

## 2024-05-01 RX ADMIN — DEXAMETHASONE SODIUM PHOSPHATE 10 MG: 4 INJECTION INTRA-ARTICULAR; INTRALESIONAL; INTRAMUSCULAR; INTRAVENOUS; SOFT TISSUE at 16:45

## 2024-05-01 RX ADMIN — DEXMEDETOMIDINE HYDROCHLORIDE 0.4 MCG/KG/HR: 4 INJECTION, SOLUTION INTRAVENOUS at 16:46

## 2024-05-01 RX ADMIN — SODIUM CHLORIDE, POTASSIUM CHLORIDE, SODIUM LACTATE AND CALCIUM CHLORIDE: 600; 310; 30; 20 INJECTION, SOLUTION INTRAVENOUS at 16:09

## 2024-05-01 RX ADMIN — ATORVASTATIN CALCIUM 40 MG: 40 TABLET, FILM COATED ORAL at 11:44

## 2024-05-01 RX ADMIN — CLINDAMYCIN PHOSPHATE 600 MG: 600 INJECTION, SOLUTION INTRAVENOUS at 04:55

## 2024-05-01 RX ADMIN — FENTANYL CITRATE 25 MCG: 50 INJECTION, SOLUTION INTRAMUSCULAR; INTRAVENOUS at 16:46

## 2024-05-01 RX ADMIN — LIDOCAINE HYDROCHLORIDE 100 MG: 20 INJECTION INTRAVENOUS at 16:27

## 2024-05-01 RX ADMIN — OXYCODONE HYDROCHLORIDE 10 MG: 5 TABLET ORAL at 20:29

## 2024-05-01 RX ADMIN — SODIUM CHLORIDE, POTASSIUM CHLORIDE, SODIUM LACTATE AND CALCIUM CHLORIDE 100 ML/HR: 600; 310; 30; 20 INJECTION, SOLUTION INTRAVENOUS at 05:55

## 2024-05-01 RX ADMIN — FENTANYL CITRATE 25 MCG: 50 INJECTION, SOLUTION INTRAMUSCULAR; INTRAVENOUS at 16:58

## 2024-05-01 RX ADMIN — SODIUM CHLORIDE, POTASSIUM CHLORIDE, SODIUM LACTATE AND CALCIUM CHLORIDE 100 ML/HR: 600; 310; 30; 20 INJECTION, SOLUTION INTRAVENOUS at 18:30

## 2024-05-01 RX ADMIN — CEFAZOLIN 2 G: 1 INJECTION, POWDER, FOR SOLUTION INTRAMUSCULAR; INTRAVENOUS at 16:39

## 2024-05-01 RX ADMIN — SUGAMMADEX 200 MG: 100 INJECTION, SOLUTION INTRAVENOUS at 17:57

## 2024-05-01 RX ADMIN — ROCURONIUM BROMIDE 50 MG: 10 INJECTION INTRAVENOUS at 16:28

## 2024-05-01 RX ADMIN — CLINDAMYCIN PHOSPHATE 600 MG: 600 INJECTION, SOLUTION INTRAVENOUS at 11:44

## 2024-05-01 RX ADMIN — SODIUM CHLORIDE, SODIUM LACTATE, POTASSIUM CHLORIDE, AND CALCIUM CHLORIDE: 600; 310; 30; 20 INJECTION, SOLUTION INTRAVENOUS at 16:33

## 2024-05-01 RX ADMIN — ACETAMINOPHEN 975 MG: 325 TABLET ORAL at 05:54

## 2024-05-01 RX ADMIN — MIDAZOLAM HYDROCHLORIDE 1 MG: 1 INJECTION, SOLUTION INTRAMUSCULAR; INTRAVENOUS at 16:17

## 2024-05-01 RX ADMIN — LIDOCAINE HYDROCHLORIDE,EPINEPHRINE BITARTRATE 20 ML: 10; .01 INJECTION, SOLUTION INFILTRATION; PERINEURAL at 00:24

## 2024-05-01 RX ADMIN — SODIUM CHLORIDE, POTASSIUM CHLORIDE, SODIUM LACTATE AND CALCIUM CHLORIDE 100 ML/HR: 600; 310; 30; 20 INJECTION, SOLUTION INTRAVENOUS at 20:26

## 2024-05-01 RX ADMIN — ACETAMINOPHEN 975 MG: 325 TABLET ORAL at 21:49

## 2024-05-01 RX ADMIN — ONDANSETRON 4 MG: 2 INJECTION INTRAMUSCULAR; INTRAVENOUS at 18:20

## 2024-05-01 RX ADMIN — Medication 80 MCG: at 17:46

## 2024-05-01 RX ADMIN — HYDROMORPHONE HYDROCHLORIDE 0.5 MG: 1 INJECTION, SOLUTION INTRAMUSCULAR; INTRAVENOUS; SUBCUTANEOUS at 00:16

## 2024-05-01 RX ADMIN — HYDROMORPHONE HYDROCHLORIDE 0.5 MG: 1 INJECTION, SOLUTION INTRAMUSCULAR; INTRAVENOUS; SUBCUTANEOUS at 03:07

## 2024-05-01 RX ADMIN — ONDANSETRON 4 MG: 2 INJECTION INTRAMUSCULAR; INTRAVENOUS at 02:00

## 2024-05-01 RX ADMIN — ONDANSETRON 4 MG: 2 INJECTION INTRAMUSCULAR; INTRAVENOUS at 17:24

## 2024-05-01 SDOH — SOCIAL STABILITY: SOCIAL INSECURITY: HAVE YOU HAD ANY THOUGHTS OF HARMING ANYONE ELSE?: NO

## 2024-05-01 SDOH — SOCIAL STABILITY: SOCIAL INSECURITY: ABUSE: ADULT

## 2024-05-01 SDOH — SOCIAL STABILITY: SOCIAL INSECURITY: ARE YOU OR HAVE YOU BEEN THREATENED OR ABUSED PHYSICALLY, EMOTIONALLY, OR SEXUALLY BY ANYONE?: NO

## 2024-05-01 SDOH — SOCIAL STABILITY: SOCIAL INSECURITY: DO YOU FEEL UNSAFE GOING BACK TO THE PLACE WHERE YOU ARE LIVING?: NO

## 2024-05-01 SDOH — SOCIAL STABILITY: SOCIAL INSECURITY: HAS ANYONE EVER THREATENED TO HURT YOUR FAMILY OR YOUR PETS?: NO

## 2024-05-01 SDOH — SOCIAL STABILITY: SOCIAL INSECURITY: DOES ANYONE TRY TO KEEP YOU FROM HAVING/CONTACTING OTHER FRIENDS OR DOING THINGS OUTSIDE YOUR HOME?: NO

## 2024-05-01 SDOH — SOCIAL STABILITY: SOCIAL INSECURITY: HAVE YOU HAD THOUGHTS OF HARMING ANYONE ELSE?: NO

## 2024-05-01 SDOH — SOCIAL STABILITY: SOCIAL INSECURITY: ARE THERE ANY APPARENT SIGNS OF INJURIES/BEHAVIORS THAT COULD BE RELATED TO ABUSE/NEGLECT?: NO

## 2024-05-01 SDOH — SOCIAL STABILITY: SOCIAL INSECURITY: DO YOU FEEL ANYONE HAS EXPLOITED OR TAKEN ADVANTAGE OF YOU FINANCIALLY OR OF YOUR PERSONAL PROPERTY?: NO

## 2024-05-01 ASSESSMENT — LIFESTYLE VARIABLES
HOW OFTEN DO YOU HAVE A DRINK CONTAINING ALCOHOL: NEVER
AUDIT-C TOTAL SCORE: 0
AUDIT-C TOTAL SCORE: 0
SKIP TO QUESTIONS 9-10: 1
HOW MANY STANDARD DRINKS CONTAINING ALCOHOL DO YOU HAVE ON A TYPICAL DAY: PATIENT DOES NOT DRINK
HOW OFTEN DO YOU HAVE 6 OR MORE DRINKS ON ONE OCCASION: NEVER

## 2024-05-01 ASSESSMENT — PAIN DESCRIPTION - LOCATION
LOCATION: FACE
LOCATION: FACE

## 2024-05-01 ASSESSMENT — ACTIVITIES OF DAILY LIVING (ADL)
HEARING - LEFT EAR: FUNCTIONAL
PATIENT'S MEMORY ADEQUATE TO SAFELY COMPLETE DAILY ACTIVITIES?: YES
FEEDING YOURSELF: INDEPENDENT
BATHING: INDEPENDENT
DRESSING YOURSELF: INDEPENDENT
ADEQUATE_TO_COMPLETE_ADL: YES
TOILETING: INDEPENDENT
WALKS IN HOME: INDEPENDENT
GROOMING: INDEPENDENT
JUDGMENT_ADEQUATE_SAFELY_COMPLETE_DAILY_ACTIVITIES: YES
HEARING - RIGHT EAR: FUNCTIONAL
ASSISTIVE_DEVICE: DENTURES UPPER;DENTURES LOWER
LACK_OF_TRANSPORTATION: NO

## 2024-05-01 ASSESSMENT — PAIN - FUNCTIONAL ASSESSMENT
PAIN_FUNCTIONAL_ASSESSMENT: 0-10
PAIN_FUNCTIONAL_ASSESSMENT: UNABLE TO SELF-REPORT
PAIN_FUNCTIONAL_ASSESSMENT: 0-10

## 2024-05-01 ASSESSMENT — COGNITIVE AND FUNCTIONAL STATUS - GENERAL
DAILY ACTIVITIY SCORE: 24
PATIENT BASELINE BEDBOUND: NO
MOBILITY SCORE: 24

## 2024-05-01 ASSESSMENT — COLUMBIA-SUICIDE SEVERITY RATING SCALE - C-SSRS
1. IN THE PAST MONTH, HAVE YOU WISHED YOU WERE DEAD OR WISHED YOU COULD GO TO SLEEP AND NOT WAKE UP?: NO
2. HAVE YOU ACTUALLY HAD ANY THOUGHTS OF KILLING YOURSELF?: NO
6. HAVE YOU EVER DONE ANYTHING, STARTED TO DO ANYTHING, OR PREPARED TO DO ANYTHING TO END YOUR LIFE?: NO

## 2024-05-01 ASSESSMENT — ENCOUNTER SYMPTOMS
CHEST TIGHTNESS: 0
MYALGIAS: 0
VOMITING: 0
SEIZURES: 0
NECK PAIN: 0
ABDOMINAL PAIN: 0
HEADACHES: 0
WHEEZING: 0
DIZZINESS: 0
FREQUENCY: 0
EYE PAIN: 1
DIFFICULTY URINATING: 0
ACTIVITY CHANGE: 0
WEAKNESS: 0
NECK STIFFNESS: 0
BRUISES/BLEEDS EASILY: 0
ABDOMINAL DISTENTION: 0
NAUSEA: 0
FACIAL SWELLING: 0
APPETITE CHANGE: 0
SHORTNESS OF BREATH: 0
JOINT SWELLING: 0
CHILLS: 0
COUGH: 0
HALLUCINATIONS: 0
DIARRHEA: 0
WOUND: 0
NERVOUS/ANXIOUS: 0
BACK PAIN: 0
FEVER: 0

## 2024-05-01 ASSESSMENT — PAIN SCALES - GENERAL
PAINLEVEL_OUTOF10: 0 - NO PAIN
PAINLEVEL_OUTOF10: 7
PAINLEVEL_OUTOF10: 0 - NO PAIN
PAINLEVEL_OUTOF10: 1
PAINLEVEL_OUTOF10: 0 - NO PAIN
PAINLEVEL_OUTOF10: 0 - NO PAIN
PAINLEVEL_OUTOF10: 5 - MODERATE PAIN

## 2024-05-01 ASSESSMENT — PATIENT HEALTH QUESTIONNAIRE - PHQ9
1. LITTLE INTEREST OR PLEASURE IN DOING THINGS: NOT AT ALL
SUM OF ALL RESPONSES TO PHQ9 QUESTIONS 1 & 2: 0
2. FEELING DOWN, DEPRESSED OR HOPELESS: NOT AT ALL

## 2024-05-01 ASSESSMENT — PAIN DESCRIPTION - ORIENTATION
ORIENTATION: RIGHT
ORIENTATION: RIGHT
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
## 2024-05-01 NOTE — ANESTHESIA PROCEDURE NOTES
Peripheral IV  Date/Time: 5/1/2024 4:32 PM  Inserted by: Katelyn Qureshi MD    Placement  Needle size: 18 G  Laterality: right  Location: hand  Local anesthetic: none  Site prep: alcohol  Technique: anatomical landmarks  Attempts: 2
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
## 2024-05-01 NOTE — ANESTHESIA PROCEDURE NOTES
Airway  Date/Time: 5/1/2024 4:30 PM  Urgency: elective    Airway not difficult    Staffing  Performed: SUZI   Authorized by: Katelyn Qureshi MD    Performed by: SUZI Burciaga  Patient location during procedure: OR    Indications and Patient Condition  Indications for airway management: anesthesia and airway protection  Spontaneous Ventilation: absent  Sedation level: deep  Preoxygenated: yes  Patient position: sniffing  MILS not maintained throughout  Mask difficulty assessment: 2 - vent by mask + OA or adjuvant +/- NMBA  Planned trial extubation    Final Airway Details  Final airway type: endotracheal airway      Successful airway: ETT  Cuffed: yes   Successful intubation technique: direct laryngoscopy  Facilitating devices/methods: intubating stylet  Endotracheal tube insertion site: oral  Blade: Zo  Blade size: #3  ETT size (mm): 7.0  Cormack-Lehane Classification: grade I - full view of glottis  Placement verified by: chest auscultation and capnometry   Inital cuff pressure (cm H2O): 0  Cuff volume (mL): 6  Measured from: lips  ETT to lips (cm): 23    Additional Comments  DL x1 and atraumatic intubation. ETT secured with silk tape and tegaderm. Lips and tongue in preanesthetic condition.
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
## 2024-05-02 ENCOUNTER — ANESTHESIA EVENT (OUTPATIENT)
Dept: OPERATING ROOM | Facility: HOSPITAL | Age: 64
End: 2024-05-02

## 2024-05-02 VITALS
HEART RATE: 75 BPM | WEIGHT: 196.21 LBS | HEIGHT: 65 IN | RESPIRATION RATE: 16 BRPM | SYSTOLIC BLOOD PRESSURE: 153 MMHG | DIASTOLIC BLOOD PRESSURE: 68 MMHG | TEMPERATURE: 97.2 F | BODY MASS INDEX: 32.69 KG/M2 | OXYGEN SATURATION: 96 %

## 2024-05-02 LAB
ALBUMIN SERPL BCP-MCNC: 3.6 G/DL (ref 3.4–5)
ANION GAP SERPL CALC-SCNC: 15 MMOL/L (ref 10–20)
BUN SERPL-MCNC: 12 MG/DL (ref 6–23)
CALCIUM SERPL-MCNC: 8.4 MG/DL (ref 8.6–10.6)
CHLORIDE SERPL-SCNC: 108 MMOL/L (ref 98–107)
CO2 SERPL-SCNC: 20 MMOL/L (ref 21–32)
CREAT SERPL-MCNC: 0.63 MG/DL (ref 0.5–1.05)
EGFRCR SERPLBLD CKD-EPI 2021: >90 ML/MIN/1.73M*2
ERYTHROCYTE [DISTWIDTH] IN BLOOD BY AUTOMATED COUNT: 12.5 % (ref 11.5–14.5)
GLUCOSE SERPL-MCNC: 151 MG/DL (ref 74–99)
HCT VFR BLD AUTO: 35.1 % (ref 36–46)
HGB BLD-MCNC: 12.1 G/DL (ref 12–16)
MAGNESIUM SERPL-MCNC: 2.08 MG/DL (ref 1.6–2.4)
MCH RBC QN AUTO: 31.8 PG (ref 26–34)
MCHC RBC AUTO-ENTMCNC: 34.5 G/DL (ref 32–36)
MCV RBC AUTO: 92 FL (ref 80–100)
NRBC BLD-RTO: 0 /100 WBCS (ref 0–0)
PHOSPHATE SERPL-MCNC: 3 MG/DL (ref 2.5–4.9)
PLATELET # BLD AUTO: 171 X10*3/UL (ref 150–450)
POTASSIUM SERPL-SCNC: 4 MMOL/L (ref 3.5–5.3)
RBC # BLD AUTO: 3.8 X10*6/UL (ref 4–5.2)
SODIUM SERPL-SCNC: 139 MMOL/L (ref 136–145)
WBC # BLD AUTO: 8.1 X10*3/UL (ref 4.4–11.3)

## 2024-05-02 PROCEDURE — 96365 THER/PROPH/DIAG IV INF INIT: CPT | Mod: 59 | Performed by: SURGERY

## 2024-05-02 PROCEDURE — 36415 COLL VENOUS BLD VENIPUNCTURE: CPT | Performed by: PHYSICIAN ASSISTANT

## 2024-05-02 PROCEDURE — 85027 COMPLETE CBC AUTOMATED: CPT | Performed by: PHYSICIAN ASSISTANT

## 2024-05-02 PROCEDURE — 2500000001 HC RX 250 WO HCPCS SELF ADMINISTERED DRUGS (ALT 637 FOR MEDICARE OP): Performed by: PHYSICIAN ASSISTANT

## 2024-05-02 PROCEDURE — 83735 ASSAY OF MAGNESIUM: CPT | Performed by: PHYSICIAN ASSISTANT

## 2024-05-02 PROCEDURE — 97165 OT EVAL LOW COMPLEX 30 MIN: CPT | Mod: GO

## 2024-05-02 PROCEDURE — 2500000004 HC RX 250 GENERAL PHARMACY W/ HCPCS (ALT 636 FOR OP/ED): Mod: JZ,JG | Performed by: PHYSICIAN ASSISTANT

## 2024-05-02 PROCEDURE — 80069 RENAL FUNCTION PANEL: CPT | Performed by: PHYSICIAN ASSISTANT

## 2024-05-02 PROCEDURE — G0378 HOSPITAL OBSERVATION PER HR: HCPCS

## 2024-05-02 PROCEDURE — 97161 PT EVAL LOW COMPLEX 20 MIN: CPT | Mod: GP | Performed by: PHYSICAL THERAPIST

## 2024-05-02 PROCEDURE — 99231 SBSQ HOSP IP/OBS SF/LOW 25: CPT

## 2024-05-02 RX ORDER — DROPERIDOL 2.5 MG/ML
0.62 INJECTION, SOLUTION INTRAMUSCULAR; INTRAVENOUS ONCE AS NEEDED
OUTPATIENT
Start: 2024-05-02

## 2024-05-02 RX ORDER — ONDANSETRON HYDROCHLORIDE 2 MG/ML
4 INJECTION, SOLUTION INTRAVENOUS ONCE AS NEEDED
OUTPATIENT
Start: 2024-05-02

## 2024-05-02 RX ORDER — AMOXICILLIN 250 MG
2 CAPSULE ORAL 2 TIMES DAILY
Qty: 40 TABLET | Refills: 0 | Status: SHIPPED | OUTPATIENT
Start: 2024-05-02 | End: 2024-05-06 | Stop reason: SDUPTHER

## 2024-05-02 RX ORDER — BACITRACIN ZINC 500 UNIT/G
OINTMENT (GRAM) TOPICAL 2 TIMES DAILY
Qty: 14 G | Refills: 0 | Status: SHIPPED | OUTPATIENT
Start: 2024-05-02 | End: 2024-05-05

## 2024-05-02 RX ORDER — OXYCODONE HYDROCHLORIDE 5 MG/1
5 TABLET ORAL EVERY 4 HOURS PRN
Status: DISCONTINUED | OUTPATIENT
Start: 2024-05-02 | End: 2024-05-02 | Stop reason: HOSPADM

## 2024-05-02 RX ORDER — BACITRACIN 500 [USP'U]/G
0.5 OINTMENT OPHTHALMIC 2 TIMES DAILY
Qty: 3.5 G | Refills: 0 | Status: SHIPPED | OUTPATIENT
Start: 2024-05-02 | End: 2024-05-02

## 2024-05-02 RX ORDER — HYDROMORPHONE HYDROCHLORIDE 1 MG/ML
0.2 INJECTION, SOLUTION INTRAMUSCULAR; INTRAVENOUS; SUBCUTANEOUS
Status: DISCONTINUED | OUTPATIENT
Start: 2024-05-02 | End: 2024-05-02 | Stop reason: HOSPADM

## 2024-05-02 RX ORDER — SODIUM CHLORIDE, SODIUM LACTATE, POTASSIUM CHLORIDE, CALCIUM CHLORIDE 600; 310; 30; 20 MG/100ML; MG/100ML; MG/100ML; MG/100ML
100 INJECTION, SOLUTION INTRAVENOUS CONTINUOUS
OUTPATIENT
Start: 2024-05-02

## 2024-05-02 RX ORDER — ACETAMINOPHEN 325 MG/1
975 TABLET ORAL EVERY 8 HOURS PRN
Qty: 90 TABLET | Refills: 0 | Status: SHIPPED | OUTPATIENT
Start: 2024-05-02 | End: 2024-05-12

## 2024-05-02 RX ORDER — OXYCODONE HYDROCHLORIDE 5 MG/1
5 TABLET ORAL EVERY 6 HOURS PRN
Qty: 20 TABLET | Refills: 0 | Status: SHIPPED | OUTPATIENT
Start: 2024-05-02 | End: 2024-05-07

## 2024-05-02 RX ORDER — BACITRACIN 500 [USP'U]/G
0.5 OINTMENT OPHTHALMIC 3 TIMES DAILY
Status: DISCONTINUED | OUTPATIENT
Start: 2024-05-02 | End: 2024-05-02 | Stop reason: HOSPADM

## 2024-05-02 RX ORDER — CLINDAMYCIN HYDROCHLORIDE 300 MG/1
600 CAPSULE ORAL 3 TIMES DAILY
Qty: 42 CAPSULE | Refills: 0 | Status: SHIPPED | OUTPATIENT
Start: 2024-05-02 | End: 2024-05-09

## 2024-05-02 RX ORDER — BACITRACIN 500 [USP'U]/G
0.5 OINTMENT OPHTHALMIC 2 TIMES DAILY
Qty: 3.5 G | Refills: 1 | Status: SHIPPED | OUTPATIENT
Start: 2024-05-02 | End: 2024-05-09

## 2024-05-02 RX ORDER — CLINDAMYCIN HYDROCHLORIDE 300 MG/1
600 CAPSULE ORAL 3 TIMES DAILY
Qty: 42 CAPSULE | Refills: 0 | Status: SHIPPED | OUTPATIENT
Start: 2024-05-01 | End: 2024-05-02 | Stop reason: HOSPADM

## 2024-05-02 RX ADMIN — CLINDAMYCIN PHOSPHATE 600 MG: 600 INJECTION, SOLUTION INTRAVENOUS at 11:21

## 2024-05-02 RX ADMIN — ACETAMINOPHEN 975 MG: 325 TABLET ORAL at 05:30

## 2024-05-02 RX ADMIN — CLINDAMYCIN PHOSPHATE 600 MG: 600 INJECTION, SOLUTION INTRAVENOUS at 03:31

## 2024-05-02 RX ADMIN — SENNOSIDES AND DOCUSATE SODIUM 2 TABLET: 8.6; 5 TABLET ORAL at 08:39

## 2024-05-02 RX ADMIN — ATORVASTATIN CALCIUM 40 MG: 40 TABLET, FILM COATED ORAL at 08:39

## 2024-05-02 RX ADMIN — CARVEDILOL 3.12 MG: 6.25 TABLET, FILM COATED ORAL at 08:39

## 2024-05-02 ASSESSMENT — COGNITIVE AND FUNCTIONAL STATUS - GENERAL
HELP NEEDED FOR BATHING: A LITTLE
DAILY ACTIVITIY SCORE: 23

## 2024-05-02 ASSESSMENT — PAIN DESCRIPTION - ORIENTATION: ORIENTATION: RIGHT

## 2024-05-02 ASSESSMENT — ACTIVITIES OF DAILY LIVING (ADL)
BATHING_ASSISTANCE: STAND BY
ADL_ASSISTANCE: INDEPENDENT

## 2024-05-02 ASSESSMENT — PAIN - FUNCTIONAL ASSESSMENT
PAIN_FUNCTIONAL_ASSESSMENT: 0-10

## 2024-05-02 ASSESSMENT — PAIN SCALES - GENERAL
PAINLEVEL_OUTOF10: 2
PAINLEVEL_OUTOF10: 2
PAINLEVEL_OUTOF10: 0 - NO PAIN
PAINLEVEL_OUTOF10: 2

## 2024-05-02 ASSESSMENT — PAIN DESCRIPTION - LOCATION: LOCATION: FACE

## 2024-05-02 NOTE — CARE PLAN
The clinical goals for the shift include Patient will report a tolerable level of pain tthroughout shift.      Problem: Pain  Goal: My pain/discomfort is manageable  Outcome: Progressing     Problem: Safety  Goal: Patient will be injury free during hospitalization  Outcome: Progressing     Problem: Safety  Goal: I will remain free of falls  Outcome: Progressing     Problem: Daily Care  Goal: Daily care needs are met  Outcome: Progressing     Problem: Psychosocial Needs  Goal: Demonstrates ability to cope with hospitalization/illness  Outcome: Progressing     Problem: Psychosocial Needs  Goal: Collaborate with me, my family, and caregiver to identify my specific goals  Outcome: Progressing  Flowsheets (Taken 5/1/2024 2017)  Cultural Requests During Hospitalization: none  Spiritual Requests During Hospitalization: none
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
## 2024-05-02 NOTE — CARE PLAN
The patient's goals for the shift include    Problem: Pain  Goal: My pain/discomfort is manageable  Outcome: Progressing     Problem: Safety  Goal: Patient will be injury free during hospitalization  Outcome: Progressing  Goal: I will remain free of falls  Outcome: Progressing     Problem: Daily Care  Goal: Daily care needs are met  Outcome: Progressing     Problem: Psychosocial Needs  Goal: Demonstrates ability to cope with hospitalization/illness  Outcome: Progressing  Goal: Collaborate with me, my family, and caregiver to identify my specific goals  Outcome: Progressing     Problem: Discharge Barriers  Goal: My discharge needs are met  Outcome: Progressing       The clinical goals for the shift include Patient will report a tolerable level of pain tthroughout shift.

## 2024-05-02 NOTE — NURSING NOTE
RN went over discharge instructions with pt. Pt understood instructions. Scripts sent to preferred pharmacy. IV removed. No other needs at this time
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
## 2024-05-02 NOTE — DISCHARGE INSTRUCTIONS
[facility]  DISCHARGE INSTRUCTIONS    You were admitted to [facility] from 4/30/24 - 5/2/24.    GENERAL INSTRUCTIONS  1) Diet: Resume regular diet that you were consuming prior to admission.     2) Activity:   - Move around as you are able  - Avoid strenuous activities including intensive movements as you are healing.   - Normal activity as tolerated until follow up visits.  Weight bearing status: no restrictions  Driving: No driving while taking narcotic medications and until follow up visits with facial surgeons and eye doctors.  SINUS PRECAUTIONS  ° Do not blow your nose for 2 weeks; you may wipe your nose gently.   ° Do not sneeze with mouth closed (have lips/mouth open while sneezing).  ° Do not drink through a straw or smoke.  ° You may use saline nasal spray (Ocean) and Afrin as needed for congestion and bleeding. If using Afrin please take as permitted on the bottle (for every 3 days of use you must take a 1 day holiday before using it again).    3) Medications:   - You are to resume all your home medications as previously prescribed. Additionally, you have been given a prescription for Tylenol (every 8 hours as needed for pain/fever/headache for 10 days), Oxycodone (every 6 hours as needed for moderate/severe pain for 5 days), and Ryanne-Colace (x7 days for constipation). You will also be required to take a 7 day course of Clindamycin 600mg every 8 hours x 7 days. Apply ophthalmic bacitracin to R eye twice daily x 7 days. Apply topical bacitracin to facial laceration twice daily for 3 days, then consider vaseline application. If refills are needed for your medications, please don't hesitate to reach out to your primary care provider.    4) Wound care:  Keep wounds clean, dry, and covered with a dressing as desired/necessary. No lotions, powders, or creams over wounds. No tub soaks. Apply bacitracin ointment to wounds/abrasions three times  per day and as needed for up to 7 days. If splint is in place, do not remove until follow up appointment and/or sutures will be removed in the office during your follow up appointment. Call the office immediately if you notice any sign of infection such as increased redness, warmth, thick drainage, wound separation, or spiking fever/chills.     -Apply ophthalmic bacitracin to R eye twice daily x 7 days.   -Apply topical bacitracin to facial laceration twice daily for 3 days, then consider vaseline application.    5) Follow up appointments:  - Trauma Surgery: A trauma clinic follow up is not necessary regarding your recent hospital admission. Follow up with the trauma surgery clinic as needed/desired. To follow up, please call the trauma clinic at [phone] to schedule your appointment. Do not hesitate to call our outpatient nurse coordinator at [phone] with any questions/concerns. The nurse will get back to you within 48-72 hours. If you feel it is an emergency, please proceed to your nearest Emergency Room.  - Primary Care Provider: Please follow up with your PCP within 1-2 weeks after discharge regarding your recent hospital admission. If you do not have a primary care provider, you may also call the hospital main number at [phone] and ask for a referral.  - Oral Surgery: Please follow up with the OMFS within 1-2 weeks for a post-op appointment. If you have not been called with 2-3 days, please call to schedule your follow up appointment.  Department of Oral & Maxillofacial Surgery  [address] [place] [address], 1st Floor ([facility])  [place] [address]     Office phone number: [phone].  Office fax number: [phone].  Patients can contact the [email or web address] through the hosptial  [phone].    - Ophthalmology: Please call ophthalmology clinic in 2 weeks for evaluation, please call [phone] to schedule your appointment.    6) Please notify  your physician if you have the following symptoms.     - Fever > 100.5 F (>38 C), chills  - Uncontrolled nausea and/or vomiting  - Chest pain  - New or worsening shortness of breathe  - Uncontrolled diarrhea   - You stop passing gas   - Have new bruising, rashes, blistering on your body  - New numbness/tingling, loss of feeling in any part of your body or a new decreased ability to move any part of your body  - New or worsening swelling  - Uncontrolled pain    If you display any of the following signs/symptoms: increased confusion, altered mental status, new numbness or tingling, decreased sensation or movement, pain that is uncontrolled with pain medications, increased shortness of breath, chest pain/palpitations, or any other concerning signs/symptoms, please proceed to your nearest Emergency Department for further evaluation and management.
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
## 2024-05-02 NOTE — PROGRESS NOTES
I met with Gavi at the bedside regarding discharge planning and home going needs. Patient lives home with her son where she is independent with ADL's without assistive devices. Patient is medically cleared for discharge home no needs via private transportation with all personal belongings. I will continue to follow until discharged.

## 2024-05-03 ENCOUNTER — APPOINTMENT (OUTPATIENT)
Dept: OPERATING ROOM | Facility: HOSPITAL | Age: 64
End: 2024-05-03
Payer: COMMERCIAL

## 2024-05-03 ENCOUNTER — ANESTHESIA (OUTPATIENT)
Dept: OPERATING ROOM | Facility: HOSPITAL | Age: 64
End: 2024-05-03
Payer: COMMERCIAL

## 2024-05-06 ENCOUNTER — OFFICE VISIT (OUTPATIENT)
Dept: PRIMARY CARE | Facility: CLINIC | Age: 64
End: 2024-05-06
Payer: COMMERCIAL

## 2024-05-06 VITALS
WEIGHT: 201 LBS | BODY MASS INDEX: 33.49 KG/M2 | HEIGHT: 65 IN | SYSTOLIC BLOOD PRESSURE: 142 MMHG | DIASTOLIC BLOOD PRESSURE: 62 MMHG

## 2024-05-06 DIAGNOSIS — I42.9 CARDIOMYOPATHY, UNSPECIFIED TYPE (MULTI): ICD-10-CM

## 2024-05-06 DIAGNOSIS — S02.85XB: ICD-10-CM

## 2024-05-06 DIAGNOSIS — K59.00 CONSTIPATION, UNSPECIFIED CONSTIPATION TYPE: Primary | ICD-10-CM
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
## 2024-05-06 LAB
ATRIAL RATE: 74 BPM
P AXIS: 21 DEGREES
P OFFSET: 203 MS
P ONSET: 155 MS
PR INTERVAL: 128 MS
Q ONSET: 219 MS
QRS COUNT: 12 BEATS
QRS DURATION: 86 MS
QT INTERVAL: 374 MS
QTC CALCULATION(BAZETT): 415 MS
QTC FREDERICIA: 401 MS
R AXIS: -9 DEGREES
T AXIS: 197 DEGREES
T OFFSET: 406 MS
VENTRICULAR RATE: 74 BPM

## 2024-05-06 PROCEDURE — 3078F DIAST BP <80 MM HG: CPT | Performed by: INTERNAL MEDICINE

## 2024-05-06 PROCEDURE — 99214 OFFICE O/P EST MOD 30 MIN: CPT | Performed by: INTERNAL MEDICINE

## 2024-05-06 PROCEDURE — 3077F SYST BP >= 140 MM HG: CPT | Performed by: INTERNAL MEDICINE

## 2024-05-07 ENCOUNTER — APPOINTMENT (OUTPATIENT)
Dept: CARDIOLOGY | Facility: HOSPITAL | Age: 64
End: 2024-05-07
Payer: COMMERCIAL

## 2024-05-07 ENCOUNTER — APPOINTMENT (OUTPATIENT)
Dept: RADIOLOGY | Facility: HOSPITAL | Age: 64
End: 2024-05-07
Payer: COMMERCIAL

## 2024-05-07 ENCOUNTER — APPOINTMENT (OUTPATIENT)
Dept: VASCULAR MEDICINE | Facility: HOSPITAL | Age: 64
End: 2024-05-07
Payer: COMMERCIAL

## 2024-05-14 ENCOUNTER — APPOINTMENT (OUTPATIENT)
Dept: CARDIOLOGY | Facility: CLINIC | Age: 64
End: 2024-05-14
Payer: COMMERCIAL

## 2024-05-20 NOTE — PROGRESS NOTES
Follow Up Bariatric Nutrition Assessment                  Name: Gavi Tejeda  MRN: [id number]  Date: 05/20/24    Surgery Date:  11/17/2016  Surgeon:  Ranjan        Procedure:  sleeve gastrectomy    ASSESSMENT:    Current weight:    There were no vitals filed for this visit.              Ht:    BMI:  There is no height or weight on file to calculate BMI.  Previous weight:   202lbs  Initial start weight:   268lbs  EBW:  132lbs  Total weight change:  ***  %EBW Lost: ***    PROGRESS:    Nutrition Interventions for last encounter    Eat 60-70 g of protein per day. Have a protein shake in the morning.   Have a balanced plate. 1/2 veggies, 1/4 protein and 1/4 starch.   Make the foods at work off limits. Bring in your own meals and snacks.   Have sugar free popsicle available for when you have a sweet tooth.  Identify grazing behaviors. Only snack when you truly feel hungry.   By next month, try to only have 1.5 pots of coffee per day. Eliminate coffee.   Begin no drinking 30 min before, during the meal and for 30 minutes after the meal.  Try to take 20-30 minutes to eat your meals. Stop when you feel satisfied. You do not need to finish your plate.  Make your exercise hard. You goal is to get your heart rate up. Consider adding time to your walks or add an incline. As you feel comfortable, add more resistance exercises like the weight machine.   Take a recommended multivitamin 2 times a day (It needs to have Iron (15 mg), Zinc (10 mg), Copper (1 mg), Thiamin (Vitamin B1) (1.5 mg), and Folic Acid (400 mcg)). You can take CentrumAdult. Take calcium CITRATE 2-3x a day in 500-600mg doses. You want to get 1200-1500mg a day. Be sure to take calcium separately from your multivitamin. You need 500mcg of vitamin B12.           CHANGES IN TREATMENT:   Patient met goals: ***    24 hour food recall: ***  Breakfast:  ***  Snack: ***  Lunch: ***  Snack:   ***  Dinner:  ***  Snack: ***  Beverages: ***   Alcohol: no -never        Vitamins:  ***  Medications:   Current Outpatient Medications:     atorvastatin (Lipitor) 40 mg tablet, Take 1 tablet (40 mg) by mouth once daily., Disp: 90 tablet, Rfl: 3    carvedilol (Coreg) 3.125 mg tablet, Take 1 tablet (3.125 mg) by mouth 2 times a day with meals., Disp: 60 tablet, Rfl: 11  Physical Activity:  gym (treadmill or leg machine) 4-5x per week for 30-60 minutes     READINESS TO LEARN:  Motivation to learn:        ***  Interested      Understanding of instruction: ***Good    Fair     Poor     Anticipated Compliance: ***Good     Fair     Poor  Family Support: Unable to assess-family not present     Patient presents with post-op weight loss surgery sleeve gastrectomy. Pt is 8 years post op.   Patient has lost *** pounds since initial assessment accounting for ***% loss excess body weight.  Tolerating regular diet without difficulty.  Protein intake *** adequate for post-op individual. Fluid consumption ***adequate. Patient ***supplementing recommended vitamin/minerals. Pt states concerns/difficulties with ***.    Malnutrition Screening  Significant unintentional weight loss? n/a  Eating less than 75% of usual intake for more than 2 weeks? n/a     Nutrition Diagnosis:   Increased protein and nutrition needs related to altered GI function as evidenced by pt. s/p sleeve gastrectomy.  Food- and nutrition-related knowledge deficit related to lack of prior exposure to surgical weight loss information as evidenced by diet recall.     Nutrition Interventions:   Modify type and amount of food and nutrients within meals and snacks.  Comprehensive Nutrition Education  -Nutrition education materials:         Recommendations:    Eat 60-70 g of protein per day  Drink 64 oz. of zero calorie beverages per day  Continue no drinking 30 min before, during the meal and for 30 minutes after the meal  Increase intensity and duration of exercise  Continue current vit/min regimen    Nutrition Monitoring and Evaluation:    1-2 pounds weight loss per week  Criteria: weight check, food recall  Need for Follow-up: ***    Natasha Henning MS, RD, LD  Phone: [phone]

## 2024-05-24 ENCOUNTER — OFFICE VISIT (OUTPATIENT)
Dept: OPHTHALMOLOGY | Facility: CLINIC | Age: 64
End: 2024-05-24
Payer: COMMERCIAL

## 2024-05-24 DIAGNOSIS — H26.493 OTHER SECONDARY CATARACT, BILATERAL: ICD-10-CM

## 2024-05-24 DIAGNOSIS — S02.85XD: Primary | ICD-10-CM

## 2024-05-24 DIAGNOSIS — Z96.1 PSEUDOPHAKIA OF BOTH EYES: ICD-10-CM

## 2024-05-24 PROCEDURE — 99203 OFFICE O/P NEW LOW 30 MIN: CPT | Performed by: OPHTHALMOLOGY

## 2024-05-24 PROCEDURE — 99213 OFFICE O/P EST LOW 20 MIN: CPT | Performed by: OPHTHALMOLOGY

## 2024-05-24 ASSESSMENT — SLIT LAMP EXAM - LIDS
COMMENTS: NORMAL
COMMENTS: 1+ PTOSIS

## 2024-05-24 ASSESSMENT — ENCOUNTER SYMPTOMS
HEMATOLOGIC/LYMPHATIC NEGATIVE: 0
DEPRESSION: 0
EYES NEGATIVE: 0
OCCASIONAL FEELINGS OF UNSTEADINESS: 0
MUSCULOSKELETAL NEGATIVE: 0
ALLERGIC/IMMUNOLOGIC NEGATIVE: 0
PSYCHIATRIC NEGATIVE: 0
ENDOCRINE NEGATIVE: 0
CONSTITUTIONAL NEGATIVE: 1
NEUROLOGICAL NEGATIVE: 0
RESPIRATORY NEGATIVE: 0
LOSS OF SENSATION IN FEET: 0
CARDIOVASCULAR NEGATIVE: 0
GASTROINTESTINAL NEGATIVE: 0

## 2024-05-24 ASSESSMENT — VISUAL ACUITY
OD_SC+: +2
METHOD: SNELLEN - LINEAR
OD_PH_SC: 20/25
OS_SC: 20/25
OD_SC: 20/40

## 2024-05-24 ASSESSMENT — PAIN SCALES - GENERAL: PAINLEVEL: 0-NO PAIN

## 2024-05-24 ASSESSMENT — PATIENT HEALTH QUESTIONNAIRE - PHQ9
2. FEELING DOWN, DEPRESSED OR HOPELESS: NOT AT ALL
SUM OF ALL RESPONSES TO PHQ9 QUESTIONS 1 AND 2: 0
1. LITTLE INTEREST OR PLEASURE IN DOING THINGS: NOT AT ALL

## 2024-05-24 ASSESSMENT — CUP TO DISC RATIO
OD_RATIO: 0.3
OS_RATIO: 0.3

## 2024-05-24 ASSESSMENT — EXTERNAL EXAM - LEFT EYE: OS_EXAM: NORMAL

## 2024-05-24 NOTE — ASSESSMENT & PLAN NOTE
Well healing fracture repair with no evidence of new eye involvement. Advised to follow up as scheduled with care team. Should call if any diplopia or new ocular symptoms.

## 2024-05-24 NOTE — PATIENT INSTRUCTIONS
Thank you so much for choosing me to provide your care today!    If you were dilated your vision may remain blurry   or light sensitive for several hours.    The nature of eye and vision problems can require frequent follow up, please make every effort to adhere to any future appointments.    If you have any issues, questions, or concerns,   please do not hesitate to reach out.    If you receive a survey in regards to your care today, please mention any exceptional care my office staff and/or technicians provided.    You can reach our office at this number:  [phone]

## 2024-05-24 NOTE — PROGRESS NOTES
Assessment/Plan   Problem List Items Addressed This Visit       Open orbital fracture, with routine healing, subsequent encounter - Primary     Well healing fracture repair with no evidence of new eye involvement. Advised to follow up as scheduled with care team. Should call if any diplopia or new ocular symptoms.          Pseudophakia of both eyes     Stable appearing intraocular lens (IOL) both eyes (OU), will monitor.         Other secondary cataract, bilateral     Suspect borderline significant right eye (OD)>left eye (OS) as glare symptoms predominate. Will bring back in short term for refraction with glare testing, plan time for YAG PC as needed.             Provided reassurance regarding above diagnoses and care received in the office visit today. Discussed outcomes and options along with the importance of treatment compliance. Understands the importance of any follow up visits. Patient instructed to call/communicate with our office if any new issues, questions, or concerns.     Will plan to see back in few weeks for refraction, glare, and potential YAG PC or sooner PRN

## 2024-05-24 NOTE — ASSESSMENT & PLAN NOTE
Suspect borderline significant right eye (OD)>left eye (OS) as glare symptoms predominate. Will bring back in short term for refraction with glare testing, plan time for YAG PC as needed.

## 2024-05-27 RX ORDER — AMOXICILLIN 250 MG
2 CAPSULE ORAL 2 TIMES DAILY
Qty: 40 TABLET | Refills: 0 | Status: SHIPPED | OUTPATIENT
Start: 2024-05-27 | End: 2024-06-06

## 2024-05-28 ENCOUNTER — APPOINTMENT (OUTPATIENT)
Dept: SURGERY | Facility: CLINIC | Age: 64
End: 2024-05-28
Payer: COMMERCIAL
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
## 2024-05-30 ENCOUNTER — OFFICE VISIT (OUTPATIENT)
Dept: PRIMARY CARE | Facility: CLINIC | Age: 64
End: 2024-05-30
Payer: COMMERCIAL

## 2024-05-30 VITALS
SYSTOLIC BLOOD PRESSURE: 132 MMHG | BODY MASS INDEX: 33.66 KG/M2 | WEIGHT: 202 LBS | HEIGHT: 65 IN | DIASTOLIC BLOOD PRESSURE: 74 MMHG

## 2024-05-30 DIAGNOSIS — R42 VERTIGO: ICD-10-CM

## 2024-05-30 PROCEDURE — 3078F DIAST BP <80 MM HG: CPT | Performed by: INTERNAL MEDICINE

## 2024-05-30 PROCEDURE — 3075F SYST BP GE 130 - 139MM HG: CPT | Performed by: INTERNAL MEDICINE

## 2024-05-30 PROCEDURE — 99213 OFFICE O/P EST LOW 20 MIN: CPT | Performed by: INTERNAL MEDICINE

## 2024-05-30 RX ORDER — MECLIZINE HYDROCHLORIDE 25 MG/1
25 TABLET ORAL 3 TIMES DAILY PRN
Qty: 90 TABLET | Refills: 0 | Status: SHIPPED | OUTPATIENT
Start: 2024-05-30 | End: 2025-05-30

## 2024-05-30 RX ORDER — ONDANSETRON 4 MG/1
4 TABLET, FILM COATED ORAL EVERY 8 HOURS PRN
Qty: 60 TABLET | Refills: 0 | Status: SHIPPED | OUTPATIENT
Start: 2024-05-30 | End: 2024-08-28
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
## 2024-06-04 ENCOUNTER — HOSPITAL ENCOUNTER (OUTPATIENT)
Dept: CARDIOLOGY | Facility: HOSPITAL | Age: 64
End: 2024-06-04
Payer: COMMERCIAL

## 2024-06-04 ENCOUNTER — HOSPITAL ENCOUNTER (OUTPATIENT)
Dept: RADIOLOGY | Facility: HOSPITAL | Age: 64
End: 2024-06-04
Payer: COMMERCIAL

## 2024-06-04 ENCOUNTER — HOSPITAL ENCOUNTER (OUTPATIENT)
Dept: RADIOLOGY | Facility: HOSPITAL | Age: 64
Discharge: HOME | End: 2024-06-04
Payer: COMMERCIAL

## 2024-06-04 ENCOUNTER — HOSPITAL ENCOUNTER (OUTPATIENT)
Dept: CARDIOLOGY | Facility: HOSPITAL | Age: 64
Discharge: HOME | End: 2024-06-04
Payer: COMMERCIAL

## 2024-06-04 DIAGNOSIS — R42 DIZZINESS: ICD-10-CM

## 2024-06-04 DIAGNOSIS — I42.9 CARDIOMYOPATHY, UNSPECIFIED TYPE (MULTI): ICD-10-CM

## 2024-06-04 DIAGNOSIS — R07.9 CHEST PAIN, UNSPECIFIED TYPE: ICD-10-CM

## 2024-06-04 DIAGNOSIS — I50.9 HEART FAILURE, UNSPECIFIED (MULTI): ICD-10-CM

## 2024-06-04 DIAGNOSIS — R94.31 ABNORMAL EKG: ICD-10-CM

## 2024-06-04 DIAGNOSIS — I10 HYPERTENSION, UNSPECIFIED TYPE: ICD-10-CM

## 2024-06-04 DIAGNOSIS — R06.09 DOE (DYSPNEA ON EXERTION): ICD-10-CM

## 2024-06-05 ENCOUNTER — CLINICAL SUPPORT (OUTPATIENT)
Dept: OPHTHALMOLOGY | Facility: CLINIC | Age: 64
End: 2024-06-05
Payer: COMMERCIAL

## 2024-06-05 ENCOUNTER — OFFICE VISIT (OUTPATIENT)
Dept: OPHTHALMOLOGY | Facility: CLINIC | Age: 64
End: 2024-06-05
Payer: COMMERCIAL

## 2024-06-05 DIAGNOSIS — S02.85XD: Primary | ICD-10-CM

## 2024-06-05 DIAGNOSIS — H26.493 OTHER SECONDARY CATARACT, BILATERAL: ICD-10-CM

## 2024-06-05 DIAGNOSIS — H52.7 REFRACTION ERROR: ICD-10-CM

## 2024-06-05 PROCEDURE — 92015 DETERMINE REFRACTIVE STATE: CPT | Performed by: OPHTHALMOLOGY

## 2024-06-05 PROCEDURE — 99212 OFFICE O/P EST SF 10 MIN: CPT | Performed by: OPHTHALMOLOGY

## 2024-06-05 ASSESSMENT — ENCOUNTER SYMPTOMS
CONSTITUTIONAL NEGATIVE: 1
EYES NEGATIVE: 0
ENDOCRINE NEGATIVE: 0
RESPIRATORY NEGATIVE: 0
PSYCHIATRIC NEGATIVE: 0
MUSCULOSKELETAL NEGATIVE: 0
CARDIOVASCULAR NEGATIVE: 0
NEUROLOGICAL NEGATIVE: 0
ALLERGIC/IMMUNOLOGIC NEGATIVE: 0
HEMATOLOGIC/LYMPHATIC NEGATIVE: 0
GASTROINTESTINAL NEGATIVE: 0

## 2024-06-05 ASSESSMENT — VISUAL ACUITY
OS_SC: 20/30
OD_SC+: -1
OD_BAT_HIGH: 20/30
OS_SC+: +1
METHOD: SNELLEN - SINGLE
OS_BAT_HIGH: 20/40
OD_SC: 20/40

## 2024-06-05 ASSESSMENT — SLIT LAMP EXAM - LIDS
COMMENTS: 1+ PTOSIS
COMMENTS: NORMAL

## 2024-06-05 ASSESSMENT — KERATOMETRY
OS_K1POWER_DIOPTERS: 42.50
OD_AXISANGLE_DEGREES: 150
OD_K1POWER_DIOPTERS: 41.50
OS_AXISANGLE2_DEGREES: 95
METHOD_AUTO_MANUAL: AUTOMATED
OD_K2POWER_DIOPTERS: 42.25
OD_AXISANGLE2_DEGREES: 60
OS_K2POWER_DIOPTERS: 43.25
OS_AXISANGLE_DEGREES: 5

## 2024-06-05 ASSESSMENT — PAIN SCALES - GENERAL: PAINLEVEL: 0-NO PAIN

## 2024-06-05 ASSESSMENT — PATIENT HEALTH QUESTIONNAIRE - PHQ9
2. FEELING DOWN, DEPRESSED OR HOPELESS: NOT AT ALL
1. LITTLE INTEREST OR PLEASURE IN DOING THINGS: NOT AT ALL
SUM OF ALL RESPONSES TO PHQ9 QUESTIONS 1 AND 2: 0

## 2024-06-05 ASSESSMENT — REFRACTION_MANIFEST
OS_AXIS: 095
OD_SPHERE: -0.00
METHOD_AUTOREFRACTION: 1
OD_AXIS: 080
OS_SPHERE: -0.50
OS_CYLINDER: -1.00
OD_CYLINDER: -1.25

## 2024-06-05 ASSESSMENT — EXTERNAL EXAM - LEFT EYE: OS_EXAM: NORMAL

## 2024-06-05 NOTE — PATIENT INSTRUCTIONS
Thank you so much for choosing me to provide your care today!    If you were dilated your vision may remain blurry   or light sensitive for several hours.    The nature of eye and vision problems can require frequent follow up, please make every effort to adhere to any future appointments.    If you have any issues, questions, or concerns,   please do not hesitate to reach out.    If you receive a survey in regards to your care today, please mention any exceptional care my office staff and/or technicians provided.    You can reach our office at this number:  [phone]

## 2024-06-07 ENCOUNTER — HOSPITAL ENCOUNTER (OUTPATIENT)
Dept: CARDIOLOGY | Facility: HOSPITAL | Age: 64
Discharge: HOME | End: 2024-06-07
Payer: COMMERCIAL

## 2024-06-07 ENCOUNTER — HOSPITAL ENCOUNTER (OUTPATIENT)
Dept: RADIOLOGY | Facility: HOSPITAL | Age: 64
Discharge: HOME | End: 2024-06-07
Payer: COMMERCIAL

## 2024-06-07 DIAGNOSIS — R06.09 DOE (DYSPNEA ON EXERTION): ICD-10-CM

## 2024-06-07 DIAGNOSIS — R42 DIZZINESS: ICD-10-CM

## 2024-06-07 DIAGNOSIS — R94.31 ABNORMAL EKG: ICD-10-CM

## 2024-06-07 DIAGNOSIS — I42.9 CARDIOMYOPATHY, UNSPECIFIED TYPE (MULTI): ICD-10-CM

## 2024-06-07 DIAGNOSIS — R07.9 CHEST PAIN, UNSPECIFIED TYPE: ICD-10-CM

## 2024-06-07 DIAGNOSIS — I10 HYPERTENSION, UNSPECIFIED TYPE: ICD-10-CM

## 2024-06-07 LAB
AORTIC VALVE PEAK VELOCITY: 1.43 M/S
AV PEAK GRADIENT: 8.2 MMHG
AVA (PEAK VEL): 1.36 CM2
EJECTION FRACTION APICAL 4 CHAMBER: 42.2
GLOBAL LONGITUDINAL STRAIN: -13.3 %
LEFT ATRIUM VOLUME AREA LENGTH INDEX BSA: 29.8 ML/M2
LEFT VENTRICLE INTERNAL DIMENSION DIASTOLE: 6.02 CM (ref 3.5–6)
LEFT VENTRICULAR OUTFLOW TRACT DIAMETER: 2 CM
LV EJECTION FRACTION BIPLANE: 41 %
MITRAL VALVE E/A RATIO: 0.6
MITRAL VALVE E/E' RATIO: 11.76
RIGHT VENTRICLE FREE WALL PEAK S': 10.4 CM/S
RIGHT VENTRICLE PEAK SYSTOLIC PRESSURE: 17 MMHG
TRICUSPID ANNULAR PLANE SYSTOLIC EXCURSION: 1.8 CM

## 2024-06-07 PROCEDURE — 93018 CV STRESS TEST I&R ONLY: CPT | Performed by: INTERNAL MEDICINE

## 2024-06-07 PROCEDURE — 93306 TTE W/DOPPLER COMPLETE: CPT

## 2024-06-07 PROCEDURE — A9502 TC99M TETROFOSMIN: HCPCS | Performed by: STUDENT IN AN ORGANIZED HEALTH CARE EDUCATION/TRAINING PROGRAM

## 2024-06-07 PROCEDURE — 93016 CV STRESS TEST SUPVJ ONLY: CPT | Performed by: INTERNAL MEDICINE

## 2024-06-07 PROCEDURE — 78452 HT MUSCLE IMAGE SPECT MULT: CPT | Performed by: NUCLEAR MEDICINE

## 2024-06-07 PROCEDURE — 3430000001 HC RX 343 DIAGNOSTIC RADIOPHARMACEUTICALS: Performed by: STUDENT IN AN ORGANIZED HEALTH CARE EDUCATION/TRAINING PROGRAM

## 2024-06-07 PROCEDURE — 93017 CV STRESS TEST TRACING ONLY: CPT

## 2024-06-07 PROCEDURE — 93306 TTE W/DOPPLER COMPLETE: CPT | Performed by: INTERNAL MEDICINE

## 2024-06-07 PROCEDURE — 78452 HT MUSCLE IMAGE SPECT MULT: CPT

## 2024-06-07 RX ADMIN — TETROFOSMIN 34 MILLICURIE: 0.23 INJECTION, POWDER, LYOPHILIZED, FOR SOLUTION INTRAVENOUS at 12:09

## 2024-06-07 RX ADMIN — TETROFOSMIN 11 MILLICURIE: 0.23 INJECTION, POWDER, LYOPHILIZED, FOR SOLUTION INTRAVENOUS at 10:50

## 2024-06-18 ENCOUNTER — TELEPHONE (OUTPATIENT)
Dept: SURGERY | Facility: CLINIC | Age: 64
End: 2024-06-18
Payer: COMMERCIAL
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
## 2024-06-24 ENCOUNTER — APPOINTMENT (OUTPATIENT)
Dept: SURGERY | Facility: CLINIC | Age: 64
End: 2024-06-24
Payer: COMMERCIAL

## 2024-07-09 ENCOUNTER — OFFICE VISIT (OUTPATIENT)
Dept: CARDIOLOGY | Facility: HOSPITAL | Age: 64
End: 2024-07-09
Payer: COMMERCIAL

## 2024-07-09 VITALS
HEART RATE: 64 BPM | DIASTOLIC BLOOD PRESSURE: 80 MMHG | WEIGHT: 200.18 LBS | SYSTOLIC BLOOD PRESSURE: 121 MMHG | BODY MASS INDEX: 33.31 KG/M2 | OXYGEN SATURATION: 96 %

## 2024-07-09 DIAGNOSIS — R07.9 CHEST PAIN, UNSPECIFIED TYPE: Primary | ICD-10-CM

## 2024-07-09 DIAGNOSIS — I42.9 CARDIOMYOPATHY, UNSPECIFIED TYPE (MULTI): ICD-10-CM

## 2024-07-09 DIAGNOSIS — F17.219 CIGARETTE NICOTINE DEPENDENCE WITH NICOTINE-INDUCED DISORDER: ICD-10-CM

## 2024-07-09 PROCEDURE — 3079F DIAST BP 80-89 MM HG: CPT | Performed by: STUDENT IN AN ORGANIZED HEALTH CARE EDUCATION/TRAINING PROGRAM

## 2024-07-09 PROCEDURE — 99215 OFFICE O/P EST HI 40 MIN: CPT | Performed by: STUDENT IN AN ORGANIZED HEALTH CARE EDUCATION/TRAINING PROGRAM

## 2024-07-09 PROCEDURE — 3074F SYST BP LT 130 MM HG: CPT | Performed by: STUDENT IN AN ORGANIZED HEALTH CARE EDUCATION/TRAINING PROGRAM

## 2024-07-09 PROCEDURE — 99406 BEHAV CHNG SMOKING 3-10 MIN: CPT | Performed by: STUDENT IN AN ORGANIZED HEALTH CARE EDUCATION/TRAINING PROGRAM

## 2024-07-09 RX ORDER — IBUPROFEN 200 MG
1 TABLET ORAL EVERY 24 HOURS
Qty: 30 PATCH | Refills: 0 | Status: SHIPPED | OUTPATIENT
Start: 2024-07-09 | End: 2024-08-08
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
## 2024-07-10 ENCOUNTER — APPOINTMENT (OUTPATIENT)
Dept: PHYSICAL THERAPY | Facility: CLINIC | Age: 64
End: 2024-07-10
Payer: COMMERCIAL

## 2024-07-11 ENCOUNTER — EVALUATION (OUTPATIENT)
Dept: PHYSICAL THERAPY | Facility: CLINIC | Age: 64
End: 2024-07-11
Payer: COMMERCIAL

## 2024-07-11 DIAGNOSIS — R42 VERTIGO: Primary | ICD-10-CM

## 2024-07-11 DIAGNOSIS — M54.2 CERVICALGIA: ICD-10-CM

## 2024-07-11 PROCEDURE — 97162 PT EVAL MOD COMPLEX 30 MIN: CPT | Mod: GP

## 2024-07-11 PROCEDURE — 97112 NEUROMUSCULAR REEDUCATION: CPT | Mod: GP

## 2024-07-11 ASSESSMENT — ENCOUNTER SYMPTOMS: NO PATIENT REPORTED PAIN: 1
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
## 2024-07-12 ASSESSMENT — PAIN SCALES - GENERAL: PAINLEVEL_OUTOF10: 0 - NO PAIN

## 2024-07-12 ASSESSMENT — PAIN - FUNCTIONAL ASSESSMENT: PAIN_FUNCTIONAL_ASSESSMENT: 0-10

## 2024-07-15 ASSESSMENT — ENCOUNTER SYMPTOMS: DIZZINESS: 1

## 2024-07-15 ASSESSMENT — ACTIVITIES OF DAILY LIVING (ADL): POOR_BALANCE: 1

## 2024-07-16 ENCOUNTER — LAB (OUTPATIENT)
Dept: LAB | Facility: LAB | Age: 64
End: 2024-07-16
Payer: COMMERCIAL

## 2024-07-16 DIAGNOSIS — R07.9 CHEST PAIN, UNSPECIFIED TYPE: ICD-10-CM

## 2024-07-16 LAB
ANION GAP SERPL CALC-SCNC: 15 MMOL/L (ref 10–20)
BUN SERPL-MCNC: 15 MG/DL (ref 6–23)
CALCIUM SERPL-MCNC: 9.1 MG/DL (ref 8.6–10.6)
CHLORIDE SERPL-SCNC: 108 MMOL/L (ref 98–107)
CO2 SERPL-SCNC: 23 MMOL/L (ref 21–32)
CREAT SERPL-MCNC: 0.72 MG/DL (ref 0.5–1.05)
EGFRCR SERPLBLD CKD-EPI 2021: >90 ML/MIN/1.73M*2
ERYTHROCYTE [DISTWIDTH] IN BLOOD BY AUTOMATED COUNT: 12.4 % (ref 11.5–14.5)
GLUCOSE SERPL-MCNC: 84 MG/DL (ref 74–99)
HCT VFR BLD AUTO: 42 % (ref 36–46)
HGB BLD-MCNC: 14 G/DL (ref 12–16)
MCH RBC QN AUTO: 31.3 PG (ref 26–34)
MCHC RBC AUTO-ENTMCNC: 33.3 G/DL (ref 32–36)
MCV RBC AUTO: 94 FL (ref 80–100)
NRBC BLD-RTO: 0 /100 WBCS (ref 0–0)
PLATELET # BLD AUTO: 153 X10*3/UL (ref 150–450)
POTASSIUM SERPL-SCNC: 4.5 MMOL/L (ref 3.5–5.3)
RBC # BLD AUTO: 4.48 X10*6/UL (ref 4–5.2)
SODIUM SERPL-SCNC: 141 MMOL/L (ref 136–145)
WBC # BLD AUTO: 4.2 X10*3/UL (ref 4.4–11.3)

## 2024-07-16 PROCEDURE — 85027 COMPLETE CBC AUTOMATED: CPT

## 2024-07-16 PROCEDURE — 36415 COLL VENOUS BLD VENIPUNCTURE: CPT

## 2024-07-16 PROCEDURE — 80048 BASIC METABOLIC PNL TOTAL CA: CPT

## 2024-07-19 ENCOUNTER — HOSPITAL ENCOUNTER (OUTPATIENT)
Facility: HOSPITAL | Age: 64
Setting detail: OUTPATIENT SURGERY
Discharge: HOME | End: 2024-07-19
Attending: INTERNAL MEDICINE | Admitting: INTERNAL MEDICINE
Payer: COMMERCIAL

## 2024-07-19 VITALS
SYSTOLIC BLOOD PRESSURE: 138 MMHG | HEART RATE: 69 BPM | RESPIRATION RATE: 16 BRPM | OXYGEN SATURATION: 95 % | DIASTOLIC BLOOD PRESSURE: 67 MMHG

## 2024-07-19 DIAGNOSIS — I42.9 CARDIOMYOPATHY, UNSPECIFIED TYPE (MULTI): ICD-10-CM

## 2024-07-19 DIAGNOSIS — R07.9 CHEST PAIN, UNSPECIFIED TYPE: ICD-10-CM

## 2024-07-19 PROCEDURE — 7100000010 HC PHASE TWO TIME - EACH INCREMENTAL 1 MINUTE: Performed by: INTERNAL MEDICINE

## 2024-07-19 PROCEDURE — C1894 INTRO/SHEATH, NON-LASER: HCPCS | Performed by: INTERNAL MEDICINE

## 2024-07-19 PROCEDURE — 2500000004 HC RX 250 GENERAL PHARMACY W/ HCPCS (ALT 636 FOR OP/ED): Performed by: INTERNAL MEDICINE

## 2024-07-19 PROCEDURE — 93458 L HRT ARTERY/VENTRICLE ANGIO: CPT | Performed by: INTERNAL MEDICINE

## 2024-07-19 PROCEDURE — 7100000009 HC PHASE TWO TIME - INITIAL BASE CHARGE: Performed by: INTERNAL MEDICINE

## 2024-07-19 PROCEDURE — 2720000007 HC OR 272 NO HCPCS: Performed by: INTERNAL MEDICINE

## 2024-07-19 PROCEDURE — 96373 THER/PROPH/DIAG INJ IA: CPT | Performed by: INTERNAL MEDICINE

## 2024-07-19 PROCEDURE — C1887 CATHETER, GUIDING: HCPCS | Performed by: INTERNAL MEDICINE

## 2024-07-19 PROCEDURE — C1760 CLOSURE DEV, VASC: HCPCS | Performed by: INTERNAL MEDICINE

## 2024-07-19 PROCEDURE — 2500000005 HC RX 250 GENERAL PHARMACY W/O HCPCS: Performed by: INTERNAL MEDICINE

## 2024-07-19 PROCEDURE — 2550000001 HC RX 255 CONTRASTS: Performed by: INTERNAL MEDICINE

## 2024-07-19 RX ORDER — VERAPAMIL HYDROCHLORIDE 2.5 MG/ML
INJECTION, SOLUTION INTRAVENOUS AS NEEDED
Status: DISCONTINUED | OUTPATIENT
Start: 2024-07-19 | End: 2024-07-19 | Stop reason: HOSPADM

## 2024-07-19 RX ORDER — HEPARIN SODIUM 1000 [USP'U]/ML
INJECTION, SOLUTION INTRAVENOUS; SUBCUTANEOUS AS NEEDED
Status: DISCONTINUED | OUTPATIENT
Start: 2024-07-19 | End: 2024-07-19 | Stop reason: HOSPADM

## 2024-07-19 RX ORDER — FENTANYL CITRATE 50 UG/ML
INJECTION, SOLUTION INTRAMUSCULAR; INTRAVENOUS AS NEEDED
Status: DISCONTINUED | OUTPATIENT
Start: 2024-07-19 | End: 2024-07-19 | Stop reason: HOSPADM

## 2024-07-19 RX ORDER — NITROGLYCERIN 40 MG/100ML
INJECTION INTRAVENOUS AS NEEDED
Status: DISCONTINUED | OUTPATIENT
Start: 2024-07-19 | End: 2024-07-19 | Stop reason: HOSPADM

## 2024-07-19 RX ORDER — SODIUM CHLORIDE 9 MG/ML
INJECTION, SOLUTION INTRAVENOUS CONTINUOUS PRN
Status: COMPLETED | OUTPATIENT
Start: 2024-07-19 | End: 2024-07-19

## 2024-07-19 RX ORDER — MIDAZOLAM HYDROCHLORIDE 1 MG/ML
INJECTION, SOLUTION INTRAMUSCULAR; INTRAVENOUS AS NEEDED
Status: DISCONTINUED | OUTPATIENT
Start: 2024-07-19 | End: 2024-07-19 | Stop reason: HOSPADM

## 2024-07-19 RX ORDER — LIDOCAINE HYDROCHLORIDE 20 MG/ML
INJECTION, SOLUTION INFILTRATION; PERINEURAL AS NEEDED
Status: DISCONTINUED | OUTPATIENT
Start: 2024-07-19 | End: 2024-07-19 | Stop reason: HOSPADM

## 2024-07-19 ASSESSMENT — PAIN SCALES - GENERAL
PAINLEVEL_OUTOF10: 0 - NO PAIN

## 2024-07-19 ASSESSMENT — PAIN - FUNCTIONAL ASSESSMENT
PAIN_FUNCTIONAL_ASSESSMENT: 0-10

## 2024-07-19 ASSESSMENT — COLUMBIA-SUICIDE SEVERITY RATING SCALE - C-SSRS
1. IN THE PAST MONTH, HAVE YOU WISHED YOU WERE DEAD OR WISHED YOU COULD GO TO SLEEP AND NOT WAKE UP?: NO
6. HAVE YOU EVER DONE ANYTHING, STARTED TO DO ANYTHING, OR PREPARED TO DO ANYTHING TO END YOUR LIFE?: NO
2. HAVE YOU ACTUALLY HAD ANY THOUGHTS OF KILLING YOURSELF?: NO

## 2024-07-19 NOTE — Clinical Note
Closure device placed in the right radial artery. Site closed by radial compression system. TR band 13ml air

## 2024-07-19 NOTE — SIGNIFICANT EVENT
No change in assessment. Dressing applied to radial site. Discharge instructions given to pt. Pt getting dressed for discharge.

## 2024-07-22 NOTE — SIGNIFICANT EVENT
Cath Lab Procedure Call Back  Procedure Date: ___7/19/24____________   Procedure Performed:___LHC________________  Physician:______Stefano_________  Spoke with:_______Connie______________________  Date/Time Contacted: 1st attempt: __7/22/24 at 1013_______ ___:____ 2nd attempt: _________ ___:____  Phone#:_______________ Unable to reach/Left message:____________  Access Site: Pain______Bleeding______Bruised______Infection______WNL__xx____  Dressing Removal Date:______________ Anticoagulation Post Intervention_________  Satisfied with Care:______yes__________ D/C Instructions adequate____yes___________  Follow Up Appointment:____yes_________________ Length of Call:__2 min________________  Comments:______________________________________________________________________________________________________________________________________________

## 2024-08-06 ENCOUNTER — APPOINTMENT (OUTPATIENT)
Dept: PHYSICAL THERAPY | Facility: CLINIC | Age: 64
End: 2024-08-06
Payer: COMMERCIAL

## 2024-08-09 ENCOUNTER — TREATMENT (OUTPATIENT)
Dept: PHYSICAL THERAPY | Facility: CLINIC | Age: 64
End: 2024-08-09
Payer: COMMERCIAL

## 2024-08-09 DIAGNOSIS — R42 VERTIGO: ICD-10-CM

## 2024-08-09 PROCEDURE — 97112 NEUROMUSCULAR REEDUCATION: CPT | Mod: GP

## 2024-08-09 PROCEDURE — 97110 THERAPEUTIC EXERCISES: CPT | Mod: GP

## 2024-08-09 ASSESSMENT — PAIN SCALES - GENERAL: PAINLEVEL_OUTOF10: 0 - NO PAIN

## 2024-08-09 ASSESSMENT — PAIN - FUNCTIONAL ASSESSMENT: PAIN_FUNCTIONAL_ASSESSMENT: 0-10

## 2024-08-09 NOTE — PROGRESS NOTES
"Physical Therapy Treatment    Patient Name: Gavi Tejeda  MRN: [id number]  PT Received on: 8/9/2024    Time Calculation  Start Time: 0732  Stop Time: 0800  Time Calculation (min): 28 min  PT Therapeutic Procedures Time Entry  Neuromuscular Re-Education Time Entry: 18  Therapeutic Exercise Time Entry: 8    Visit # 2 of 10  Visits/Dates Authorized:  DEVOTED DUAL - NO AUTH / [place] VISITS / 100% COVERAGE    Current Problem:   Problem List Items Addressed This Visit             ICD-10-CM    Vertigo R42     Surgery: ORIF of right orbital fracture  Surgery date: 5/1/24    Precautions:    Dizziness    Subjective   General:    Patient reports dizziness symptoms are greatly improved since eval. She performs Daniel Nguyễn for HEP regularly, and that they've made a huge difference.    Pre-Treatment Symptoms:   Pain Assessment: 0-10  0-10 (Numeric) Pain Score: 0 - No pain / No dizziness    Objective   Findings:    Cervical ROM = WFL  No dizziness today with transitional movement       Treatments:      Therapeutic Exercise:   Supine DCF nods   Seated rotation SNAGs  HEP review and update    Neuromuscular Re-Ed:   Walk with head turns/nods and 360 turns  Foam NBOS and staggered stance, pert, EO/EC  Step taps foam 6\"    Deferred  Epley maneuver L, x1     Manual Therapy:       Modalities:       Assessment:    Patient without dizziness symptoms today, but challenged with foam balance activities, especially with EC. Updated and reviewed HEP. Discussed decreased frequency of therapy visits and instead performing HEP, with option to return to therapy if symptoms exacerbated.     Post-Treatment Symptoms:   Response to Interventions: No symptoms    Plan:    Patient will continue with HEP only with option to return to therapy if symptoms exacerbate.    Goals:   Active       PT Problem       STG       Start:  07/12/24    Expected End:  07/18/24       Patient will be independent with HEP.          LTGs       Start:  07/12/24    Expected End:  " 10/03/24       1. Patient will improve DHI to <10% impairment to indicate significant improvement in dizziness symptoms with daily activity.  2. Patient will improve cervical AROM to be WFL and pain-free in all planes, with no report of dizziness with cervical extension.  3. Patient will have negative Hope-Hallpike to R or L.  4. Patient will report no dizziness with supine<>sit transfers.

## 2024-08-13 ENCOUNTER — APPOINTMENT (OUTPATIENT)
Dept: PHYSICAL THERAPY | Facility: CLINIC | Age: 64
End: 2024-08-13
Payer: COMMERCIAL

## 2024-08-16 ENCOUNTER — APPOINTMENT (OUTPATIENT)
Dept: PHYSICAL THERAPY | Facility: CLINIC | Age: 64
End: 2024-08-16
Payer: COMMERCIAL

## 2024-08-20 ENCOUNTER — APPOINTMENT (OUTPATIENT)
Dept: PHYSICAL THERAPY | Facility: CLINIC | Age: 64
End: 2024-08-20
Payer: COMMERCIAL

## 2024-08-22 ENCOUNTER — APPOINTMENT (OUTPATIENT)
Dept: PHYSICAL THERAPY | Facility: CLINIC | Age: 64
End: 2024-08-22
Payer: COMMERCIAL

## 2024-08-27 ENCOUNTER — APPOINTMENT (OUTPATIENT)
Dept: PHYSICAL THERAPY | Facility: CLINIC | Age: 64
End: 2024-08-27
Payer: COMMERCIAL

## 2024-08-29 ENCOUNTER — APPOINTMENT (OUTPATIENT)
Dept: PHYSICAL THERAPY | Facility: CLINIC | Age: 64
End: 2024-08-29
Payer: COMMERCIAL

## 2024-09-03 ENCOUNTER — DOCUMENTATION (OUTPATIENT)
Dept: PHYSICAL THERAPY | Facility: CLINIC | Age: 64
End: 2024-09-03
Payer: COMMERCIAL

## 2024-09-03 NOTE — PROGRESS NOTES
Physical Therapy                 Therapy Communication Note    Patient Name: Gavi Tejeda  MRN: [id number]  Encounter Date: 9/3/2024    Discipline: Physical Therapy    Missed Time: No Show    Comment:

## 2024-09-05 ENCOUNTER — DOCUMENTATION (OUTPATIENT)
Dept: PHYSICAL THERAPY | Facility: CLINIC | Age: 64
End: 2024-09-05
Payer: COMMERCIAL

## 2024-09-05 NOTE — PROGRESS NOTES
Physical Therapy    Discharge Summary    Name: Gavi Tejeda  MRN: [id number]  : 1960  Date: 24    Discharge Summary: PT    Discharge Information: Date of discharge 9/3/24, Date of last visit 24, Date of evaluation 24, Number of attended visits 2, and Referred for Dizziness/vertigo    Therapy Summary: Final visit on 24 and doing much better at that time. Issued updated HEP at that visit. Patient did not return to clinic.    Discharge Status: Discharge and resolve case.     Rehab Discharge Reason: Achieved all and/or the most significant goals(s)

## 2024-09-10 DIAGNOSIS — Z12.31 ENCOUNTER FOR SCREENING MAMMOGRAM FOR BREAST CANCER: ICD-10-CM

## 2024-09-11 ENCOUNTER — APPOINTMENT (OUTPATIENT)
Dept: OPHTHALMOLOGY | Facility: CLINIC | Age: 64
End: 2024-09-11
Payer: COMMERCIAL

## 2024-10-07 ENCOUNTER — APPOINTMENT (OUTPATIENT)
Dept: OPHTHALMOLOGY | Facility: CLINIC | Age: 64
End: 2024-10-07
Payer: COMMERCIAL

## 2024-10-14 ENCOUNTER — APPOINTMENT (OUTPATIENT)
Dept: CARDIOLOGY | Facility: CLINIC | Age: 64
End: 2024-10-14
Payer: COMMERCIAL

## 2024-10-22 ENCOUNTER — OFFICE VISIT (OUTPATIENT)
Dept: CARDIOLOGY | Facility: HOSPITAL | Age: 64
End: 2024-10-22
Payer: COMMERCIAL

## 2024-10-22 VITALS
BODY MASS INDEX: 32.94 KG/M2 | DIASTOLIC BLOOD PRESSURE: 82 MMHG | WEIGHT: 197.97 LBS | HEART RATE: 74 BPM | OXYGEN SATURATION: 97 % | SYSTOLIC BLOOD PRESSURE: 125 MMHG

## 2024-10-22 DIAGNOSIS — F17.219 CIGARETTE NICOTINE DEPENDENCE WITH NICOTINE-INDUCED DISORDER: Primary | ICD-10-CM

## 2024-10-22 DIAGNOSIS — T14.8XXA BRUISING: ICD-10-CM

## 2024-10-22 DIAGNOSIS — I42.8 NICM (NONISCHEMIC CARDIOMYOPATHY) (MULTI): ICD-10-CM

## 2024-10-22 DIAGNOSIS — I10 PRIMARY HYPERTENSION: ICD-10-CM

## 2024-10-22 DIAGNOSIS — I42.9 CARDIOMYOPATHY, UNSPECIFIED TYPE (MULTI): ICD-10-CM

## 2024-10-22 PROCEDURE — 3079F DIAST BP 80-89 MM HG: CPT | Performed by: STUDENT IN AN ORGANIZED HEALTH CARE EDUCATION/TRAINING PROGRAM

## 2024-10-22 PROCEDURE — 99406 BEHAV CHNG SMOKING 3-10 MIN: CPT | Performed by: STUDENT IN AN ORGANIZED HEALTH CARE EDUCATION/TRAINING PROGRAM

## 2024-10-22 PROCEDURE — 99214 OFFICE O/P EST MOD 30 MIN: CPT | Performed by: STUDENT IN AN ORGANIZED HEALTH CARE EDUCATION/TRAINING PROGRAM

## 2024-10-22 PROCEDURE — 3074F SYST BP LT 130 MM HG: CPT | Performed by: STUDENT IN AN ORGANIZED HEALTH CARE EDUCATION/TRAINING PROGRAM

## 2024-10-22 RX ORDER — CARVEDILOL 3.12 MG/1
3.12 TABLET ORAL
Qty: 180 TABLET | Refills: 3 | Status: SHIPPED | OUTPATIENT
Start: 2024-10-22 | End: 2025-10-22

## 2024-10-22 RX ORDER — IBUPROFEN 200 MG
1 TABLET ORAL EVERY 24 HOURS
Qty: 30 PATCH | Refills: 0 | Status: SHIPPED | OUTPATIENT
Start: 2024-10-22 | End: 2024-11-21

## 2024-10-22 RX ORDER — ATORVASTATIN CALCIUM 40 MG/1
40 TABLET, FILM COATED ORAL DAILY
Qty: 90 TABLET | Refills: 3 | Status: SHIPPED | OUTPATIENT
Start: 2024-10-22 | End: 2025-10-22

## 2024-10-22 NOTE — PROGRESS NOTES
Primary Care Physician: Ashley Bradley MD   Date of Visit: 10/22/2024  1:00 PM EDT  Type of Visit: follow up       Chief Complaint:  No chief complaint on file.       HPI  Gavi Tejeda 63 y.o. female  with hx of NICM, based on cath done 2007, LVEF 30% and last reported LVEF 2020 was 40-45%, normal stress nuc 2018, TTE 4/2024 with LVEF 35-40%, LHC done with mild non-obstructive disease with normal LVED and no intervention done, HTN, COPD, MERRY, obesity s/p bariatric surgery/sleeve, smoker, TIA, cancer breast treated with neoadjuvant chemotherapy 6 cycles of Adriamycin 5-FU and Cytoxan followed by bilateral mastectomy, then finished 5 years Aromasin therapy      Here today for follow up    She has bruising, going on for a year, not taking blood thinners  No chest pain  No sob   She takes her meds 3 days a week   Does not take entresto   No dizziness or syncope    She still smokes  Active            Review of Systems   Review of Systems   12 points review of systems are negative expect for the above    Social History:  Social History     Socioeconomic History    Marital status: Single     Spouse name: Not on file    Number of children: Not on file    Years of education: Not on file    Highest education level: Not on file   Occupational History    Not on file   Tobacco Use    Smoking status: Every Day     Current packs/day: 1.00     Average packs/day: 1 pack/day for 44.8 years (44.8 ttl pk-yrs)     Types: Cigarettes     Start date: 1/1/1980     Passive exposure: Current    Smokeless tobacco: Never   Vaping Use    Vaping status: Never Used   Substance and Sexual Activity    Alcohol use: Not Currently     Comment: SOCIALLY    Drug use: Never    Sexual activity: Defer   Other Topics Concern    Not on file   Social History Narrative    ** Merged History Encounter **          Social Drivers of Health     Financial Resource Strain: Low Risk  (5/1/2024)    Overall Financial Resource Strain (CARDIA)     Difficulty of Paying  Living Expenses: Not hard at all   Food Insecurity: Not on file   Transportation Needs: No Transportation Needs (5/1/2024)    PRAPARE - Transportation     Lack of Transportation (Medical): No     Lack of Transportation (Non-Medical): No   Physical Activity: Not on file   Stress: Not on file   Social Connections: Not on file   Intimate Partner Violence: Not on file   Housing Stability: Low Risk  (5/1/2024)    Housing Stability Vital Sign     Unable to Pay for Housing in the Last Year: No     Number of Places Lived in the Last Year: 1     Unstable Housing in the Last Year: No        Past Medical History:  Past Medical History:   Diagnosis Date    Bariatric surgery status 01/09/2017    S/P bariatric surgery    Breast cancer (Multi)     Encounter for preprocedural laboratory examination 04/23/2019    Blood tests prior to treatment or procedure    Encounter for screening for malignant neoplasm of colon 05/07/2018    Encounter for screening colonoscopy    Hyperlipidemia     Hypertension     Morbid (severe) obesity due to excess calories (Multi) 04/11/2017    Morbid obesity with BMI of 45.0-49.9, adult    Nausea with vomiting, unspecified 03/03/2017    Post-operative nausea and vomiting    Nausea with vomiting, unspecified 11/11/2016    Post-operative nausea and vomiting    Other acute postprocedural pain 04/11/2017    Acute post-operative pain    Other conditions influencing health status     Breast Cancer    Personal history of nicotine dependence 05/07/2018    History of nicotine dependence    Stroke (Multi)     Vision loss        Past Surgical History:  Past Surgical History:   Procedure Laterality Date    CARDIAC CATHETERIZATION N/A 7/19/2024    Procedure: Left Heart Cath;  Surgeon: Anthony Curry MD;  Location: [facility] Cardiac [facility];  Service: Cardiovascular;  Laterality: N/A;    CATARACT EXTRACTION EXTRACAPSULAR W/ INTRAOCULAR LENS IMPLANTATION Bilateral     GALLBLADDER SURGERY  04/24/2013    Gallbladder Surgery     KNEE SURGERY  04/24/2013    Knee Surgery Left    LASIK      MASTECTOMY  05/24/2013    Breast Surgery Mastectomy    MASTECTOMY, RADICAL Left     MR HEAD ANGIO WO IV CONTRAST  09/19/2019    MR HEAD ANGIO WO IV CONTRAST [facility]    OTHER SURGICAL HISTORY  01/09/2017    Gastric Surgery For Morbid Obesity Laparoscopic Longitudinal Gastrectomy    OTHER SURGICAL HISTORY  02/29/2016    Breast Surgery Reconstruction       Family History:  No family history on file.     Objective:       7/19/2024    11:40 AM 7/19/2024    12:10 PM 7/19/2024    12:25 PM 7/19/2024    12:40 PM 7/19/2024    12:55 PM 7/19/2024     1:10 PM 10/22/2024     1:19 PM   Vitals   Systolic 143 144 153 158 145 138 125   Diastolic 76 86 82 78 74 67 82   Heart Rate 51 59 57 56 59 69 74   Resp 16 13 21 13 18 16    Weight (lb)       197.97   BMI       32.94 kg/m2   BSA (m2)       2.03 m2   Visit Report       Report      Constitutional:       Appearance: Healthy appearance. Not in distress.   Neck:      Vascular: No JVR. JVD normal.   Pulmonary:      Effort: Pulmonary effort is normal.      Breath sounds: Normal breath sounds. No wheezing. No rhonchi. No rales.   Chest:      Chest wall: Not tender to palpatation.   Cardiovascular:      Normal rate. Regular rhythm. Normal S1. Normal S2.       Murmurs: There is no murmur.      No gallop.  N No rub.   Pulses:     Intact distal pulses.   Edema:     Peripheral edema absent.   Abdominal:      General: Bowel sounds are normal.      Palpations: Abdomen is soft.      Tenderness: There is no abdominal tenderness.   Musculoskeletal: Normal range of motion.         General: No tenderness.   Skin:     General: Skin is warm and dry. Scattered bruising    Neurological:      General: No focal deficit present.      Mental Status: Alert and oriented to person, place and time.     Allergies:  Allergies   Allergen Reactions    Amoxicillin Swelling    Diph,Pertuss(Acel),Tet Vac(Pf) Swelling    Tetanus Immune Globulin  Swelling     severe to arm where injections was given    Tetanus And Diphther. Tox (Pf) Rash       Medications:  Current Outpatient Medications   Medication Instructions    atorvastatin (LIPITOR) 40 mg, oral, Daily    carvedilol (COREG) 3.125 mg, oral, 2 times daily (morning and late afternoon)    meclizine (ANTIVERT) 25 mg, oral, 3 times daily PRN    nicotine (Nicoderm CQ) 21 mg/24 hr patch 1 patch, transdermal, Every 24 hours    sacubitriL-valsartan (Entresto) 24-26 mg tablet 1 tablet, oral, 2 times daily        Labs and Imaging:     Lab Results   Component Value Date    WBC 4.2 (L) 07/16/2024    HGB 14.0 07/16/2024    HCT 42.0 07/16/2024     07/16/2024    CHOL 171 04/22/2024    TRIG 91 04/22/2024    HDL 56.1 04/22/2024    ALT 19 04/30/2024    AST 28 04/30/2024     07/16/2024    K 4.5 07/16/2024     (H) 07/16/2024    CREATININE 0.72 07/16/2024    BUN 15 07/16/2024    CO2 23 07/16/2024    TSH 1.73 04/22/2024    INR 1.0 04/30/2024    HGBA1C 5.0 08/03/2023         Echocardiogram: No results found for this or any previous visit from the past 1825 days.    Stress Testing: No results found for this or any previous visit from the past 1825 days.    Cardiac Catheterization: No results found for this or any previous visit from the past 1825 days.    Cardiac Scoring: No results found for this or any previous visit from the past 1825 days.    AAA : No results found for this or any previous visit from the past 1825 days.    OTHER: No results found for this or any previous visit from the past 1825 days.      The ASCVD Risk score (Jeanette CARSON, et al., 2019) failed to calculate for the following reasons:    Risk score cannot be calculated because patient has a medical history suggesting prior/existing ASCVD     Assessment/Plan:   1. Cigarette nicotine dependence with nicotine-induced disorder  nicotine (Nicoderm CQ) 21 mg/24 hr patch      2. NICM (nonischemic cardiomyopathy) (Multi)        3. Primary  hypertension        4. Cardiomyopathy, unspecified type (Multi)  atorvastatin (Lipitor) 40 mg tablet    carvedilol (Coreg) 3.125 mg tablet    sacubitriL-valsartan (Entresto) 24-26 mg tablet      5. Bruising           Gavi Tejeda 63 y.o. female  with hx of NICM, based on cath done 2007, LVEF 30% and last reported LVEF 2020 was 40-45%, normal stress nuc 2018, TTE 4/2024 with LVEF 35-40%, LHC done with mild non-obstructive disease with normal LVED and no intervention done, HTN, COPD, MERRY, obesity s/p bariatric surgery/sleeve, smoker, TIA, cancer breast treated with neoadjuvant chemotherapy 6 cycles of Adriamycin 5-FU and Cytoxan followed by bilateral mastectomy, then finished 5 years Aromasin therapy     Euvolemic on exam   NYHA class I     Plan  Ensure medication compliance   Carotid US   Continue liptor    Continue coreg   Continue Entresto     Defer ASA given bruising   Quit smoking, agrees to quit and to start patches   Advised to see Nutritionist/bariatric clinic, suspect vit deficiencies and malnutrition causing her bruising     Time Spent: I spent  minutes reviewing medical testing, obtaining medical history and counselling and educating on diagnosis and documenting clinical encounter.       3 min smoking cessation counseling   ____________________________________________________________  Amish Burroughs MD   of Medicine  Division of Cardiovascular Medicine   [facility] [place]  [facility]

## 2024-10-23 PROBLEM — T14.8XXA BRUISING: Status: ACTIVE | Noted: 2024-10-23

## 2024-10-28 ENCOUNTER — APPOINTMENT (OUTPATIENT)
Dept: OPHTHALMOLOGY | Facility: CLINIC | Age: 64
End: 2024-10-28
Payer: COMMERCIAL

## 2024-10-28 DIAGNOSIS — R42 VERTIGO: ICD-10-CM

## 2024-10-28 DIAGNOSIS — H26.493 OTHER SECONDARY CATARACT, BILATERAL: ICD-10-CM

## 2024-10-28 DIAGNOSIS — S02.85XD: Primary | ICD-10-CM

## 2024-10-28 PROCEDURE — 99212 OFFICE O/P EST SF 10 MIN: CPT | Performed by: OPHTHALMOLOGY

## 2024-10-28 ASSESSMENT — VISUAL ACUITY
OD_SC: 20/30
OD_SC+: +1
METHOD: SNELLEN - LINEAR
OS_SC: 20/60
OS_PH_SC: 20/40

## 2024-10-28 ASSESSMENT — ENCOUNTER SYMPTOMS
RESPIRATORY NEGATIVE: 0
HEMATOLOGIC/LYMPHATIC NEGATIVE: 0
PSYCHIATRIC NEGATIVE: 0
ALLERGIC/IMMUNOLOGIC NEGATIVE: 0
NEUROLOGICAL NEGATIVE: 0
GASTROINTESTINAL NEGATIVE: 0
CARDIOVASCULAR NEGATIVE: 0
CONSTITUTIONAL NEGATIVE: 0
EYES NEGATIVE: 0
MUSCULOSKELETAL NEGATIVE: 0
ENDOCRINE NEGATIVE: 0

## 2024-10-28 ASSESSMENT — PATIENT HEALTH QUESTIONNAIRE - PHQ9
SUM OF ALL RESPONSES TO PHQ9 QUESTIONS 1 AND 2: 0
2. FEELING DOWN, DEPRESSED OR HOPELESS: NOT AT ALL
1. LITTLE INTEREST OR PLEASURE IN DOING THINGS: NOT AT ALL

## 2024-10-28 ASSESSMENT — SLIT LAMP EXAM - LIDS
COMMENTS: TRACE PTOSIS
COMMENTS: NORMAL

## 2024-10-28 ASSESSMENT — PAIN SCALES - GENERAL: PAINLEVEL_OUTOF10: 0-NO PAIN

## 2024-10-28 ASSESSMENT — EXTERNAL EXAM - LEFT EYE: OS_EXAM: NORMAL

## 2024-11-05 ENCOUNTER — TELEPHONE (OUTPATIENT)
Dept: CARDIOLOGY | Facility: HOSPITAL | Age: 64
End: 2024-11-05
Payer: COMMERCIAL

## 2024-11-05 DIAGNOSIS — R42 DIZZINESS: ICD-10-CM

## 2024-12-02 ENCOUNTER — APPOINTMENT (OUTPATIENT)
Dept: CARDIOLOGY | Facility: CLINIC | Age: 64
End: 2024-12-02
Payer: COMMERCIAL

## 2025-03-24 ENCOUNTER — OFFICE VISIT (OUTPATIENT)
Dept: HEMATOLOGY/ONCOLOGY | Facility: CLINIC | Age: 65
End: 2025-03-24
Payer: MEDICARE

## 2025-03-24 VITALS
HEART RATE: 72 BPM | BODY MASS INDEX: 34.69 KG/M2 | TEMPERATURE: 96.4 F | SYSTOLIC BLOOD PRESSURE: 128 MMHG | OXYGEN SATURATION: 98 % | WEIGHT: 208.44 LBS | DIASTOLIC BLOOD PRESSURE: 84 MMHG | RESPIRATION RATE: 18 BRPM

## 2025-03-24 DIAGNOSIS — C50.011 MALIGNANT NEOPLASM OF NIPPLE OF RIGHT BREAST IN FEMALE, UNSPECIFIED ESTROGEN RECEPTOR STATUS: Primary | ICD-10-CM

## 2025-03-24 DIAGNOSIS — Z17.0 MALIGNANT NEOPLASM OF LEFT BREAST IN FEMALE, ESTROGEN RECEPTOR POSITIVE, UNSPECIFIED SITE OF BREAST: ICD-10-CM

## 2025-03-24 DIAGNOSIS — C50.912 MALIGNANT NEOPLASM OF LEFT BREAST IN FEMALE, ESTROGEN RECEPTOR POSITIVE, UNSPECIFIED SITE OF BREAST: ICD-10-CM

## 2025-03-24 LAB
ALBUMIN SERPL BCP-MCNC: 4.2 G/DL (ref 3.4–5)
ALP SERPL-CCNC: 93 U/L (ref 33–136)
ALT SERPL W P-5'-P-CCNC: 13 U/L (ref 7–45)
ANION GAP SERPL CALC-SCNC: 12 MMOL/L (ref 10–20)
AST SERPL W P-5'-P-CCNC: 19 U/L (ref 9–39)
BASOPHILS # BLD AUTO: 0.04 X10*3/UL (ref 0–0.1)
BASOPHILS NFR BLD AUTO: 0.9 %
BILIRUB SERPL-MCNC: 0.4 MG/DL (ref 0–1.2)
BUN SERPL-MCNC: 12 MG/DL (ref 6–23)
CALCIUM SERPL-MCNC: 9.2 MG/DL (ref 8.6–10.3)
CANCER AG27-29 SERPL-ACNC: 16.6 U/ML (ref 0–38.6)
CHLORIDE SERPL-SCNC: 108 MMOL/L (ref 98–107)
CO2 SERPL-SCNC: 26 MMOL/L (ref 21–32)
CREAT SERPL-MCNC: 0.64 MG/DL (ref 0.5–1.05)
EGFRCR SERPLBLD CKD-EPI 2021: >90 ML/MIN/1.73M*2
EOSINOPHIL # BLD AUTO: 0.07 X10*3/UL (ref 0–0.7)
EOSINOPHIL NFR BLD AUTO: 1.6 %
ERYTHROCYTE [DISTWIDTH] IN BLOOD BY AUTOMATED COUNT: 12.7 % (ref 11.5–14.5)
GLUCOSE SERPL-MCNC: 90 MG/DL (ref 74–99)
HCT VFR BLD AUTO: 40.9 % (ref 36–46)
HGB BLD-MCNC: 13.7 G/DL (ref 12–16)
IMM GRANULOCYTES # BLD AUTO: 0.01 X10*3/UL (ref 0–0.7)
IMM GRANULOCYTES NFR BLD AUTO: 0.2 % (ref 0–0.9)
LYMPHOCYTES # BLD AUTO: 1.24 X10*3/UL (ref 1.2–4.8)
LYMPHOCYTES NFR BLD AUTO: 27.5 %
MCH RBC QN AUTO: 31.9 PG (ref 26–34)
MCHC RBC AUTO-ENTMCNC: 33.5 G/DL (ref 32–36)
MCV RBC AUTO: 95 FL (ref 80–100)
MONOCYTES # BLD AUTO: 0.46 X10*3/UL (ref 0.1–1)
MONOCYTES NFR BLD AUTO: 10.2 %
NEUTROPHILS # BLD AUTO: 2.69 X10*3/UL (ref 1.2–7.7)
NEUTROPHILS NFR BLD AUTO: 59.6 %
NRBC BLD-RTO: 0 /100 WBCS (ref 0–0)
PLATELET # BLD AUTO: 170 X10*3/UL (ref 150–450)
POTASSIUM SERPL-SCNC: 4.4 MMOL/L (ref 3.5–5.3)
PROT SERPL-MCNC: 6.6 G/DL (ref 6.4–8.2)
RBC # BLD AUTO: 4.3 X10*6/UL (ref 4–5.2)
SODIUM SERPL-SCNC: 142 MMOL/L (ref 136–145)
WBC # BLD AUTO: 4.5 X10*3/UL (ref 4.4–11.3)

## 2025-03-24 PROCEDURE — 3079F DIAST BP 80-89 MM HG: CPT | Performed by: NURSE PRACTITIONER

## 2025-03-24 PROCEDURE — 1126F AMNT PAIN NOTED NONE PRSNT: CPT | Performed by: NURSE PRACTITIONER

## 2025-03-24 PROCEDURE — 99214 OFFICE O/P EST MOD 30 MIN: CPT | Performed by: NURSE PRACTITIONER

## 2025-03-24 PROCEDURE — 85025 COMPLETE CBC W/AUTO DIFF WBC: CPT | Performed by: NURSE PRACTITIONER

## 2025-03-24 PROCEDURE — 1159F MED LIST DOCD IN RCRD: CPT | Performed by: NURSE PRACTITIONER

## 2025-03-24 PROCEDURE — 3074F SYST BP LT 130 MM HG: CPT | Performed by: NURSE PRACTITIONER

## 2025-03-24 PROCEDURE — 86300 IMMUNOASSAY TUMOR CA 15-3: CPT | Performed by: NURSE PRACTITIONER

## 2025-03-24 PROCEDURE — 80053 COMPREHEN METABOLIC PANEL: CPT | Performed by: NURSE PRACTITIONER

## 2025-03-24 ASSESSMENT — PAIN SCALES - GENERAL: PAINLEVEL_OUTOF10: 0-NO PAIN

## 2025-03-24 NOTE — PROGRESS NOTES
Patient here for follow up visit with Stefani Zazueta for Dx of breast cancer   Patient here alone    Medications and Allergies reviewed and reconciled this visit.    No concerns or complaints noted at this time.     Pt reports appetite is good.   Dizziness: denies   Bleeding: denies   PICA: denies   Fevers/Chills/weight loss/night sweats: denies .  Pt stopped smoking.    Pt is not doing breast checks.         Follow up per  PA  request.    Pt. reports availability and use of mychart, Reviewed this is a good place to communicate with the team as well as review labs and upcoming orders.  No barriers to education noted, patient agrees to current plan and verbalized understanding using teach back method.

## 2025-03-24 NOTE — PROGRESS NOTES
Patient ID: Gavi Tejeda is a 64 y.o. female.  Referring Physician: Juli Li PA-C  [id number] [place] [address]  Primary Care Provider: Ashley Bradley MD  Visit Type: Follow Up      Subjective    HPI     2003 patient was diagnosed with a T2N2 invasive ductal carcinoma with 3 out of 21 lymph nodes involved.  She received neoadjuvant chemotherapy with 6 cycles of Adriamycin 5-FU and Cytoxan.  Patient proceeded to have bilateral mastectomies.  Her primary tumor was in the left breast in 2004.  A residual 1 cm invasive ductal carcinoma was removed from the left breast to 3 out of 21 lymph nodes were involved.  Patient eventually had completed healing in construction with a TRAM flap in the left breast.  She has finished 5 years of Aromasin therapy.     Record indicates that CT scan of the chest date of visit  in May showed nonspecific lung nodules.  MRI of the brain revealed patchy confluent areas of abnormal signal with possible primary demyelinating process seen.  Neurology was consulted and she has followed with neurology in 2018 since.  Her boyfriend  of metastatic disease to the brain and patient continues to smoke.     She has a meningioma in the head which is being followed.  Her last colonoscopy was .  Genetic testing reveals MUTYH gene and also a gene of uncertain significance in the JASON gene.   She is being monitored by GI.   She quit smoking.     She is feeling well at this time without complaints.      Review of Systems - Oncology Asymptomatic    Objective   BSA: 2.08 meters squared  /84   Pulse 72   Temp 35.8 °C (96.4 °F) (Temporal)   Resp 18   Wt 94.6 kg (208 lb 7.1 oz)   LMP  (LMP Unknown)   SpO2 98%   BMI 34.69 kg/m²      has a past medical history of Bariatric surgery status (2017), Breast cancer (Multi), Encounter for preprocedural laboratory examination (2019), Encounter for screening for malignant neoplasm of colon (2018),  Hyperlipidemia, Hypertension, Morbid (severe) obesity due to excess calories (Multi) (04/11/2017), Nausea with vomiting, unspecified (03/03/2017), Nausea with vomiting, unspecified (11/11/2016), Other acute postprocedural pain (04/11/2017), Other conditions influencing health status, Personal history of nicotine dependence (05/07/2018), Stroke (Multi), and Vision loss.   has a past surgical history that includes Knee surgery (04/24/2013); Gallbladder surgery (04/24/2013); Other surgical history (01/09/2017); Other surgical history (02/29/2016); Mastectomy (05/24/2013); MR angio head wo IV contrast (09/19/2019); Mastectomy, radical (Left); Cataract extraction, extracapsular w/ intraocular lens implant (Bilateral); LASIK; and Cardiac catheterization (N/A, 7/19/2024).  No family history on file.      Gavi Tejeda  reports that she has been smoking cigarettes. She started smoking about 45 years ago. She has a 45.2 pack-year smoking history. She has been exposed to tobacco smoke. She has never used smokeless tobacco.  She  reports that she does not currently use alcohol.  She  reports no history of drug use.    Physical Exam  Constitutional:       Appearance: She is obese.   Eyes:      Pupils: Pupils are equal, round, and reactive to light.   Cardiovascular:      Rate and Rhythm: Normal rate and regular rhythm.      Pulses: Normal pulses.      Heart sounds: Normal heart sounds.   Pulmonary:      Effort: No respiratory distress.      Breath sounds: No rhonchi or rales.   Abdominal:      General: There is no distension.      Tenderness: There is no abdominal tenderness.   Musculoskeletal:         General: No swelling or tenderness. Normal range of motion.   Lymphadenopathy:      Cervical: No cervical adenopathy.   Skin:     Coloration: Skin is not jaundiced or pale.      Findings: No bruising.   Neurological:      Motor: No weakness.     WBC   Date/Time Value Ref Range Status   03/24/2025 12:10 PM 4.5 4.4 - 11.3  x10*3/uL Final   07/16/2024 08:49 AM 4.2 (L) 4.4 - 11.3 x10*3/uL Final   05/02/2024 07:32 AM 8.1 4.4 - 11.3 x10*3/uL Final     nRBC   Date Value Ref Range Status   03/24/2025 0.0 0.0 - 0.0 /100 WBCs Final   07/16/2024 0.0 0.0 - 0.0 /100 WBCs Final   05/02/2024 0.0 0.0 - 0.0 /100 WBCs Final     RBC   Date Value Ref Range Status   03/24/2025 4.30 4.00 - 5.20 x10*6/uL Final   07/16/2024 4.48 4.00 - 5.20 x10*6/uL Final   05/02/2024 3.80 (L) 4.00 - 5.20 x10*6/uL Final     Hemoglobin   Date Value Ref Range Status   03/24/2025 13.7 12.0 - 16.0 g/dL Final   07/16/2024 14.0 12.0 - 16.0 g/dL Final   05/02/2024 12.1 12.0 - 16.0 g/dL Final     Hematocrit   Date Value Ref Range Status   03/24/2025 40.9 36.0 - 46.0 % Final   07/16/2024 42.0 36.0 - 46.0 % Final   05/02/2024 35.1 (L) 36.0 - 46.0 % Final     MCV   Date/Time Value Ref Range Status   03/24/2025 12:10 PM 95 80 - 100 fL Final   07/16/2024 08:49 AM 94 80 - 100 fL Final   05/02/2024 07:32 AM 92 80 - 100 fL Final     MCH   Date/Time Value Ref Range Status   03/24/2025 12:10 PM 31.9 26.0 - 34.0 pg Final   07/16/2024 08:49 AM 31.3 26.0 - 34.0 pg Final   05/02/2024 07:32 AM 31.8 26.0 - 34.0 pg Final     MCHC   Date/Time Value Ref Range Status   03/24/2025 12:10 PM 33.5 32.0 - 36.0 g/dL Final   07/16/2024 08:49 AM 33.3 32.0 - 36.0 g/dL Final   05/02/2024 07:32 AM 34.5 32.0 - 36.0 g/dL Final     RDW   Date/Time Value Ref Range Status   03/24/2025 12:10 PM 12.7 11.5 - 14.5 % Final   07/16/2024 08:49 AM 12.4 11.5 - 14.5 % Final   05/02/2024 07:32 AM 12.5 11.5 - 14.5 % Final     Platelets   Date/Time Value Ref Range Status   03/24/2025 12:10  150 - 450 x10*3/uL Final   07/16/2024 08:49  150 - 450 x10*3/uL Final   05/02/2024 07:32  150 - 450 x10*3/uL Final     MPV   Date/Time Value Ref Range Status   09/28/2022 09:44 AM 9.8 7.0 - 12.6 CU Final   08/30/2022 09:07 AM 10.8 7.0 - 12.6 CU Final   08/15/2021 04:08 PM 9.7 7.0 - 12.6 CU Final     Neutrophils %   Date/Time  Value Ref Range Status   03/24/2025 12:10 PM 59.6 40.0 - 80.0 % Final   04/30/2024 10:43 PM 65.8 40.0 - 80.0 % Final   04/22/2024 10:54 AM 63.2 40.0 - 80.0 % Final     Immature Granulocytes %, Automated   Date/Time Value Ref Range Status   03/24/2025 12:10 PM 0.2 0.0 - 0.9 % Final     Comment:     Immature Granulocyte Count (IG) includes promyelocytes, myelocytes and metamyelocytes but does not include bands. Percent differential counts (%) should be interpreted in the context of the absolute cell counts (cells/UL).   04/30/2024 10:43 PM 0.2 0.0 - 0.9 % Final     Comment:     Immature Granulocyte Count (IG) includes promyelocytes, myelocytes and metamyelocytes but does not include bands. Percent differential counts (%) should be interpreted in the context of the absolute cell counts (cells/UL).   04/22/2024 10:54 AM 0.2 0.0 - 0.9 % Final     Comment:     Immature Granulocyte Count (IG) includes promyelocytes, myelocytes and metamyelocytes but does not include bands. Percent differential counts (%) should be interpreted in the context of the absolute cell counts (cells/UL).     Lymphocytes %   Date/Time Value Ref Range Status   03/24/2025 12:10 PM 27.5 13.0 - 44.0 % Final   04/30/2024 10:43 PM 25.0 13.0 - 44.0 % Final   04/22/2024 10:54 AM 25.1 13.0 - 44.0 % Final     Monocytes %   Date/Time Value Ref Range Status   03/24/2025 12:10 PM 10.2 2.0 - 10.0 % Final   04/30/2024 10:43 PM 6.8 2.0 - 10.0 % Final   04/22/2024 10:54 AM 7.8 2.0 - 10.0 % Final     Eosinophils %   Date/Time Value Ref Range Status   03/24/2025 12:10 PM 1.6 0.0 - 6.0 % Final   04/30/2024 10:43 PM 1.5 0.0 - 6.0 % Final   04/22/2024 10:54 AM 2.6 0.0 - 6.0 % Final     Basophils %   Date/Time Value Ref Range Status   03/24/2025 12:10 PM 0.9 0.0 - 2.0 % Final   04/30/2024 10:43 PM 0.7 0.0 - 2.0 % Final   04/22/2024 10:54 AM 1.1 0.0 - 2.0 % Final     Neutrophils Absolute   Date/Time Value Ref Range Status   03/24/2025 12:10 PM 2.69 1.20 - 7.70 x10*3/uL  Final     Comment:     Percent differential counts (%) should be interpreted in the context of the absolute cell counts (cells/uL).   04/30/2024 10:43 PM 3.85 1.20 - 7.70 x10*3/uL Final     Comment:     Percent differential counts (%) should be interpreted in the context of the absolute cell counts (cells/uL).   04/22/2024 10:54 AM 2.93 1.20 - 7.70 x10*3/uL Final     Comment:     Percent differential counts (%) should be interpreted in the context of the absolute cell counts (cells/uL).     Immature Granulocytes Absolute, Automated   Date/Time Value Ref Range Status   03/24/2025 12:10 PM 0.01 0.00 - 0.70 x10*3/uL Final   04/30/2024 10:43 PM 0.01 0.00 - 0.70 x10*3/uL Final   04/22/2024 10:54 AM 0.01 0.00 - 0.70 x10*3/uL Final     Lymphocytes Absolute   Date/Time Value Ref Range Status   03/24/2025 12:10 PM 1.24 1.20 - 4.80 x10*3/uL Final   04/30/2024 10:43 PM 1.46 1.20 - 4.80 x10*3/uL Final   04/22/2024 10:54 AM 1.16 (L) 1.20 - 4.80 x10*3/uL Final     Monocytes Absolute   Date/Time Value Ref Range Status   03/24/2025 12:10 PM 0.46 0.10 - 1.00 x10*3/uL Final   04/30/2024 10:43 PM 0.40 0.10 - 1.00 x10*3/uL Final   04/22/2024 10:54 AM 0.36 0.10 - 1.00 x10*3/uL Final     Eosinophils Absolute   Date/Time Value Ref Range Status   03/24/2025 12:10 PM 0.07 0.00 - 0.70 x10*3/uL Final   04/30/2024 10:43 PM 0.09 0.00 - 0.70 x10*3/uL Final   04/22/2024 10:54 AM 0.12 0.00 - 0.70 x10*3/uL Final     Basophils Absolute   Date/Time Value Ref Range Status   03/24/2025 12:10 PM 0.04 0.00 - 0.10 x10*3/uL Final   04/30/2024 10:43 PM 0.04 0.00 - 0.10 x10*3/uL Final   04/22/2024 10:54 AM 0.05 0.00 - 0.10 x10*3/uL Final     aPTT   Date/Time Value Ref Range Status   09/19/2019 08:09 PM 22.3 22.0 - 32.5 SEC Final     Comment:     Performed at [address] [address]   04/18/2018 09:41 AM 24.3 22.0 - 32.5 SEC Final     Comment:     Performed at [address] [address]       Assessment/Plan     62-year-old female diagnosed 2003 T2N2 invasive ductal carcinoma: Treated with neoadjuvant chemotherapy 6 cycles of Adriamycin 5-FU and Cytoxan followed by bilateral mastectomy.  Completed 5 years of Aromasin therapy.  Has since not had any evidence of disease recurrence.      Patient can follow routine care with PCP at this time       Patient verbalized understanding, and all her questions were answered to her satisfaction     30 min spent with patient greater th     Diagnoses and all orders for this visit:  Malignant neoplasm of nipple of right breast in female, unspecified estrogen receptor status  -     CBC and Auto Differential; Future  -     Comprehensive Metabolic Panel; Future  -     Cancer Antigen 27-29; Future  Malignant neoplasm of left breast in female, estrogen receptor positive, unspecified site of breast  -     Clinic Appointment Request CAMILLA GALLOWAY PA-C

## 2025-03-25 ENCOUNTER — APPOINTMENT (OUTPATIENT)
Dept: HEMATOLOGY/ONCOLOGY | Facility: CLINIC | Age: 65
End: 2025-03-25
Payer: COMMERCIAL

## 2025-04-22 ENCOUNTER — OFFICE VISIT (OUTPATIENT)
Dept: CARDIOLOGY | Facility: HOSPITAL | Age: 65
End: 2025-04-22
Payer: MEDICARE

## 2025-04-22 VITALS
BODY MASS INDEX: 35.33 KG/M2 | WEIGHT: 212.3 LBS | OXYGEN SATURATION: 96 % | HEART RATE: 70 BPM | DIASTOLIC BLOOD PRESSURE: 82 MMHG | SYSTOLIC BLOOD PRESSURE: 138 MMHG

## 2025-04-22 DIAGNOSIS — I10 HYPERTENSION, UNSPECIFIED TYPE: Primary | ICD-10-CM

## 2025-04-22 DIAGNOSIS — R55 SYNCOPE AND COLLAPSE: ICD-10-CM

## 2025-04-22 DIAGNOSIS — R42 DIZZINESS: ICD-10-CM

## 2025-04-22 DIAGNOSIS — I42.8 NICM (NONISCHEMIC CARDIOMYOPATHY) (MULTI): ICD-10-CM

## 2025-04-22 LAB
ATRIAL RATE: 65 BPM
P AXIS: 14 DEGREES
P OFFSET: 206 MS
P ONSET: 155 MS
PR INTERVAL: 134 MS
Q ONSET: 222 MS
QRS COUNT: 11 BEATS
QRS DURATION: 84 MS
QT INTERVAL: 392 MS
QTC CALCULATION(BAZETT): 407 MS
QTC FREDERICIA: 402 MS
R AXIS: -15 DEGREES
T AXIS: -56 DEGREES
T OFFSET: 418 MS
VENTRICULAR RATE: 65 BPM

## 2025-04-22 PROCEDURE — 99214 OFFICE O/P EST MOD 30 MIN: CPT | Performed by: STUDENT IN AN ORGANIZED HEALTH CARE EDUCATION/TRAINING PROGRAM

## 2025-04-22 PROCEDURE — G2211 COMPLEX E/M VISIT ADD ON: HCPCS | Performed by: STUDENT IN AN ORGANIZED HEALTH CARE EDUCATION/TRAINING PROGRAM

## 2025-04-22 PROCEDURE — 3079F DIAST BP 80-89 MM HG: CPT | Performed by: STUDENT IN AN ORGANIZED HEALTH CARE EDUCATION/TRAINING PROGRAM

## 2025-04-22 PROCEDURE — 1159F MED LIST DOCD IN RCRD: CPT | Performed by: STUDENT IN AN ORGANIZED HEALTH CARE EDUCATION/TRAINING PROGRAM

## 2025-04-22 PROCEDURE — 3075F SYST BP GE 130 - 139MM HG: CPT | Performed by: STUDENT IN AN ORGANIZED HEALTH CARE EDUCATION/TRAINING PROGRAM

## 2025-04-22 PROCEDURE — 93005 ELECTROCARDIOGRAM TRACING: CPT | Performed by: STUDENT IN AN ORGANIZED HEALTH CARE EDUCATION/TRAINING PROGRAM

## 2025-04-22 NOTE — PROGRESS NOTES
Primary Care Physician:    Date of Visit: 04/22/2025  1:00 PM EDT  Type of Visit: follow up      Chief Complaint:  Chief Complaint   Patient presents with    Hypertension        HPI  Gavi Tejeda 64 y.o. female with hx of NICM, based on cath done 2007, LVEF 30% and last reported LVEF 2020 was 40-45%, normal stress nuc 2018, TTE 4/2024 with LVEF 35-40%, LHC done with mild non-obstructive disease with normal LVED and no intervention done, HTN, COPD, MERRY, obesity s/p bariatric surgery/sleeve, smoker, TIA, cancer breast treated with neoadjuvant chemotherapy 6 cycles of Adriamycin 5-FU and Cytoxan followed by bilateral mastectomy, then finished 5 years Aromasin therapy     She feels good   She remains active  No sob or angina  No escobar   Trace le edema   Intermittent vertigo, she continues to get presyncopal events   No bleeding   But she has bruising  She quit smoking last January cold turkey, did not use patches. Since she quit she has been eating more and she gained some weight.   She does not take coreg     Review of Systems   Review of Systems   12 points review of systems are negative expect for the above    Social History:  Social History     Socioeconomic History    Marital status: Single     Spouse name: Not on file    Number of children: Not on file    Years of education: Not on file    Highest education level: Not on file   Occupational History    Not on file   Tobacco Use    Smoking status: Former     Current packs/day: 1.00     Average packs/day: 1 pack/day for 45.3 years (45.3 ttl pk-yrs)     Types: Cigarettes     Start date: 1/1/1980     Passive exposure: Current    Smokeless tobacco: Never   Vaping Use    Vaping status: Never Used   Substance and Sexual Activity    Alcohol use: Not Currently     Comment: SOCIALLY    Drug use: Never    Sexual activity: Defer   Other Topics Concern    Not on file   Social History Narrative    ** Merged History Encounter **          Social Drivers of Health     Financial  Resource Strain: Low Risk  (5/1/2024)    Overall Financial Resource Strain (CARDIA)     Difficulty of Paying Living Expenses: Not hard at all   Food Insecurity: Not on file   Transportation Needs: No Transportation Needs (5/1/2024)    PRAPARE - Transportation     Lack of Transportation (Medical): No     Lack of Transportation (Non-Medical): No   Physical Activity: Not on file   Stress: Not on file   Social Connections: Not on file   Intimate Partner Violence: Not on file   Housing Stability: Low Risk  (5/1/2024)    Housing Stability Vital Sign     Unable to Pay for Housing in the Last Year: No     Number of Places Lived in the Last Year: 1     Unstable Housing in the Last Year: No        Past Medical History:  Medical History[1]    Past Surgical History:  Surgical History[2]    Family History:  Family History[3]     Objective:       7/19/2024    12:25 PM 7/19/2024    12:40 PM 7/19/2024    12:55 PM 7/19/2024     1:10 PM 10/22/2024     1:19 PM 3/24/2025    11:45 AM 4/22/2025    12:58 PM   Vitals   Systolic 153 158 145 138 125 128 138   Diastolic 82 78 74 67 82 84 82   Heart Rate 57 56 59 69 74 72 70   Temp      35.8 °C (96.4 °F)    Resp 21 13 18 16  18    Weight (lb)     197.97 208.45 212.3   BMI     32.94 kg/m2 34.69 kg/m2 35.33 kg/m2   BSA (m2)     2.03 m2 2.08 m2 2.1 m2   Visit Report     Report Report Report      Constitutional:       Appearance: Healthy appearance. Not in distress.   Neck:      Vascular:  JVD normal.   Pulmonary:      Effort: Pulmonary effort is normal.      Breath sounds: Normal breath sounds. No wheezing. No rhonchi. No rales.   Chest:      Chest wall: Not tender to palpatation.   Cardiovascular:      Normal rate. Regular rhythm. Normal S1. Normal S2.       Murmurs: There is no murmur.      No gallop.  N No rub.   Pulses:     Intact distal pulses.   Edema:     Peripheral edema absent.   Abdominal:      General: Bowel sounds are normal.      Palpations: Abdomen is soft.      Tenderness: There is  no abdominal tenderness.   Musculoskeletal: Normal range of motion.         General: No tenderness.   Skin:     General: Skin is warm and dry.   Neurological:      General: No focal deficit present.      Mental Status: Alert and oriented to person, place and time.   Psychiatry:     Normal Mood     Allergies:  Allergies[4]    Medications:  Current Outpatient Medications   Medication Instructions    atorvastatin (LIPITOR) 40 mg, oral, Daily    carvedilol (COREG) 3.125 mg, oral, 2 times daily (morning and late afternoon)    empagliflozin (JARDIANCE) 10 mg, oral, Daily    meclizine (ANTIVERT) 25 mg, oral, 3 times daily PRN    nicotine (Nicoderm CQ) 21 mg/24 hr patch 1 patch, transdermal, Every 24 hours    sacubitriL-valsartan (Entresto) 24-26 mg tablet 1 tablet, oral, 2 times daily        Labs and Imaging:     I reviewed the following labs  Lab Results   Component Value Date    WBC 4.5 03/24/2025    HGB 13.7 03/24/2025    HCT 40.9 03/24/2025     03/24/2025    CHOL 171 04/22/2024    TRIG 91 04/22/2024    HDL 56.1 04/22/2024    ALT 13 03/24/2025    AST 19 03/24/2025     03/24/2025    K 4.4 03/24/2025     (H) 03/24/2025    CREATININE 0.64 03/24/2025    BUN 12 03/24/2025    CO2 26 03/24/2025    TSH 1.73 04/22/2024    INR 1.0 04/30/2024    HGBA1C 5.0 08/03/2023       I reviewed the following:  EKG:   Recent Labs     04/22/25  1259 04/10/24  1326 06/04/19  0914   ATRRATE 65 74 75   VENTRATE 65 74 75   PRINT 134 128 134   QRSDUR 84 86 84   QTCFRED 402 401 402   QTCCALCB 407 415 417     Encounter Date: 04/22/25   ECG 12 lead (Clinic Performed)   Result Value    Ventricular Rate 65    Atrial Rate 65    WV Interval 134    QRS Duration 84    QT Interval 392    QTC Calculation(Bazett) 407    P Axis 14    R Axis -15    T Axis -56    QRS Count 11    Q Onset 222    P Onset 155    P Offset 206    T Offset 418    QTC Fredericia 402    Narrative    Normal sinus rhythm  Voltage criteria for left ventricular  hypertrophy  Nonspecific ST and T wave abnormality  Abnormal ECG  When compared with ECG of 10-APR-2024 13:34,  T wave inversion no longer evident in Lateral leads     Echocardiogram:   Recent Labs     06/07/24  1339   LVIDD 6.02   RV 17.0   RVFRWALLPKSP 10.40   TAPSE 1.8   Transthoracic Echo (TTE) Complete 06/07/2024    [facility]  [address] [facility], [place] [id number]  Phone [phone]    TRANSTHORACIC ECHOCARDIOGRAM REPORT      Patient Name:      PADILLA Zelaya Physician:    [id number] Jim Richter MD  Study Date:        6/7/2024             Ordering Provider:    [id number] VIOLA PENALOZA  MRN/PID:           [id number]             Fellow:  Accession#:        [id number]         Nurse:  Date of Birth/Age: 1960 / 63      Sonographer:          Marlene Sheridan [facility],  RD, [facility]  Gender:            F                    Additional Staff:  Height:            165.10 cm            Admit Date:  Weight:            91.63 kg             Admission Status:     Outpatient  BSA / BMI:         1.99 m2 / 33.61      Department Location:  kg/m2  Blood Pressure: 132 /74 mmHg    Study Type:    TRANSTHORACIC ECHO (TTE) COMPLETE  Diagnosis/ICD: Heart failure, unspecified-I50.9  Indication:    Heart failure  CPT Codes:     Echo Complete w Full Doppler-00008    Patient History:  Pertinent History: TIA, HTN, CVA, HLD, NICM, COPD, Abnormal ekg.    Study Detail: The following Echo studies were performed: 2D, M-Mode, Doppler and  color flow.      PHYSICIAN INTERPRETATION:  Left Ventricle: Left ventricular systolic function is moderately decreased, with an estimated ejection fraction of 35-40%. The calculated ejection fraction is moderately decreased at 41 % using the Meehan's Bi-plane MOD calculation. The calculated ejection fraction is moderately decreased at 44 % using 3D evaluation. There is global hypokinesis of the left ventricle with minor  regional variations. The left ventricular cavity size is moderately dilated. Left Ventricular Global Longitudinal Strain - -13.3 %. Spectral Doppler shows an impaired relaxation pattern of left ventricular diastolic filling.  Left Atrium: The left atrium is mildly dilated.  Right Ventricle: The right ventricle is normal in size. There is normal right ventriclar wall thickness. There is normal right ventricular global systolic function.  Right Atrium: The right atrium is normal in size.  Aortic Valve: The aortic valve appears structurally normal. The aortic valve appears tricuspid and non-restricted. There is no evidence of aortic valve regurgitation. The peak instantaneous gradient of the aortic valve is 8.2 mmHg.  Mitral Valve: The mitral valve is normal in structure. There is normal mitral valve leaflet mobility. There is mild mitral valve regurgitation.  Tricuspid Valve: The tricuspid valve is structurally normal. There is normal tricuspid valve leaflet mobility. There is trace tricuspid regurgitation.  Pulmonic Valve: The pulmonic valve is structurally normal. There is trace pulmonic valve regurgitation.  Pericardium: There is no pericardial effusion noted. There is a pericardial fat pad present.  Aorta: The aortic root is normal. There is no dilatation of the aortic arch. There is no dilatation of the ascending aorta. There is no dilatation of the aortic root.  Pulmonary Artery: The pulmonary artery is normal in size. The tricuspid regurgitant velocity is 1.87 m/s, and with an estimated right atrial pressure of 3 mmHg, the estimated pulmonary artery pressure is normal with the RVSP at 17.0 mmHg. The estimated PASP is 17 mmHg.  Systemic Veins: The inferior vena cava appears to be of normal size. There is IVC inspiratory collapse greater than 50%.      CONCLUSIONS:  1. Left ventricular systolic function is moderately decreased with a 35-40% estimated ejection fraction.  2. Spectral Doppler shows an impaired  relaxation pattern of left ventricular diastolic filling.  3. Mild mitral valve regurgitation.  4. Left ventricular cavity size is moderately dilated.  5. There is global hypokinesis of the left ventricle with minor regional variations.    QUANTITATIVE DATA SUMMARY:  2D MEASUREMENTS:  Normal Ranges:  IVSd:          0.68 cm   (0.6-1.1cm)  LVPWd:         0.90 cm   (0.6-1.1cm)  LVIDd:         6.02 cm   (3.9-5.9cm)  LVIDs:         4.41 cm  LV Mass Index: 92.9 g/m2  LV % FS        26.7 %    LA VOLUME:  Normal Ranges:  LA Vol A4C:        49.8 ml    (22+/-6mL/m2)  LA Vol A2C:        69.5 ml  LA Vol BP:         59.1 ml  LA Vol Index A4C:  25.1ml/m2  LA Vol Index A2C:  35.0 ml/m2  LA Vol Index BP:   29.8 ml/m2  LA Area A4C:       17.6 cm2  LA Area A2C:       20.9 cm2  LA Major Axis A4C: 5.3 cm  LA Major Axis A2C: 5.4 cm  LA Volume Index:   30.1 ml/m2  LA Vol A4C:        47.4 ml  LA Vol A2C:        66.9 ml    RA VOLUME BY A/L METHOD:  Normal Ranges:  RA Area A4C: 15.8 cm2    AORTA MEASUREMENTS:  Normal Ranges:  Ao Sinus, d: 2.70 cm (2.1-3.5cm)  Asc Ao, d:   3.20 cm (2.1-3.4cm)    LV SYSTOLIC FUNCTION BY 2D PLANIMETRY (MOD):  Normal Ranges:  EF-A4C View:                      42.2 %  (>=55%)  EF-A2C View:                      42.2 %  EF-Biplane:                       40.6 %  Global Longitudinal Strain (GLS): -13.3 %    LV DIASTOLIC FUNCTION:  Normal Ranges:  MV Peak E:    0.52 m/s (0.7-1.2 m/s)  MV Peak A:    0.86 m/s (0.42-0.7 m/s)  E/A Ratio:    0.60     (1.0-2.2)  MV e'         0.04 m/s (>8.0)  MV lateral e' 0.04 m/s  MV medial e'  0.04 m/s  E/e' Ratio:   11.76    (<8.0)    MITRAL VALVE:  Normal Ranges:  MV DT: 373 msec (150-240msec)    AORTIC VALVE:  Normal Ranges:  AoV Vmax:      1.43 m/s (<=1.7m/s)  AoV Peak P.2 mmHg (<20mmHg)  LVOT Max Eduardo:  0.62 m/s (<=1.1m/s)  LVOT VTI:      16.30 cm  LVOT Diameter: 2.00 cm  (1.8-2.4cm)  AoV Area,Vmax: 1.36 cm2 (2.5-4.5cm2)      RIGHT VENTRICLE:  RV Basal 2.80 cm  TAPSE:   17.5  mm  RV s'    0.10 m/s    TRICUSPID VALVE/RVSP:  Normal Ranges:  Peak TR Velocity: 1.87 m/s  RV Syst Pressure: 17.0 mmHg (< 30mmHg)  IVC Diam:         1.11 cm      [id number] Jim Richter MD  Electronically signed on 6/7/2024 at 5:52:15 PM        ** Final **    Coronary Angiography:   Left Heart Cath 07/19/2024    [facility], Cath Lab, [address] [place] [address]    Cardiovascular Catheterization Report    Patient Name:      PADILLA COKER      Performing Physician:  [id number] Anthony Curry MD  Study Date:        7/19/2024             Verifying Physician:   Kristen Curry MD  MRN/PID:           [id number]              Cardiologist/Co-Scrub:  Accession#:        [id number]          Ordering Provider:     [id number] VIOLA PENALOZA  Date of Birth/Age: 1960 / 63 years Cardiologist:  Gender:            F                     Fellow:  Encounter#:        [id number]            Surgeon:      Study: Left Heart Cath      Indications:  PADILLA COKER is a 64 year old female who presents with hypertension, chronic pulmonary disease, cerebrovascular accident and a chest pain assessment of atypical angina. Left ventricular dysfunction, cardiomyopathy and worsening angina.    Procedure Description:  After infiltration with 2% Lidocaine, the right radial artery was cannulated with a modified Seldinger technique. Subsequently a 6 [place]an sheath was placed in the right radial artery. Selective coronary catheterization was performed using a 5 Fr catheter(s) exchanged over a guide wire to cannulate the coronary arteries.  Multiple injections of contrast were made into the left and right coronary arteries with angiograms recorded in multiple projections. After completion of the procedure, the arterial sheath was pulled and a TR Band Radial Compression Device was utilized to obtain patent hemostasis.    Coronary Angiography:  The coronary circulation is right dominant.    Left Main Coronary  Artery:  The left main coronary artery showed no significant disease or stenosis greater than 30%.    Left Anterior Descending Coronary Artery Distribution:  The distal left anterior descending coronary artery showed 40% stenosis.    Circumflex Coronary Artery Distribution:  The circumflex revealed no significant disease or stenosis greater than 30%. The proximal 1st obtuse marginal branch showed 40% stenosis.    Right Coronary Artery Distribution:    The RCA showed no significant disease or stenosis greater than 30%.    Coronary Lesion Summary:  Vessel   Stenosis   Vessel Segment  LAD    40% stenosis     distal  OM 1   40% stenosis    proximal      Hemo Personnel:  +-------------------+---------+  Name               Duty       +-------------------+---------+  Anthony Curry MD 1  +-------------------+---------+      Hemodynamic Pressures:    +----+---------------+----------+-------------+--------------+-------+---------+  Site   Date Time   Phase NameSystolic mmHgDiastolic mmHgED mmHgMean mmHg  +----+---------------+----------+-------------+--------------+-------+---------+    LV      7/19/2024  AIR REST          113            12     13                   10:52:00 AM                                                       +----+---------------+----------+-------------+--------------+-------+---------+    LV      7/19/2024  AIR REST          111            11     12                   10:52:08 AM                                                       +----+---------------+----------+-------------+--------------+-------+---------+   LVp      7/19/2024  AIR REST          110             9     11                   10:52:14 AM                                                       +----+---------------+----------+-------------+--------------+-------+---------+   AOp      7/19/2024  AIR REST          117            83              99           10:52:21 AM                                                       +----+---------------+----------+-------------+--------------+-------+---------+    AO      7/19/2024  AIR REST          117            80              96          10:52:36 AM                                                       +----+---------------+----------+-------------+--------------+-------+---------+      Cardiac Cath Post Procedure Notes:  Post Procedure Diagnosis: Mild nonobstructive disease.  Blood Loss:               Estimated blood loss during the procedure was 5cc mls.  Specimens Removed:        Number of specimen(s) removed: none.      Recommendations:  Maximize medical therapy.    ____________________________________________________________________________________  CONCLUSIONS:  1. Left main: no significant angiographic disease.  2. LAD: 40% diffuse distal disease.  3. LCx: 40% OM1 proximal stenosis.  4. RCA: no significant angiographic disease.  5. LVEDP 12mmHg, no significant angiographic disease.    ICD 10 Codes:  Cardiomyopathy, unspecified-I42.9    CPT Codes:  Left Heart Cath (visualization of coronaries) and LV-90175; Moderate Sedation Services initial 15 minutes patient >5 years-[id number]    [id number] Anthony Curry MD  Performing Physician  Electronically signed by [id number] Anthony Curry MD on 7/21/2024 at 7:39:53 PM          ** Final **    Right Heart Cath: No results found for this or any previous visit from the past 1800 days.    Cardiac Scoring: No results found for this or any previous visit from the past 1800 days.    Cardiac MRI: No results found for this or any previous visit from the past 1800 days.    Nuclear:No results found for this or any previous visit from the past 1800 days.    Metabolic Stress: No results found for this or any previous visit from the past 1800 days.        The ASCVD Risk score (Jeanette CARSON, et al., 2019) failed to calculate for the following reasons:    Risk score cannot be  calculated because patient has a medical history suggesting prior/existing ASCVD     Assessment/Plan:   1. Hypertension, unspecified type  ECG 12 lead (Clinic Performed)    Basic metabolic panel    Referral to Nutrition Services    Basic metabolic panel    Holter Or Event Cardiac Monitor      2. NICM (nonischemic cardiomyopathy) (Multi)  Transthoracic Echo (TTE) Complete    empagliflozin (Jardiance) 10 mg tablet    Basic metabolic panel    Referral to Nutrition Services    Basic metabolic panel    Holter Or Event Cardiac Monitor      3. Dizziness  Basic metabolic panel    Referral to Nutrition Services    Basic metabolic panel    Holter Or Event Cardiac Monitor      4. Syncope and collapse  Basic metabolic panel    Referral to Nutrition Services    Basic metabolic panel    Holter Or Event Cardiac Monitor         Gavi Tejeda 64 y.o. female with hx of NICM, based on cath done 2007, LVEF 30% and last reported LVEF 2020 was 40-45%, normal stress nuc 2018, TTE 4/2024 with LVEF 35-40%, LHC done with mild non-obstructive disease with normal LVED and no intervention done, HTN, COPD, MERRY, obesity s/p bariatric surgery/sleeve, smoker, TIA, cancer breast treated with neoadjuvant chemotherapy 6 cycles of Adriamycin 5-FU and Cytoxan followed by bilateral mastectomy, then finished 5 years Aromasin therapy     Euvolemic on exam    NYHA class I    Has vertigo and intermittent presyncope, requires additional workup   She quit smoking     Plan   Ensure medication compliance    Carotid US    Continue liptor     Continue coreg    Continue Entresto      Defer ASA given bruising    Will start Jardiance and repeat BMP after 2 weeks. Will contact with results    Advised to see Nutritionist, suspect vit deficiencies and malnutrition causing her bruising    Will order an echo  Will order a zio   Follow up after testing     Time Spent: I spent 30 minutes reviewing medical testing, obtaining medical history and counselling and educating  on diagnosis and documenting clinical encounter.        ____________________________________________________________  Amish Burroughs MD   of Medicine  Senior Attending Physician   Division of Cardiovascular Medicine   [facility]         [1]   Past Medical History:  Diagnosis Date    Bariatric surgery status 01/09/2017    S/P bariatric surgery    Breast cancer     Encounter for preprocedural laboratory examination 04/23/2019    Blood tests prior to treatment or procedure    Encounter for screening for malignant neoplasm of colon 05/07/2018    Encounter for screening colonoscopy    Hyperlipidemia     Hypertension     Morbid (severe) obesity due to excess calories (Multi) 04/11/2017    Morbid obesity with BMI of 45.0-49.9, adult    Nausea with vomiting, unspecified 03/03/2017    Post-operative nausea and vomiting    Nausea with vomiting, unspecified 11/11/2016    Post-operative nausea and vomiting    Other acute postprocedural pain 04/11/2017    Acute post-operative pain    Other conditions influencing health status     Breast Cancer    Personal history of nicotine dependence 05/07/2018    History of nicotine dependence    Stroke (Multi)     Vision loss    [2]   Past Surgical History:  Procedure Laterality Date    CARDIAC CATHETERIZATION N/A 7/19/2024    Procedure: Left Heart Cath;  Surgeon: Anthony Curry MD;  Location: [facility] Cardiac Cath Lab;  Service: Cardiovascular;  Laterality: N/A;    CATARACT EXTRACTION EXTRACAPSULAR W/ INTRAOCULAR LENS IMPLANTATION Bilateral     GALLBLADDER SURGERY  04/24/2013    Gallbladder Surgery    KNEE SURGERY  04/24/2013    Knee Surgery Left    LASIK      MASTECTOMY  05/24/2013    Breast Surgery Mastectomy    MASTECTOMY, RADICAL Left     MR HEAD ANGIO WO IV CONTRAST  09/19/2019    MR HEAD ANGIO WO IV CONTRAST [facility]    OTHER SURGICAL HISTORY  01/09/2017    Gastric Surgery For Morbid Obesity Laparoscopic  Longitudinal Gastrectomy    OTHER SURGICAL HISTORY  02/29/2016    Breast Surgery Reconstruction   [3] No family history on file.  [4]   Allergies  Allergen Reactions    Amoxicillin Swelling    Diph,Pertuss(Acel),Tet Vac(Pf) Swelling    Tetanus Immune Globulin Swelling     severe to arm where injections was given    Tetanus And Diphther. Tox (Pf) Rash

## 2025-04-25 ENCOUNTER — OFFICE VISIT (OUTPATIENT)
Dept: PRIMARY CARE | Facility: CLINIC | Age: 65
End: 2025-04-25
Payer: MEDICARE

## 2025-04-25 ENCOUNTER — APPOINTMENT (OUTPATIENT)
Dept: RADIOLOGY | Facility: CLINIC | Age: 65
End: 2025-04-25
Payer: MEDICARE

## 2025-04-25 ENCOUNTER — HOSPITAL ENCOUNTER (OUTPATIENT)
Dept: RADIOLOGY | Facility: HOSPITAL | Age: 65
Discharge: HOME | End: 2025-04-25
Payer: MEDICARE

## 2025-04-25 VITALS
DIASTOLIC BLOOD PRESSURE: 72 MMHG | SYSTOLIC BLOOD PRESSURE: 136 MMHG | HEIGHT: 65 IN | BODY MASS INDEX: 35.16 KG/M2 | WEIGHT: 211 LBS

## 2025-04-25 DIAGNOSIS — E78.00 HYPERCHOLESTEREMIA: ICD-10-CM

## 2025-04-25 DIAGNOSIS — R91.1 LUNG NODULE: ICD-10-CM

## 2025-04-25 DIAGNOSIS — Z12.31 ENCOUNTER FOR SCREENING MAMMOGRAM FOR BREAST CANCER: ICD-10-CM

## 2025-04-25 DIAGNOSIS — I10 PRIMARY HYPERTENSION: ICD-10-CM

## 2025-04-25 DIAGNOSIS — Z13.820 SCREENING FOR OSTEOPOROSIS: ICD-10-CM

## 2025-04-25 DIAGNOSIS — E03.9 HYPOTHYROIDISM, UNSPECIFIED TYPE: ICD-10-CM

## 2025-04-25 DIAGNOSIS — Z13.820 ENCOUNTER FOR OSTEOPOROSIS SCREENING IN ASYMPTOMATIC POSTMENOPAUSAL PATIENT: ICD-10-CM

## 2025-04-25 DIAGNOSIS — Z78.0 ENCOUNTER FOR OSTEOPOROSIS SCREENING IN ASYMPTOMATIC POSTMENOPAUSAL PATIENT: ICD-10-CM

## 2025-04-25 PROCEDURE — 1159F MED LIST DOCD IN RCRD: CPT | Performed by: INTERNAL MEDICINE

## 2025-04-25 PROCEDURE — 99214 OFFICE O/P EST MOD 30 MIN: CPT | Performed by: INTERNAL MEDICINE

## 2025-04-25 PROCEDURE — 3078F DIAST BP <80 MM HG: CPT | Performed by: INTERNAL MEDICINE

## 2025-04-25 PROCEDURE — 3008F BODY MASS INDEX DOCD: CPT | Performed by: INTERNAL MEDICINE

## 2025-04-25 PROCEDURE — 3075F SYST BP GE 130 - 139MM HG: CPT | Performed by: INTERNAL MEDICINE
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
## 2025-04-29 ENCOUNTER — APPOINTMENT (OUTPATIENT)
Dept: RADIOLOGY | Facility: CLINIC | Age: 65
End: 2025-04-29
Payer: MEDICARE

## 2025-04-29 DIAGNOSIS — Z13.820 SCREENING FOR OSTEOPOROSIS: ICD-10-CM

## 2025-04-29 DIAGNOSIS — Z78.0 ENCOUNTER FOR OSTEOPOROSIS SCREENING IN ASYMPTOMATIC POSTMENOPAUSAL PATIENT: ICD-10-CM

## 2025-04-29 DIAGNOSIS — Z13.820 ENCOUNTER FOR OSTEOPOROSIS SCREENING IN ASYMPTOMATIC POSTMENOPAUSAL PATIENT: ICD-10-CM

## 2025-04-29 PROCEDURE — 77080 DXA BONE DENSITY AXIAL: CPT

## 2025-04-30 LAB
ALBUMIN SERPL-MCNC: 4.3 G/DL (ref 3.6–5.1)
ALP SERPL-CCNC: 82 U/L (ref 37–153)
ALT SERPL-CCNC: 11 U/L (ref 6–29)
ANION GAP SERPL CALCULATED.4IONS-SCNC: 11 MMOL/L (CALC) (ref 7–17)
AST SERPL-CCNC: 17 U/L (ref 10–35)
BILIRUB SERPL-MCNC: 0.5 MG/DL (ref 0.2–1.2)
BUN SERPL-MCNC: 16 MG/DL (ref 7–25)
CALCIUM SERPL-MCNC: 8.9 MG/DL (ref 8.6–10.4)
CHLORIDE SERPL-SCNC: 108 MMOL/L (ref 98–110)
CHOLEST SERPL-MCNC: 276 MG/DL
CHOLEST/HDLC SERPL: 3.8 (CALC)
CO2 SERPL-SCNC: 23 MMOL/L (ref 20–32)
CREAT SERPL-MCNC: 0.68 MG/DL (ref 0.5–1.05)
EGFRCR SERPLBLD CKD-EPI 2021: 97 ML/MIN/1.73M2
ERYTHROCYTE [DISTWIDTH] IN BLOOD BY AUTOMATED COUNT: 12.9 % (ref 11–15)
GLUCOSE SERPL-MCNC: 89 MG/DL (ref 65–99)
HCT VFR BLD AUTO: 43.2 % (ref 35–45)
HDLC SERPL-MCNC: 72 MG/DL
HGB BLD-MCNC: 14.2 G/DL (ref 11.7–15.5)
LDLC SERPL CALC-MCNC: 183 MG/DL (CALC)
MCH RBC QN AUTO: 31 PG (ref 27–33)
MCHC RBC AUTO-ENTMCNC: 32.9 G/DL (ref 32–36)
MCV RBC AUTO: 94.3 FL (ref 80–100)
NONHDLC SERPL-MCNC: 204 MG/DL (CALC)
PLATELET # BLD AUTO: 183 THOUSAND/UL (ref 140–400)
PMV BLD REES-ECKER: 10.7 FL (ref 7.5–12.5)
POTASSIUM SERPL-SCNC: 4.3 MMOL/L (ref 3.5–5.3)
PROT SERPL-MCNC: 6.5 G/DL (ref 6.1–8.1)
RBC # BLD AUTO: 4.58 MILLION/UL (ref 3.8–5.1)
SODIUM SERPL-SCNC: 142 MMOL/L (ref 135–146)
TRIGL SERPL-MCNC: 92 MG/DL
TSH SERPL-ACNC: 1.96 MIU/L (ref 0.4–4.5)
WBC # BLD AUTO: 4.1 THOUSAND/UL (ref 3.8–10.8)

## 2025-05-12 ENCOUNTER — HOSPITAL ENCOUNTER (OUTPATIENT)
Dept: CARDIOLOGY | Facility: HOSPITAL | Age: 65
Discharge: HOME | End: 2025-05-12
Payer: MEDICARE

## 2025-05-12 DIAGNOSIS — R42 DIZZINESS: ICD-10-CM

## 2025-05-12 DIAGNOSIS — I42.8 NICM (NONISCHEMIC CARDIOMYOPATHY) (MULTI): ICD-10-CM

## 2025-05-12 PROCEDURE — 93306 TTE W/DOPPLER COMPLETE: CPT

## 2025-05-12 PROCEDURE — 93306 TTE W/DOPPLER COMPLETE: CPT | Performed by: STUDENT IN AN ORGANIZED HEALTH CARE EDUCATION/TRAINING PROGRAM

## 2025-05-12 PROCEDURE — 93246 EXT ECG>7D<15D RECORDING: CPT

## 2025-05-14 LAB
AORTIC VALVE MEAN GRADIENT: 7 MMHG
AORTIC VALVE PEAK VELOCITY: 1.71 M/S
AV PEAK GRADIENT: 12 MMHG
AVA (PEAK VEL): 2.15 CM2
AVA (VTI): 2.1 CM2
EJECTION FRACTION APICAL 4 CHAMBER: 53
EJECTION FRACTION: 48 %
LEFT ATRIUM VOLUME AREA LENGTH INDEX BSA: 20.6 ML/M2
LEFT VENTRICLE INTERNAL DIMENSION DIASTOLE: 5.31 CM (ref 3.5–6)
LEFT VENTRICULAR OUTFLOW TRACT DIAMETER: 2 CM
MITRAL VALVE E/A RATIO: 0.73
RIGHT VENTRICLE FREE WALL PEAK S': 11.2 CM/S
RIGHT VENTRICLE PEAK SYSTOLIC PRESSURE: 18.8 MMHG
TRICUSPID ANNULAR PLANE SYSTOLIC EXCURSION: 2 CM

## 2025-05-16 ENCOUNTER — APPOINTMENT (OUTPATIENT)
Dept: RADIOLOGY | Facility: HOSPITAL | Age: 65
End: 2025-05-16
Payer: MEDICARE

## 2025-05-16 ENCOUNTER — HOSPITAL ENCOUNTER (INPATIENT)
Facility: HOSPITAL | Age: 65
End: 2025-05-16
Attending: INTERNAL MEDICINE | Admitting: INTERNAL MEDICINE
Payer: MEDICARE

## 2025-05-16 ENCOUNTER — HOSPITAL ENCOUNTER (OUTPATIENT)
Facility: HOSPITAL | Age: 65
Setting detail: OBSERVATION
End: 2025-05-16
Attending: EMERGENCY MEDICINE | Admitting: INTERNAL MEDICINE
Payer: MEDICARE

## 2025-05-16 ENCOUNTER — APPOINTMENT (OUTPATIENT)
Dept: CARDIOLOGY | Facility: HOSPITAL | Age: 65
End: 2025-05-16
Payer: MEDICARE

## 2025-05-16 ENCOUNTER — OFFICE VISIT (OUTPATIENT)
Dept: PRIMARY CARE | Facility: CLINIC | Age: 65
End: 2025-05-16
Payer: MEDICARE

## 2025-05-16 VITALS
BODY MASS INDEX: 34.99 KG/M2 | HEIGHT: 65 IN | WEIGHT: 210 LBS | DIASTOLIC BLOOD PRESSURE: 80 MMHG | SYSTOLIC BLOOD PRESSURE: 142 MMHG

## 2025-05-16 DIAGNOSIS — S39.012A BACK STRAIN, INITIAL ENCOUNTER: ICD-10-CM

## 2025-05-16 DIAGNOSIS — R55 SYNCOPE, UNSPECIFIED SYNCOPE TYPE: Primary | ICD-10-CM

## 2025-05-16 DIAGNOSIS — R55 SYNCOPE, UNSPECIFIED SYNCOPE TYPE: ICD-10-CM

## 2025-05-16 DIAGNOSIS — S09.90XA INJURY OF HEAD, INITIAL ENCOUNTER: ICD-10-CM

## 2025-05-16 LAB
ALBUMIN SERPL BCP-MCNC: 4.8 G/DL (ref 3.4–5)
ALP SERPL-CCNC: 93 U/L (ref 33–136)
ALT SERPL W P-5'-P-CCNC: 13 U/L (ref 7–45)
ANION GAP SERPL CALC-SCNC: 14 MMOL/L (ref 10–20)
AST SERPL W P-5'-P-CCNC: 18 U/L (ref 9–39)
BASOPHILS # BLD AUTO: 0.04 X10*3/UL (ref 0–0.1)
BASOPHILS NFR BLD AUTO: 0.5 %
BILIRUB SERPL-MCNC: 0.4 MG/DL (ref 0–1.2)
BUN SERPL-MCNC: 16 MG/DL (ref 6–23)
CALCIUM SERPL-MCNC: 9.6 MG/DL (ref 8.6–10.3)
CARDIAC TROPONIN I PNL SERPL HS: 4 NG/L (ref 0–13)
CHLORIDE SERPL-SCNC: 103 MMOL/L (ref 98–107)
CO2 SERPL-SCNC: 26 MMOL/L (ref 21–32)
CREAT SERPL-MCNC: 0.83 MG/DL (ref 0.5–1.05)
EGFRCR SERPLBLD CKD-EPI 2021: 78 ML/MIN/1.73M*2
EOSINOPHIL # BLD AUTO: 0.02 X10*3/UL (ref 0–0.7)
EOSINOPHIL NFR BLD AUTO: 0.3 %
ERYTHROCYTE [DISTWIDTH] IN BLOOD BY AUTOMATED COUNT: 12.8 % (ref 11.5–14.5)
GLUCOSE SERPL-MCNC: 87 MG/DL (ref 74–99)
HCT VFR BLD AUTO: 43 % (ref 36–46)
HGB BLD-MCNC: 14.6 G/DL (ref 12–16)
IMM GRANULOCYTES # BLD AUTO: 0.02 X10*3/UL (ref 0–0.7)
IMM GRANULOCYTES NFR BLD AUTO: 0.3 % (ref 0–0.9)
LYMPHOCYTES # BLD AUTO: 1.3 X10*3/UL (ref 1.2–4.8)
LYMPHOCYTES NFR BLD AUTO: 16.3 %
MCH RBC QN AUTO: 31.5 PG (ref 26–34)
MCHC RBC AUTO-ENTMCNC: 34 G/DL (ref 32–36)
MCV RBC AUTO: 93 FL (ref 80–100)
MONOCYTES # BLD AUTO: 0.59 X10*3/UL (ref 0.1–1)
MONOCYTES NFR BLD AUTO: 7.4 %
NEUTROPHILS # BLD AUTO: 6 X10*3/UL (ref 1.2–7.7)
NEUTROPHILS NFR BLD AUTO: 75.2 %
NRBC BLD-RTO: 0 /100 WBCS (ref 0–0)
PLATELET # BLD AUTO: 197 X10*3/UL (ref 150–450)
POTASSIUM SERPL-SCNC: 4.3 MMOL/L (ref 3.5–5.3)
PROT SERPL-MCNC: 7.1 G/DL (ref 6.4–8.2)
RBC # BLD AUTO: 4.64 X10*6/UL (ref 4–5.2)
SODIUM SERPL-SCNC: 139 MMOL/L (ref 136–145)
WBC # BLD AUTO: 8 X10*3/UL (ref 4.4–11.3)

## 2025-05-16 PROCEDURE — 72125 CT NECK SPINE W/O DYE: CPT | Performed by: RADIOLOGY

## 2025-05-16 PROCEDURE — 2500000001 HC RX 250 WO HCPCS SELF ADMINISTERED DRUGS (ALT 637 FOR MEDICARE OP): Performed by: INTERNAL MEDICINE

## 2025-05-16 PROCEDURE — 80053 COMPREHEN METABOLIC PANEL: CPT

## 2025-05-16 PROCEDURE — 36415 COLL VENOUS BLD VENIPUNCTURE: CPT

## 2025-05-16 PROCEDURE — 72131 CT LUMBAR SPINE W/O DYE: CPT

## 2025-05-16 PROCEDURE — 2500000004 HC RX 250 GENERAL PHARMACY W/ HCPCS (ALT 636 FOR OP/ED): Performed by: INTERNAL MEDICINE

## 2025-05-16 PROCEDURE — 1159F MED LIST DOCD IN RCRD: CPT | Performed by: INTERNAL MEDICINE

## 2025-05-16 PROCEDURE — 3077F SYST BP >= 140 MM HG: CPT | Performed by: INTERNAL MEDICINE

## 2025-05-16 PROCEDURE — 96375 TX/PRO/DX INJ NEW DRUG ADDON: CPT

## 2025-05-16 PROCEDURE — 3079F DIAST BP 80-89 MM HG: CPT | Performed by: INTERNAL MEDICINE

## 2025-05-16 PROCEDURE — 84484 ASSAY OF TROPONIN QUANT: CPT

## 2025-05-16 PROCEDURE — 85025 COMPLETE CBC W/AUTO DIFF WBC: CPT

## 2025-05-16 PROCEDURE — G0378 HOSPITAL OBSERVATION PER HR: HCPCS

## 2025-05-16 PROCEDURE — 70450 CT HEAD/BRAIN W/O DYE: CPT

## 2025-05-16 PROCEDURE — 70450 CT HEAD/BRAIN W/O DYE: CPT | Performed by: RADIOLOGY

## 2025-05-16 PROCEDURE — 99213 OFFICE O/P EST LOW 20 MIN: CPT | Performed by: INTERNAL MEDICINE

## 2025-05-16 PROCEDURE — 93005 ELECTROCARDIOGRAM TRACING: CPT

## 2025-05-16 PROCEDURE — 72131 CT LUMBAR SPINE W/O DYE: CPT | Performed by: RADIOLOGY

## 2025-05-16 PROCEDURE — 99285 EMERGENCY DEPT VISIT HI MDM: CPT | Mod: 25 | Performed by: EMERGENCY MEDICINE

## 2025-05-16 PROCEDURE — 71046 X-RAY EXAM CHEST 2 VIEWS: CPT | Performed by: RADIOLOGY

## 2025-05-16 PROCEDURE — 2500000005 HC RX 250 GENERAL PHARMACY W/O HCPCS: Performed by: INTERNAL MEDICINE

## 2025-05-16 PROCEDURE — 2500000004 HC RX 250 GENERAL PHARMACY W/ HCPCS (ALT 636 FOR OP/ED): Mod: JZ | Performed by: EMERGENCY MEDICINE

## 2025-05-16 PROCEDURE — 3008F BODY MASS INDEX DOCD: CPT | Performed by: INTERNAL MEDICINE

## 2025-05-16 PROCEDURE — 71046 X-RAY EXAM CHEST 2 VIEWS: CPT

## 2025-05-16 PROCEDURE — 72125 CT NECK SPINE W/O DYE: CPT

## 2025-05-16 PROCEDURE — 96374 THER/PROPH/DIAG INJ IV PUSH: CPT

## 2025-05-16 RX ORDER — ACETAMINOPHEN 160 MG/5ML
650 SOLUTION ORAL EVERY 4 HOURS PRN
Status: DISCONTINUED | OUTPATIENT
Start: 2025-05-16 | End: 2025-05-19 | Stop reason: HOSPADM

## 2025-05-16 RX ORDER — GUAIFENESIN 600 MG/1
600 TABLET, EXTENDED RELEASE ORAL EVERY 12 HOURS PRN
Status: DISCONTINUED | OUTPATIENT
Start: 2025-05-16 | End: 2025-05-19 | Stop reason: HOSPADM

## 2025-05-16 RX ORDER — ONDANSETRON HYDROCHLORIDE 2 MG/ML
4 INJECTION, SOLUTION INTRAVENOUS EVERY 8 HOURS PRN
Status: DISCONTINUED | OUTPATIENT
Start: 2025-05-16 | End: 2025-05-19 | Stop reason: HOSPADM

## 2025-05-16 RX ORDER — ONDANSETRON HYDROCHLORIDE 2 MG/ML
4 INJECTION, SOLUTION INTRAVENOUS EVERY 6 HOURS PRN
Status: CANCELLED | OUTPATIENT
Start: 2025-05-16

## 2025-05-16 RX ORDER — MORPHINE SULFATE 4 MG/ML
4 INJECTION, SOLUTION INTRAMUSCULAR; INTRAVENOUS ONCE
Status: COMPLETED | OUTPATIENT
Start: 2025-05-16 | End: 2025-05-16

## 2025-05-16 RX ORDER — CARVEDILOL 3.12 MG/1
3.12 TABLET ORAL
Status: DISCONTINUED | OUTPATIENT
Start: 2025-05-17 | End: 2025-05-19 | Stop reason: HOSPADM

## 2025-05-16 RX ORDER — PANTOPRAZOLE SODIUM 40 MG/10ML
40 INJECTION, POWDER, LYOPHILIZED, FOR SOLUTION INTRAVENOUS
Status: DISCONTINUED | OUTPATIENT
Start: 2025-05-17 | End: 2025-05-19 | Stop reason: HOSPADM

## 2025-05-16 RX ORDER — PANTOPRAZOLE SODIUM 40 MG/1
40 TABLET, DELAYED RELEASE ORAL
Status: DISCONTINUED | OUTPATIENT
Start: 2025-05-17 | End: 2025-05-19 | Stop reason: HOSPADM

## 2025-05-16 RX ORDER — IBUPROFEN 200 MG
1 TABLET ORAL EVERY 24 HOURS
Status: DISCONTINUED | OUTPATIENT
Start: 2025-05-16 | End: 2025-05-19 | Stop reason: HOSPADM

## 2025-05-16 RX ORDER — MECLIZINE HYDROCHLORIDE 25 MG/1
25 TABLET ORAL 3 TIMES DAILY PRN
Status: DISCONTINUED | OUTPATIENT
Start: 2025-05-16 | End: 2025-05-19 | Stop reason: HOSPADM

## 2025-05-16 RX ORDER — POLYETHYLENE GLYCOL 3350 17 G/17G
17 POWDER, FOR SOLUTION ORAL DAILY
Status: DISCONTINUED | OUTPATIENT
Start: 2025-05-17 | End: 2025-05-19 | Stop reason: HOSPADM

## 2025-05-16 RX ORDER — ACETAMINOPHEN 650 MG/1
650 SUPPOSITORY RECTAL EVERY 4 HOURS PRN
Status: DISCONTINUED | OUTPATIENT
Start: 2025-05-16 | End: 2025-05-19 | Stop reason: HOSPADM

## 2025-05-16 RX ORDER — TALC
3 POWDER (GRAM) TOPICAL NIGHTLY
Status: DISCONTINUED | OUTPATIENT
Start: 2025-05-16 | End: 2025-05-19 | Stop reason: HOSPADM

## 2025-05-16 RX ORDER — ATORVASTATIN CALCIUM 40 MG/1
40 TABLET, FILM COATED ORAL DAILY
Status: DISCONTINUED | OUTPATIENT
Start: 2025-05-17 | End: 2025-05-19 | Stop reason: HOSPADM

## 2025-05-16 RX ORDER — ACETAMINOPHEN 325 MG/1
650 TABLET ORAL EVERY 4 HOURS PRN
Status: DISCONTINUED | OUTPATIENT
Start: 2025-05-16 | End: 2025-05-19 | Stop reason: HOSPADM

## 2025-05-16 RX ORDER — GUAIFENESIN/DEXTROMETHORPHAN 100-10MG/5
5 SYRUP ORAL EVERY 4 HOURS PRN
Status: DISCONTINUED | OUTPATIENT
Start: 2025-05-16 | End: 2025-05-19 | Stop reason: HOSPADM

## 2025-05-16 RX ORDER — ENOXAPARIN SODIUM 100 MG/ML
40 INJECTION SUBCUTANEOUS EVERY 24 HOURS
Status: DISCONTINUED | OUTPATIENT
Start: 2025-05-16 | End: 2025-05-19 | Stop reason: HOSPADM

## 2025-05-16 RX ORDER — ONDANSETRON 4 MG/1
4 TABLET, FILM COATED ORAL EVERY 8 HOURS PRN
Status: DISCONTINUED | OUTPATIENT
Start: 2025-05-16 | End: 2025-05-19 | Stop reason: HOSPADM

## 2025-05-16 RX ORDER — DOCUSATE SODIUM 100 MG/1
100 CAPSULE, LIQUID FILLED ORAL 2 TIMES DAILY
Status: DISCONTINUED | OUTPATIENT
Start: 2025-05-16 | End: 2025-05-19 | Stop reason: HOSPADM

## 2025-05-16 RX ORDER — ORPHENADRINE CITRATE 30 MG/ML
60 INJECTION INTRAMUSCULAR; INTRAVENOUS ONCE
Status: COMPLETED | OUTPATIENT
Start: 2025-05-16 | End: 2025-05-16

## 2025-05-16 RX ADMIN — Medication 3 MG: at 22:53

## 2025-05-16 RX ADMIN — ACETAMINOPHEN 650 MG: 325 TABLET ORAL at 22:53

## 2025-05-16 RX ADMIN — ONDANSETRON HYDROCHLORIDE 4 MG: 4 TABLET, FILM COATED ORAL at 22:53

## 2025-05-16 RX ADMIN — ORPHENADRINE CITRATE 60 MG: 60 INJECTION INTRAMUSCULAR; INTRAVENOUS at 19:55

## 2025-05-16 RX ADMIN — MORPHINE SULFATE 4 MG: 4 INJECTION, SOLUTION INTRAMUSCULAR; INTRAVENOUS at 19:55

## 2025-05-16 RX ADMIN — SACUBITRIL AND VALSARTAN 1 TABLET: 24; 26 TABLET, FILM COATED ORAL at 22:49

## 2025-05-16 SDOH — SOCIAL STABILITY: SOCIAL INSECURITY: HAVE YOU HAD THOUGHTS OF HARMING ANYONE ELSE?: NO

## 2025-05-16 SDOH — HEALTH STABILITY: MENTAL HEALTH: HOW OFTEN DO YOU HAVE A DRINK CONTAINING ALCOHOL?: NEVER

## 2025-05-16 SDOH — HEALTH STABILITY: MENTAL HEALTH: HOW OFTEN DO YOU HAVE SIX OR MORE DRINKS ON ONE OCCASION?: NEVER

## 2025-05-16 SDOH — SOCIAL STABILITY: SOCIAL INSECURITY
WITHIN THE LAST YEAR, HAVE YOU BEEN KICKED, HIT, SLAPPED, OR OTHERWISE PHYSICALLY HURT BY YOUR PARTNER OR EX-PARTNER?: NO

## 2025-05-16 SDOH — ECONOMIC STABILITY: INCOME INSECURITY: IN THE PAST 12 MONTHS HAS THE ELECTRIC, GAS, OIL, OR WATER COMPANY THREATENED TO SHUT OFF SERVICES IN YOUR HOME?: NO

## 2025-05-16 SDOH — SOCIAL STABILITY: SOCIAL INSECURITY: WITHIN THE LAST YEAR, HAVE YOU BEEN HUMILIATED OR EMOTIONALLY ABUSED IN OTHER WAYS BY YOUR PARTNER OR EX-PARTNER?: NO

## 2025-05-16 SDOH — SOCIAL STABILITY: SOCIAL INSECURITY: WITHIN THE LAST YEAR, HAVE YOU BEEN AFRAID OF YOUR PARTNER OR EX-PARTNER?: NO

## 2025-05-16 SDOH — SOCIAL STABILITY: SOCIAL INSECURITY
WITHIN THE LAST YEAR, HAVE YOU BEEN RAPED OR FORCED TO HAVE ANY KIND OF SEXUAL ACTIVITY BY YOUR PARTNER OR EX-PARTNER?: NO

## 2025-05-16 SDOH — ECONOMIC STABILITY: FOOD INSECURITY: WITHIN THE PAST 12 MONTHS, THE FOOD YOU BOUGHT JUST DIDN'T LAST AND YOU DIDN'T HAVE MONEY TO GET MORE.: NEVER TRUE

## 2025-05-16 SDOH — ECONOMIC STABILITY: FOOD INSECURITY: WITHIN THE PAST 12 MONTHS, YOU WORRIED THAT YOUR FOOD WOULD RUN OUT BEFORE YOU GOT THE MONEY TO BUY MORE.: NEVER TRUE

## 2025-05-16 SDOH — SOCIAL STABILITY: SOCIAL INSECURITY: WERE YOU ABLE TO COMPLETE ALL THE BEHAVIORAL HEALTH SCREENINGS?: YES

## 2025-05-16 ASSESSMENT — ACTIVITIES OF DAILY LIVING (ADL)
BATHING: INDEPENDENT
HEARING - LEFT EAR: FUNCTIONAL
ADEQUATE_TO_COMPLETE_ADL: YES
LACK_OF_TRANSPORTATION: NO
DRESSING YOURSELF: INDEPENDENT
JUDGMENT_ADEQUATE_SAFELY_COMPLETE_DAILY_ACTIVITIES: YES
HEARING - RIGHT EAR: FUNCTIONAL
PATIENT'S MEMORY ADEQUATE TO SAFELY COMPLETE DAILY ACTIVITIES?: YES
TOILETING: INDEPENDENT
WALKS IN HOME: INDEPENDENT
GROOMING: INDEPENDENT
ASSISTIVE_DEVICE: DENTURES LOWER;DENTURES UPPER
FEEDING YOURSELF: INDEPENDENT

## 2025-05-16 ASSESSMENT — LIFESTYLE VARIABLES
HOW MANY STANDARD DRINKS CONTAINING ALCOHOL DO YOU HAVE ON A TYPICAL DAY: PATIENT DOES NOT DRINK
HOW OFTEN DO YOU HAVE A DRINK CONTAINING ALCOHOL: NEVER
AUDIT-C TOTAL SCORE: 0
AUDIT-C TOTAL SCORE: 0
SKIP TO QUESTIONS 9-10: 1
HOW OFTEN DO YOU HAVE 6 OR MORE DRINKS ON ONE OCCASION: NEVER

## 2025-05-16 ASSESSMENT — COGNITIVE AND FUNCTIONAL STATUS - GENERAL
DAILY ACTIVITIY SCORE: 24
PATIENT BASELINE BEDBOUND: NO
MOBILITY SCORE: 24

## 2025-05-16 ASSESSMENT — COLUMBIA-SUICIDE SEVERITY RATING SCALE - C-SSRS
6. HAVE YOU EVER DONE ANYTHING, STARTED TO DO ANYTHING, OR PREPARED TO DO ANYTHING TO END YOUR LIFE?: NO
1. IN THE PAST MONTH, HAVE YOU WISHED YOU WERE DEAD OR WISHED YOU COULD GO TO SLEEP AND NOT WAKE UP?: NO
2. HAVE YOU ACTUALLY HAD ANY THOUGHTS OF KILLING YOURSELF?: NO

## 2025-05-16 ASSESSMENT — PATIENT HEALTH QUESTIONNAIRE - PHQ9
SUM OF ALL RESPONSES TO PHQ9 QUESTIONS 1 & 2: 0
2. FEELING DOWN, DEPRESSED OR HOPELESS: NOT AT ALL
1. LITTLE INTEREST OR PLEASURE IN DOING THINGS: NOT AT ALL

## 2025-05-16 ASSESSMENT — PAIN - FUNCTIONAL ASSESSMENT: PAIN_FUNCTIONAL_ASSESSMENT: 0-10

## 2025-05-16 ASSESSMENT — PAIN DESCRIPTION - LOCATION: LOCATION: BACK

## 2025-05-16 ASSESSMENT — PAIN SCALES - GENERAL: PAINLEVEL_OUTOF10: 5 - MODERATE PAIN

## 2025-05-16 NOTE — ED TRIAGE NOTES
Pt to ED with multiple complaints. States was at work this morning and had syncopal episode around 0930. States hx of same, currently is wearing Holter monitor because of syncopal episodes. Denies CP/SOB. Denies blood thinner use. Endorses back pain after fall. Pt has laceration on the back side of head from fall. Bleeding controlled.
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
## 2025-05-16 NOTE — ED TRIAGE NOTES
TRIAGE NOTE   I saw the patient as the Clinician in Triage and performed a brief history and physical exam, established acuity, and ordered appropriate tests to develop basic plan of care. Patient will be seen by an EVIN, resident and/or physician who will independently evaluate the patient. Please see subsequent provider notes for further details and disposition.     Brief HPI: In brief, Gavi Tejeda is a 64 y.o. female that presents for syncope.  Patient reports that she has syncopal episode prior to arrival.  She was at work when outside and she felt lightheaded dizzy and passed out. Next thing  knew she was lying on her back looking up at everybody around her.  sustained a laceration to the occiput of the head.  No fevers.  No nausea or vomiting.  No chest pain.  Reports some lower back pain since the fall.  No abdominal pain.  No chest pain or shortness of breath.  Did not sustain any other injuries.  No saddle anesthesia or urinary/fecal incontinence or retention.  Reports that she has a Holter on now due to having syncopal episodes.    Focused Physical exam:   NIH 0.  Heart regular rate and rhythm.  Plan/MDM:   Initiating basic labs, troponin, EKG, chest x-ray, CT head, CT c and L-spine.  Patient will be seen in the back of the ED for further evaluation and treatment.  I will not be following with this patient during her ED visit.  This is a preliminary evaluation during triage.  Informed nursing staff that patient should be transported to the back of the ED immediately for further evaluation and treatment.    I evaluated this patient in triage with the RN. Due to the patients complaint labs and or imaging were ordered by myself in an attempt to expedite patient care however I am not participating in care after evaluation. This is a preliminary assessment. Pt does not appear in acute distress at this time. They will have a full evaluation as soon as possible. They will be cared for by another provider who  will possibly order more labs, imaging or interventions. Pt did not have a full ROS or PE completed by myself however below is a summary with reasons for orders.  For the remainder of the patient's workup and ED course, please refer to the main ED provider note. We discussed need for diagnostic testing including laboratory studies and imaging.  We also discussed that patient may be asked to wait in the waiting room while these tests are pending.  They understand that if they choose to leave without having the testing completed or resulted that we cannot rule out acute life threatening illnesses and the risks involved could lead to worsening condition, permanent disability or even death.     Please see subsequent provider note for further details and disposition

## 2025-05-17 ENCOUNTER — APPOINTMENT (OUTPATIENT)
Dept: CARDIOLOGY | Facility: HOSPITAL | Age: 65
End: 2025-05-17
Payer: MEDICARE

## 2025-05-17 ENCOUNTER — APPOINTMENT (OUTPATIENT)
Dept: RADIOLOGY | Facility: HOSPITAL | Age: 65
End: 2025-05-17
Payer: MEDICARE

## 2025-05-17 PROBLEM — S01.01XA SCALP LACERATION: Status: ACTIVE | Noted: 2025-05-17

## 2025-05-17 PROBLEM — M54.50 ACUTE MIDLINE LOW BACK PAIN WITHOUT SCIATICA: Status: ACTIVE | Noted: 2025-05-17

## 2025-05-17 LAB
ALBUMIN SERPL BCP-MCNC: 3.9 G/DL (ref 3.4–5)
ALP SERPL-CCNC: 77 U/L (ref 33–136)
ALT SERPL W P-5'-P-CCNC: 15 U/L (ref 7–45)
ANION GAP SERPL CALC-SCNC: 10 MMOL/L (ref 10–20)
AST SERPL W P-5'-P-CCNC: 27 U/L (ref 9–39)
BILIRUB SERPL-MCNC: 0.5 MG/DL (ref 0–1.2)
BUN SERPL-MCNC: 20 MG/DL (ref 6–23)
CALCIUM SERPL-MCNC: 9.1 MG/DL (ref 8.6–10.3)
CHLORIDE SERPL-SCNC: 108 MMOL/L (ref 98–107)
CO2 SERPL-SCNC: 25 MMOL/L (ref 21–32)
CREAT SERPL-MCNC: 0.74 MG/DL (ref 0.5–1.05)
D DIMER PPP FEU-MCNC: 1465 NG/ML FEU
EGFRCR SERPLBLD CKD-EPI 2021: 90 ML/MIN/1.73M*2
EJECTION FRACTION APICAL 4 CHAMBER: 48.4
EJECTION FRACTION: 50 %
ERYTHROCYTE [DISTWIDTH] IN BLOOD BY AUTOMATED COUNT: 12.7 % (ref 11.5–14.5)
GLUCOSE SERPL-MCNC: 94 MG/DL (ref 74–99)
HCT VFR BLD AUTO: 37.9 % (ref 36–46)
HGB BLD-MCNC: 13.1 G/DL (ref 12–16)
LEFT VENTRICLE INTERNAL DIMENSION DIASTOLE: 5.64 CM (ref 3.5–6)
MCH RBC QN AUTO: 31.2 PG (ref 26–34)
MCHC RBC AUTO-ENTMCNC: 34.6 G/DL (ref 32–36)
MCV RBC AUTO: 90 FL (ref 80–100)
MITRAL VALVE E/A RATIO: 0.91
NRBC BLD-RTO: 0 /100 WBCS (ref 0–0)
PLATELET # BLD AUTO: 174 X10*3/UL (ref 150–450)
POTASSIUM SERPL-SCNC: 4.2 MMOL/L (ref 3.5–5.3)
PROT SERPL-MCNC: 6 G/DL (ref 6.4–8.2)
RBC # BLD AUTO: 4.2 X10*6/UL (ref 4–5.2)
RIGHT VENTRICLE PEAK SYSTOLIC PRESSURE: 22.9 MMHG
SODIUM SERPL-SCNC: 139 MMOL/L (ref 136–145)
WBC # BLD AUTO: 6.7 X10*3/UL (ref 4.4–11.3)

## 2025-05-17 PROCEDURE — 85027 COMPLETE CBC AUTOMATED: CPT | Performed by: INTERNAL MEDICINE

## 2025-05-17 PROCEDURE — 93325 DOPPLER ECHO COLOR FLOW MAPG: CPT | Performed by: INTERNAL MEDICINE

## 2025-05-17 PROCEDURE — 2500000004 HC RX 250 GENERAL PHARMACY W/ HCPCS (ALT 636 FOR OP/ED): Mod: JZ | Performed by: INTERNAL MEDICINE

## 2025-05-17 PROCEDURE — 99222 1ST HOSP IP/OBS MODERATE 55: CPT | Performed by: STUDENT IN AN ORGANIZED HEALTH CARE EDUCATION/TRAINING PROGRAM

## 2025-05-17 PROCEDURE — 71275 CT ANGIOGRAPHY CHEST: CPT

## 2025-05-17 PROCEDURE — 2550000001 HC RX 255 CONTRASTS: Performed by: INTERNAL MEDICINE

## 2025-05-17 PROCEDURE — 80053 COMPREHEN METABOLIC PANEL: CPT | Performed by: INTERNAL MEDICINE

## 2025-05-17 PROCEDURE — 2500000005 HC RX 250 GENERAL PHARMACY W/O HCPCS: Performed by: INTERNAL MEDICINE

## 2025-05-17 PROCEDURE — 36415 COLL VENOUS BLD VENIPUNCTURE: CPT

## 2025-05-17 PROCEDURE — 99222 1ST HOSP IP/OBS MODERATE 55: CPT | Performed by: INTERNAL MEDICINE

## 2025-05-17 PROCEDURE — 93321 DOPPLER ECHO F-UP/LMTD STD: CPT | Performed by: INTERNAL MEDICINE

## 2025-05-17 PROCEDURE — 97161 PT EVAL LOW COMPLEX 20 MIN: CPT | Mod: GP

## 2025-05-17 PROCEDURE — 93325 DOPPLER ECHO COLOR FLOW MAPG: CPT

## 2025-05-17 PROCEDURE — 85379 FIBRIN DEGRADATION QUANT: CPT

## 2025-05-17 PROCEDURE — G0378 HOSPITAL OBSERVATION PER HR: HCPCS

## 2025-05-17 PROCEDURE — 71275 CT ANGIOGRAPHY CHEST: CPT | Mod: FOREIGN READ | Performed by: RADIOLOGY

## 2025-05-17 PROCEDURE — 96372 THER/PROPH/DIAG INJ SC/IM: CPT | Mod: 59 | Performed by: INTERNAL MEDICINE

## 2025-05-17 PROCEDURE — 2500000001 HC RX 250 WO HCPCS SELF ADMINISTERED DRUGS (ALT 637 FOR MEDICARE OP): Performed by: INTERNAL MEDICINE

## 2025-05-17 PROCEDURE — 36415 COLL VENOUS BLD VENIPUNCTURE: CPT | Performed by: INTERNAL MEDICINE

## 2025-05-17 PROCEDURE — 93308 TTE F-UP OR LMTD: CPT | Performed by: INTERNAL MEDICINE

## 2025-05-17 RX ORDER — TRAMADOL HYDROCHLORIDE 50 MG/1
50 TABLET, FILM COATED ORAL EVERY 6 HOURS PRN
Status: DISCONTINUED | OUTPATIENT
Start: 2025-05-17 | End: 2025-05-19 | Stop reason: HOSPADM

## 2025-05-17 RX ADMIN — DOCUSATE SODIUM 100 MG: 100 CAPSULE, LIQUID FILLED ORAL at 08:49

## 2025-05-17 RX ADMIN — ATORVASTATIN CALCIUM 40 MG: 40 TABLET, FILM COATED ORAL at 08:49

## 2025-05-17 RX ADMIN — TRAMADOL HYDROCHLORIDE 50 MG: 50 TABLET, COATED ORAL at 08:58

## 2025-05-17 RX ADMIN — Medication 3 MG: at 22:55

## 2025-05-17 RX ADMIN — IOHEXOL 90 ML: 350 INJECTION, SOLUTION INTRAVENOUS at 15:41

## 2025-05-17 RX ADMIN — EMPAGLIFLOZIN 10 MG: 10 TABLET, FILM COATED ORAL at 08:49

## 2025-05-17 RX ADMIN — PANTOPRAZOLE SODIUM 40 MG: 40 TABLET, DELAYED RELEASE ORAL at 08:50

## 2025-05-17 RX ADMIN — SACUBITRIL AND VALSARTAN 1 TABLET: 24; 26 TABLET, FILM COATED ORAL at 08:49

## 2025-05-17 RX ADMIN — ENOXAPARIN SODIUM 40 MG: 40 INJECTION SUBCUTANEOUS at 22:55

## 2025-05-17 RX ADMIN — TRAMADOL HYDROCHLORIDE 50 MG: 50 TABLET, COATED ORAL at 15:34

## 2025-05-17 RX ADMIN — CARVEDILOL 3.12 MG: 3.12 TABLET, FILM COATED ORAL at 08:50

## 2025-05-17 RX ADMIN — TRAMADOL HYDROCHLORIDE 50 MG: 50 TABLET, COATED ORAL at 22:58

## 2025-05-17 RX ADMIN — CARVEDILOL 3.12 MG: 3.12 TABLET, FILM COATED ORAL at 16:41

## 2025-05-17 RX ADMIN — SACUBITRIL AND VALSARTAN 1 TABLET: 24; 26 TABLET, FILM COATED ORAL at 22:55

## 2025-05-17 ASSESSMENT — COGNITIVE AND FUNCTIONAL STATUS - GENERAL
CLIMB 3 TO 5 STEPS WITH RAILING: A LITTLE
CLIMB 3 TO 5 STEPS WITH RAILING: A LITTLE
MOBILITY SCORE: 23
MOBILITY SCORE: 23
DAILY ACTIVITIY SCORE: 24

## 2025-05-17 ASSESSMENT — ENCOUNTER SYMPTOMS
TREMORS: 0
SHORTNESS OF BREATH: 0
DYSURIA: 0
VOMITING: 0
HEADACHES: 1
POLYDIPSIA: 0
APPETITE CHANGE: 0
NUMBNESS: 0
DIFFICULTY URINATING: 0
BLOOD IN STOOL: 0
SLEEP DISTURBANCE: 0
LIGHT-HEADEDNESS: 0
ABDOMINAL DISTENTION: 0
EYE ITCHING: 0
BACK PAIN: 0
TROUBLE SWALLOWING: 0
CHOKING: 0
HEMATURIA: 0
SORE THROAT: 0
EYE DISCHARGE: 0
FATIGUE: 0
EYE REDNESS: 0
FLANK PAIN: 0
DYSPHORIC MOOD: 0
WOUND: 0
FREQUENCY: 0
PALPITATIONS: 0
UNEXPECTED WEIGHT CHANGE: 0
COUGH: 0
RHINORRHEA: 0
ADENOPATHY: 0
WEAKNESS: 0
SEIZURES: 0
CHILLS: 0
HYPERACTIVE: 0
SINUS PAIN: 0
CONFUSION: 0
RECTAL PAIN: 0
NERVOUS/ANXIOUS: 0
AGITATION: 0
CHEST TIGHTNESS: 0
MYALGIAS: 0
APNEA: 0
FEVER: 0
DIZZINESS: 1
ABDOMINAL PAIN: 0
FACIAL ASYMMETRY: 0
STRIDOR: 0
DIARRHEA: 0
COLOR CHANGE: 0
NAUSEA: 0
JOINT SWELLING: 0
NECK PAIN: 0
CONSTIPATION: 0
ANAL BLEEDING: 0
WHEEZING: 0
PHOTOPHOBIA: 0
SPEECH DIFFICULTY: 0
ARTHRALGIAS: 0
HALLUCINATIONS: 0
FACIAL SWELLING: 0
ACTIVITY CHANGE: 0
VOICE CHANGE: 0
NECK STIFFNESS: 0
DECREASED CONCENTRATION: 0
DIAPHORESIS: 0
EYE PAIN: 0
POLYPHAGIA: 0
BRUISES/BLEEDS EASILY: 0
SINUS PRESSURE: 0

## 2025-05-17 ASSESSMENT — PAIN SCALES - GENERAL
PAINLEVEL_OUTOF10: 7
PAINLEVEL_OUTOF10: 8
PAINLEVEL_OUTOF10: 3
PAINLEVEL_OUTOF10: 8
PAINLEVEL_OUTOF10: 6
PAINLEVEL_OUTOF10: 2
PAINLEVEL_OUTOF10: 6
PAINLEVEL_OUTOF10: 0 - NO PAIN

## 2025-05-17 ASSESSMENT — PAIN DESCRIPTION - DESCRIPTORS
DESCRIPTORS: ACHING

## 2025-05-17 ASSESSMENT — PAIN DESCRIPTION - LOCATION
LOCATION: BACK

## 2025-05-17 ASSESSMENT — PAIN DESCRIPTION - ORIENTATION
ORIENTATION: LOWER
ORIENTATION: LOWER

## 2025-05-17 ASSESSMENT — PAIN - FUNCTIONAL ASSESSMENT
PAIN_FUNCTIONAL_ASSESSMENT: 0-10

## 2025-05-17 ASSESSMENT — ACTIVITIES OF DAILY LIVING (ADL)
ADL_ASSISTANCE: INDEPENDENT
LACK_OF_TRANSPORTATION: NO
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
## 2025-05-17 NOTE — ED PROVIDER NOTES
Emergency Department Provider Note       History of Present Illness     History provided by: Patient  Limitations to History: None  External Records Reviewed with Brief Summary: Outpatient records show a history of breast cancer, meningioma, cholecystectomy, knee replacement, depression, anxiety, kidney stone    HPI:  Gavi Tejeda is a 64 y.o. female who presents to the emergency department complaining of syncope.  The patient states that she was at work peeling potatoes when suddenly she was being helped off the floor.  Did not feel lightheaded or dizzy prior to the episode.  She denies chest pain.  She did strike the back of her head.  She complains of low back pain since the fall.  She denies bowel or bladder dysfunction.  She denies weakness in her lower extremities.  The patient is currently wearing Zio patch as she has been passing out.    Physical Exam   Triage vitals:  T 36.7 °C (98.1 °F)  HR 85  /71  RR 18  O2 98 % None (Room air)    Physical Exam  Vitals and nursing note reviewed.   HENT:      Head: Normocephalic and atraumatic.      Nose: Nose normal.   Eyes:      Conjunctiva/sclera: Conjunctivae normal.   Cardiovascular:      Rate and Rhythm: Normal rate and regular rhythm.      Pulses: Normal pulses.      Heart sounds: Normal heart sounds.   Pulmonary:      Effort: Pulmonary effort is normal.      Breath sounds: Normal breath sounds.   Abdominal:      General: Bowel sounds are normal.      Palpations: Abdomen is soft.   Musculoskeletal:         General: Normal range of motion.      Cervical back: Normal range of motion and neck supple.   Skin:     Findings: No rash.      Comments: Small laceration over the occiput without any active bleeding.   Neurological:      General: No focal deficit present.      Mental Status: She is alert and oriented to person, place, and time.   Psychiatric:         Mood and Affect: Mood normal.           Medical Decision Making & ED Course   Medical Decision  [age] y.o. female who presents to the emergency department due to syncope.  The patient was to be directly admitted by her primary physician but no beds were available.  Therefore she came to the emergency department.  While in the emergency department she was evaluated with EKG, labs and CT imaging.  EKG showed a normal sinus rhythm, heart rate 71, inverted T waves which are unchanged compared to 2025.  The CT imaging showed no acute traumatic injuries.  Her labs were unremarkable.  Her back pain was treated with morphine and Norflex.  She was admitted per her primary physicians initial plan.  ----      Differential diagnoses considered include but are not limited to: Differential diagnosis: I have considered the following conditions in my assessment of this patient's condition: Hypotension, syncope, near syncope, abdominal aneurysm, cerebral vascular accident, transient ischemic attack, GI bleed, ACS, pulmonary embolus, seizure, arrhythmia.    Social Determinants of Health which Significantly Impact Care: Social Determinants of Health which Significantly Impact Care: None identified     EKG Independent Interpretation: EKG interpreted by myself. Please see ED Course for full interpretation.    Independent Result Review and Interpretation: Relevant laboratory and radiographic results were reviewed and independently interpreted by myself.  As necessary, they are commented on in the ED Course.    Chronic conditions affecting the patient's care: As documented above in [facility]    The patient was discussed with the following consultants/services: Dr. Ashley Bradley    Care Considerations: As documented above in [facility]    ED Course:  Diagnoses as of 25   Syncope, unspecified syncope type   Injury of head, initial encounter   Back strain, initial encounter       Disposition   As a result of their workup, the patient will require admission to the hospital.  The patient was informed of her diagnosis.  The patient  was given the opportunity to ask questions and I answered them. The patient agreed to be admitted to the hospital.    Procedures   Procedures        Presley Bates MD  Emergency Medicine                                                            Presley Bates MD  05/16/25 5012
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
## 2025-05-17 NOTE — PROGRESS NOTES
05/17/25 1033   Discharge Planning   Living Arrangements Children  (Adult son)   Support Systems Children;Family members   Assistance Needed PTA; Independent w/ IADL/ADL's, no need for asst device   Type of Residence Private residence  (demo correct)   Number of Stairs to Enter Residence 3   Number of Stairs Within Residence 0   Home or Post Acute Services None   Expected Discharge Disposition Home  (patient denies any further needs at this time)   Does the patient need discharge transport arranged? No  (patient's son will provide transportation  home)   Financial Resource Strain   How hard is it for you to pay for the very basics like food, housing, medical care, and heating? Not very   Housing Stability   In the last 12 months, was there a time when you were not able to pay the mortgage or rent on time? N   In the past 12 months, how many times have you moved where you were living? 0   At any time in the past 12 months, were you homeless or living in a shelter (including now)? N   Transportation Needs   In the past 12 months, has lack of transportation kept you from medical appointments or from getting medications? no  (PCP listed; last office visit 5/16/2025)   In the past 12 months, has lack of transportation kept you from meetings, work, or from getting things needed for daily living? No   Stroke Family Assessment   Stroke Family Assessment Needed No   Intensity of Service   Intensity of Service 0-30 min     Care Coordinator Note:  Met with patient at bedside to discuss discharge planning needs   Plan: Initiating basic labs, troponin, EKG, chest x-ray, CT head, CT c and L-spine.   Status: observation d/t syncopal episode   Payor: [employer]   Disposition: Home; patient denies any further needs post discharge    Meme GARAY, RN [facility]

## 2025-05-17 NOTE — PROGRESS NOTES
05/17/25 1032   [facility] Disability Status   Are you deaf or do you have serious difficulty hearing? N   Are you blind or do you have serious difficulty seeing, even when wearing glasses? N   Because of a physical, mental, or emotional condition, do you have serious difficulty concentrating, remembering, or making decisions? (5 years old or older) N   Do you have serious difficulty walking or climbing stairs? N   Do you have serious difficulty dressing or bathing? N   Because of a physical, mental, or emotional condition, do you have serious difficulty doing errands alone such as visiting the doctor? N

## 2025-05-17 NOTE — CARE PLAN
The patient's goals for the shift include      The clinical goals for the shift include Decreased nausea

## 2025-05-17 NOTE — PROGRESS NOTES
Physical Therapy    Physical Therapy Evaluation    Patient Name: Gavi Tejeda  MRN: [id number]  Department: [facility] [place] [address]  Room: [address] [place] [address]  Today's Date: 5/17/2025   Time Calculation  Start Time: 1035  Stop Time: 1046  Time Calculation (min): 11 min    Assessment/Plan   PT Assessment  Rehab Prognosis: Excellent  Barriers to Discharge Home: No anticipated barriers  Evaluation/Treatment Tolerance: Patient tolerated treatment well  Medical Staff Made Aware: Yes  Strengths: Ability to acquire knowledge, Premorbid level of function, Attitude of self  Barriers to Participation:  (None identified)  End of Session Communication: Bedside nurse  Assessment Comment: Pt presents today with independence with functional mobility.Pt does not require phyiscal assistance to complete functional mobility tasks. Pt agrees that she is at baseline mobility, and not further skilled acute PT needs are identified at this time. Will plan to D/C PT order. Pt is not anticipated to require PT services at D/C.  End of Session Patient Position: Bed, 3 rail up, Alarm off, not on at start of session  IP OR SWING BED PT PLAN  Inpatient or Swing Bed: Inpatient  PT Plan  PT Plan: PT Eval only  PT Eval Only Reason: No acute PT needs identified  PT Frequency: PT eval only  PT Discharge Recommendations: No further acute PT  Equipment Recommended upon Discharge:  (No equipment recommendations noted at this time)  PT Recommended Transfer Status: Independent  PT - OK to Discharge: Yes    Subjective     PT Visit Info:  PT Received On: 05/17/25  General Visit Information:  General  Reason for Referral: 64 y.o. F presenting to the ED with c/o syncopal episode and back pain.  Referred By: CASSIUS MUNROE)  Past Medical History Relevant to Rehab: HTN, depression, anxiety, OA  Family/Caregiver Present: No  Prior to Session Communication: Bedside nurse  Patient Position Received: Bed, 3 rail up, Alarm off, not on at start of session  Preferred Learning Style: auditory,  "visual  General Comment: Pt supine in bed upon PT arrival. Cleared to participate with RN, and agreeable to PT evaluation.  Home Living:  Home Living  Type of Home: House  Lives With: Adult children (Son)  Home Adaptive Equipment: Cane  Home Layout: Able to live on main level with bedroom/bathroom, Laundry in basement, Stairs to alternate level with rails  Alternate Level Stairs-Rails: Both  Alternate Level Stairs-Number of Steps: 8-10 to basement  Home Access: Stairs to enter with rails  Entrance Stairs-Rails: Right  Entrance Stairs-Number of Steps: 3  Bathroom Shower/Tub: Tub/shower unit  Bathroom Toilet: Handicapped height  Bathroom Equipment: None  Prior Level of Function:  Prior Function Per Pt/Caregiver Report  Level of [place]: Independent with ADLs and functional transfers, Independent with homemaking with ambulation  Receives Help From: Family (Son)  ADL Assistance: Independent  Homemaking Assistance: Independent  Driving/Transportation: Independent  Ambulatory Assistance: Independent (No AD)  Vocational:  (Pt works in a kitchen)  Leisure: Pt enjoys riding her motorcyle ([email or web address])  Hand Dominance: Left  Prior Function Comments: Pt sleeps in a standard flat bed.  Precautions:  Precautions  Hearing/Visual Limitations: Pt wears \"cheaters\" and wears glasses at night  Medical Precautions: No known precautions/limitation    Objective   Pain:  Pain Assessment  Pain Assessment: 0-10  0-10 (Numeric) Pain Score: 3  Pain Type: Acute pain  Pain Location: Back  Pain Orientation: Lower  Pain Interventions: Ambulation/increased activity  Response to Interventions: No change in pain  Cognition:  Cognition  Orientation Level: Oriented X4  Insight: Within function limits    General Assessments:  Activity Tolerance  Endurance: Endurance does not limit participation in activity    Sensation  Light Touch: No apparent deficits    Coordination  Movements are Fluid and Coordinated: Yes    Postural Control  Postural Control: " Within Functional Limits    Static Sitting Balance  Static Sitting-Balance Support: Bilateral upper extremity supported, Feet supported  Static Sitting-Level of Assistance: Independent    Static Standing Balance  Static Standing-Balance Support: No upper extremity supported  Static Standing-Level of Assistance: Independent (Progressed from standby assist)  Static Standing-Comment/Number of Minutes: +Gait belt  Dynamic Standing Balance  Dynamic Standing-Balance Support: No upper extremity supported  Dynamic Standing-Level of Assistance: Independent (Progressed from standby assist)  Dynamic Standing-Balance: Turning  Dynamic Standing-Comments: +Gait belt  Functional Assessments:  Bed Mobility  Bed Mobility: Yes  Bed Mobility 1  Bed Mobility 1: Supine to sitting, Sitting to supine  Level of Assistance 1: Independent    Transfers  Transfer: Yes  Transfer 1  Transfer From 1: Bed to  Transfer to 1: Stand  Technique 1: Sit to stand  Transfer Device 1: Gait belt  Transfer Level of Assistance 1: Independent (Progressed from distant supervision)  Trials/Comments 1: Pt initiates BUE push from the bed to  a reasonable amount of time  Transfers 2  Transfer From 2: Stand to  Transfer to 2: Bed  Technique 2: Stand to sit  Transfer Device 2: Gait belt  Transfer Level of Assistance 2: Independent (Progressed from distant supervision)  Trials/Comments 2: Good LE positioning in front of the bed before sitting. Pt initiates UE reach for the bed for controlled descent.    Ambulation/Gait Training  Ambulation/Gait Training Performed: Yes  Ambulation/Gait Training 1  Surface 1: Level tile  Device 1: No device  Gait Support Devices: Gait belt  Assistance 1: Independent (Progressed from standby assist)  Comments/Distance (ft) 1: Pt ambulates with fair heel to toe reciprocal pattern. Fair arm swing noted, and forward gaze. No other notable gait deviations or instances of LOB observed.    Stairs  Stairs: No  Extremity/Trunk  Assessments:  RUE   RUE : Within Functional Limits  LUE   LUE: Within Functional Limits  RLE   RLE : Within Functional Limits  LLE   LLE : Within Functional Limits  Outcome Measures:  [facility] Basic Mobility  Turning from your back to your side while in a flat bed without using bedrails: None  Moving from lying on your back to sitting on the side of a flat bed without using bedrails: None  Moving to and from bed to chair (including a wheelchair): None  Standing up from a chair using your arms (e.g. wheelchair or bedside chair): None  To walk in hospital room: None  Climbing 3-5 steps with railing: A little  Basic Mobility - Total Score: 23    Education Documentation  Mobility Training, taught by Etienne Flores PT at 5/17/2025 12:16 PM.  Learner: Patient  Readiness: Acceptance  Method: Explanation, Demonstration  Response: Verbalizes Understanding, Demonstrated Understanding

## 2025-05-17 NOTE — CONSULTS
Consults    History Of Present Illness  Gavi Tejeda is a 64 y.o. female presenting with past medical history of nonobstructive CAD coronary artery disease, nonischemic cardiomyopathy with recent echo showing improvement of ejection fraction to 48% from 35.  History of COPD, hypertension obstructive sleep apnea morbid obesity status post bariatric surgery history of TIA and breast cancer presenting for episodes of presyncope and recently a syncope.  The syncope was yesterday.  She had been going to her work as usual and passed out.  This was the first time she lost consciousness.  She does not remember for how long it lasted but when she woke up she worked up rather immediately.  Unclear whether there were any convulsive events.  No loss of bowel or bladder or tongue bites.  Brought to [facility] for further care.  Neurology was consulted along with cardiology for workup of syncope..  Past Medical History  Medical History[1]  Surgical History  Surgical History[2]  Social History  Social History[3]  Allergies  Amoxicillin; Diph,pertuss(acel),tet vac(pf); Tetanus immune globulin; and Tetanus and diphther. tox (pf)  Prescriptions Prior to Admission[4]    Review of Systems as noted in HPI  Neurological Exam The patient is alert and oriented x 4, extraocular movement full.  Saccades are unremarkable in velocity and amplitude.  Pursuits are unremarkable.  Vestibular ocular reflex is normal.  Visual field is full.  Pupils are reactive.  No nystagmus on primary or eccentric gaze or in  any head position with respect to gravity.  Face is symmetric bilaterally no abnormal involuntary face movements.  Facial sensations are intact bilaterally, except there is loss of sensation to touch and tactile over right eyebrow where her prosthesis is.  No abnormal involuntary facial movements appreciated.  Power intact in all 4 extremities proximally and distally.  Sensations intact to all 4 extremities to touch, tactile, sharps  bilaterally in upper and lower extremities proximally and distally.  Reflexes are symmetric 1 bilaterally in both patella, ankle, biceps triceps and wrist.  Babinski is normal.   Physical Exam  Last Recorded Vitals  Blood pressure 125/75, pulse 62, temperature 36.4 °C (97.5 °F), temperature source Temporal, resp. rate 17, SpO2 96%.    Relevant Results    [place] Coma Scale  Best Eye Response: Spontaneous  Best Verbal Response: Oriented  Best Motor Response: Follows commands  [place] Coma Scale Score: 15        I have personally reviewed the following imaging results:   Imaging  CT angio chest for pulmonary embolism  Result Date: 5/17/2025  1.No pulmonary embolism or other acute intrathoracic process. 2.Moderate coronary artery calcifications. 3.Small hiatal hernia. 4.Mild hepatic steatosis. Signed by Junior Sosa MD    XR chest 2 views  Result Date: 5/16/2025  No acute pathologic findings are identified on this exam.   MACRO: none   Signed by: Stevan Gillis 5/16/2025 4:17 PM Dictation workstation:   [id number]    CT lumbar spine wo IV contrast  Result Date: 5/16/2025  Degenerative changes as detailed above. No acute fracture or subluxation.   MACRO: None   Signed by: Joseph Schoenberger 5/16/2025 4:14 PM Dictation workstation:   [id number]    CT cervical spine wo IV contrast  Result Date: 5/16/2025  No evidence for an acute fracture or subluxation of the cervical spine.   MACRO: None   Signed by: Joseph Schoenberger 5/16/2025 4:07 PM Dictation workstation:   [id number]    CT head wo IV contrast  Result Date: 5/16/2025  Progression of more superficial white matter demyelination as detailed above. The significance of this is uncertain. It is possible that brain MRI would further clarify.   Small right parasagittal meningioma which is densely calcified, results in no mass effect, and a stable in size in appearance to the prior.   No evidence of intracranial hemorrhage or displaced skull fracture.   MACRO: None    Signed by: Joseph Schoenberger 5/16/2025 3:59 PM Dictation workstation:   [id number]      Cardiology, Vascular, and Other Imaging  Transthoracic Echo (TTE) Limited  Result Date: 5/17/2025   [facility], [address] [place] [address]              Tel [phone] and Fax [phone] TRANSTHORACIC ECHOCARDIOGRAM REPORT  Patient Name:       PADILLA COKER    Reading Physician:   [id number] Eugene Parker MD Study Date:         5/17/2025           Ordering Provider:   [id number] JAMESON VACA MRN/PID:            [id number]            Fellow: Accession#:         [id number]        Nurse: Date of Birth/Age:  1960 / 64     Sonographer:         Barbara Xavier RDCS                     years Gender assigned at  F                   Additional Staff: Birth: Height:             165.10 cm           Admit Date:          5/16/2025 Weight:             95.25 kg            Admission Status:    Observation -                                                              Priority discharge BSA / BMI:          2.02 m2 / 34.95     Encounter#:          [id number]                     kg/m2 Blood Pressure:     105/73 mmHg         Department Location: [facility] Non                                                              Invasive Study Type:    TRANSTHORACIC ECHO (TTE) LIMITED Diagnosis/ICD: Syncope-R55 Indication:    Syncope CPT Code:      Echo Limited-78339 Patient History: Pertinent History: TIA, HTN, Cardiomyopathy, LE Edema and COPD. Study Detail: The following Echo studies were performed: 2D, M-Mode, Doppler and               color flow. Agitated saline used as a contrast agent for               intraseptal flow evaluation.  PHYSICIAN INTERPRETATION: Left Ventricle: Left ventricular ejection fraction is mildly decreased, by visual estimate at 50%. There is global hypokinesis of the left  ventricle with minor regional variations. The left ventricular cavity size is normal. There is normal septal and normal posterior left ventricular wall thickness. Left ventricular diastolic filling was not assessed. Left Atrium: The left atrial size was not assessed. A bubble study using agitated saline was performed. Bubble study is negative. Right Ventricle: The right ventricle was not assessed. Right ventricular systolic function not assessed. Right Atrium: The right atrial size was not assessed. Aortic Valve: The aortic valve is trileaflet. There is trace aortic valve regurgitation. Mitral Valve: The mitral valve is normal in structure. There is trace to mild mitral valve regurgitation. The mitral regurgitant orifice area is 9 mm2. The mitral regurgitant volume is 16.07 ml. Tricuspid Valve: The tricuspid valve is structurally normal. There is mild tricuspid regurgitation. Pulmonic Valve: The pulmonic valve is structurally normal. There is no indication of pulmonic valve regurgitation. Pericardium: There is no pericardial effusion noted. Aorta: The aortic root was not assessed. Systemic Veins: The inferior vena cava appears normal in size. In comparison to the previous echocardiogram(s): Compared with study dated 5/12/2025, the findings are similar.  CONCLUSIONS:  1. Left ventricular ejection fraction is mildly decreased, by visual estimate at 50%.  2. There is global hypokinesis of the left ventricle with minor regional variations. QUANTITATIVE DATA SUMMARY:  2D MEASUREMENTS:          Normal Ranges: IVSd:            0.78 cm  (0.6-1.1cm) LVPWd:           0.92 cm  (0.6-1.1cm) LVIDd:           5.64 cm  (3.9-5.9cm) LVIDs:           4.27 cm LV Mass Index:   89 g/m2 LVEDV Index:     66 ml/m2 LV % FS          24.2 %  LV SYSTOLIC FUNCTION:                      Normal Ranges: EF-A4C View:    48 % (>=55%) EF-A2C View:    59 % EF-Biplane:     54 % EF-Visual:      50 % LV EF Reported: 50 %  LV DIASTOLIC FUNCTION:              Normal Ranges: MV Peak E:             0.73 m/s    (0.7-1.2 m/s) MV Peak A:             0.81 m/s    (0.42-0.7 m/s) E/A Ratio:             0.91        (1.0-2.2) MV e'                  0.085 m/s   (>8.0) MV lateral e'          0.10 m/s MV medial e'           0.07 m/s MV A Dur:              127.50 msec E/e' Ratio:            8.64        (<8.0) a'                     0.14 m/s PulmV Sys Eduardo:         52.17 cm/s PulmV George Eduardo:        34.45 cm/s PulmV S/D Eduardo:         1.51 PulmV A Revs Eduardo:      24.09 cm/s PulmV A Revs Dur:      100.86 msec  MITRAL VALVE:          Normal Ranges: MV DT:        162 msec (150-240msec)  MITRAL INSUFFICIENCY:             Normal Ranges: PISA Radius:          0.5 cm MR VTI:               173.92 cm MR Vmax:              478.49 cm/s MR Alias Eduardo:         30.0 cm/s MR Volume:            16.07 ml MR Flow Rt:           44.20 ml/s MR EROA:              9 mm2  TRICUSPID VALVE/RVSP:          Normal Ranges: Peak TR Velocity:     2.23 m/s Est. RA Pressure:     3 mmHg RV Syst Pressure:     23 mmHg  (< 30mmHg) IVC Diam:             1.55 cm  PULMONARY VEINS: PulmV A Revs Dur: 100.86 msec PulmV A Revs Eduardo: 24.09 cm/s PulmV George Eduardo:   34.45 cm/s PulmV S/D Eduardo:    1.51 PulmV Sys Eduardo:    52.17 cm/s  [id number] Eugene Parker MD Electronically signed on 5/17/2025 at 5:30:15 PM  ** Final **     Transthoracic Echo (TTE) Complete  Result Date: 5/14/2025   [facility], [address] [place] [address]               Tel [phone] and Fax [phone] TRANSTHORACIC ECHOCARDIOGRAM REPORT  Patient Name:       PADILLA Zelaya Physician:    Ev Penaloza MD Study Date:         5/12/2025           Ordering Provider:    Ev PENALOZA MRN/PID:            [id number]            Fellow: Accession#:         [id number]        Nurse: Date of Birth/Age:  1960 /  64     Sonographer:          Apoorva Ellison                     years                                     RDCS Gender assigned at  F                   Additional Staff: Birth: Height:             165.10 cm           Admit Date:           5/12/2025 Weight:             95.71 kg            Admission Status:     Outpatient BSA / BMI:          2.02 m2 / 35.11     Encounter#:           [id number]                     kg/m2 Blood Pressure:     109/53 mmHg         Department Location:  [facility] Non                                                               Invasive Study Type:    TRANSTHORACIC ECHO (TTE) COMPLETE Diagnosis/ICD: Other cardiomyopathies-I42.8 Indication:    NICM, dyspnea CPT Code:      Echo Complete w Full Doppler-[id number] Patient History: Smoker:           Former. Pertinent         Cardiomyopathy, Dyspnea, HTN, TIA, Hyperlipidemia, COPD and History:          NICM. Study Detail: The following Echo studies were performed: 2D, M-Mode, Doppler and               color flow. The patient was awake.  PHYSICIAN INTERPRETATION: Left Ventricle: Left ventricular ejection fraction is mildly decreased, calculated by Meehan's biplane at 48%. There is global hypokinesis of the left ventricle with minor regional variations. The left ventricular cavity size is mildly dilated. There is mildly increased septal and mildly increased posterior left ventricular wall thickness. Spectral Doppler shows a Grade I (impaired relaxation pattern) of left ventricular diastolic filling with normal left atrial filling pressure. Left Atrium: The left atrial size is normal. Right Ventricle: The right ventricle is normal in size. There is normal right ventricular global systolic function. Right Atrium: The right atrium is normal in size. Aortic Valve: The aortic valve is trileaflet. There is mild aortic valve cusp calcification. The aortic valve dimensionless index is 0.67. There is no evidence of aortic valve regurgitation. The peak  instantaneous gradient of the aortic valve is 12 mmHg. The mean gradient of the aortic valve is 7 mmHg. Mitral Valve: The mitral valve is mildly thickened. There is mild mitral valve regurgitation. Tricuspid Valve: The tricuspid valve is structurally normal. There is trace to mild tricuspid regurgitation. The Doppler estimated RVSP is within normal limits at 18.8 mmHg. Pulmonic Valve: The pulmonic valve is structurally normal. There is physiologic pulmonic valve regurgitation. Pericardium: Trivial pericardial effusion. Aorta: The aortic root is normal. The aortic root is at the upper limits of normal size. In comparison to the previous echocardiogram(s): Compared with study dated 6/7/2024, LVEF has improved.  CONCLUSIONS:  1. Left ventricular ejection fraction is mildly decreased, calculated by Meehan's biplane at 48%.  2. There is global hypokinesis of the left ventricle with minor regional variations.  3. Spectral Doppler shows a Grade I (impaired relaxation pattern) of left ventricular diastolic filling with normal left atrial filling pressure.  4. Left ventricular cavity size is mildly dilated.  5. There is normal right ventricular global systolic function.  6. Right ventricular systolic pressure is within normal limits. QUANTITATIVE DATA SUMMARY:  2D MEASUREMENTS:          Normal Ranges: Ao Root d:       3.10 cm  (2.0-3.7cm) IVSd:            1.03 cm  (0.6-1.1cm) LVPWd:           1.12 cm  (0.6-1.1cm) LVIDd:           5.31 cm  (3.9-5.9cm) LVIDs:           4.53 cm LV Mass Index:   109 g/m2 LVEDV Index:     65 ml/m2 LV % FS          14.7 %  LEFT ATRIUM:                  Normal Ranges: LA Vol A4C:        37.9 ml    (22+/-6mL/m2) LA Vol A2C:        44.2 ml LA Vol BP:         41.7 ml LA Vol Index A4C:  18.7ml/m2 LA Vol Index A2C:  21.8 ml/m2 LA Vol Index BP:   20.6 ml/m2 LA Area A4C:       14.9 cm2 LA Area A2C:       15.8 cm2 LA Major Axis A4C: 5.0 cm LA Major Axis A2C: 4.8 cm LA Volume Index:   19.8 ml/m2 LA Vol  A4C:        35.5 ml LA Vol A2C:        43.5 ml LA Vol Index BSA:  19.5 ml/m2  RIGHT ATRIUM:          Normal Ranges: RA Area A4C:  14.3 cm2  M-MODE MEASUREMENTS:         Normal Ranges: Ao Root:             3.30 cm (2.0-3.7cm) AoV Exc:             1.90 cm (1.5-2.5cm) LAs:                 3.60 cm (2.7-4.0cm)  AORTA MEASUREMENTS:         Normal Ranges: AoV Exc:            1.90 cm (1.5-2.5cm) Ao Sinus, d:        3.10 cm (2.1-3.5cm) Asc Ao, d:          3.60 cm (2.1-3.4cm)  LV SYSTOLIC FUNCTION:                      Normal Ranges: EF-A4C View:    53 % (>=55%) EF-A2C View:    45 % EF-Biplane:     48 % LV EF Reported: 48 %  LV DIASTOLIC FUNCTION:             Normal Ranges: MV Peak E:             0.67 m/s    (0.7-1.2 m/s) MV Peak A:             0.92 m/s    (0.42-0.7 m/s) E/A Ratio:             0.73        (1.0-2.2) MV e'                  0.075 m/s   (>8.0) MV lateral e'          0.09 m/s MV medial e'           0.06 m/s MV A Dur:              151.00 msec E/e' Ratio:            8.97        (<8.0) PulmV Sys Eduardo:         42.40 cm/s PulmV George Eduardo:        28.60 cm/s PulmV S/D Eduardo:         1.50 PulmV A Revs Eduardo:      26.20 cm/s PulmV A Revs Dur:      111.00 msec  MITRAL VALVE:          Normal Ranges: MV DT:        253 msec (150-240msec)  AORTIC VALVE:                      Normal Ranges: AoV Vmax:                1.71 m/s  (<=1.7m/s) AoV Peak P.7 mmHg (<20mmHg) AoV Mean P.0 mmHg  (1.7-11.5mmHg) LVOT Max Eduardo:            1.17 m/s  (<=1.1m/s) AoV VTI:                 36.90 cm  (18-25cm) LVOT VTI:                24.70 cm LVOT Diameter:           2.00 cm   (1.8-2.4cm) AoV Area, VTI:           2.10 cm2  (2.5-5.5cm2) AoV Area,Vmax:           2.15 cm2  (2.5-4.5cm2) AoV Dimensionless Index: 0.67  RIGHT VENTRICLE: RV Basal 2.51 cm RV Mid   1.93 cm RV Major 8.1 cm TAPSE:   20.3 mm RV s'    0.11 m/s  TRICUSPID VALVE/RVSP:          Normal Ranges: Peak TR Velocity:     1.99 m/s RV Syst Pressure:     19 mmHg  (<  30mmHg) IVC Diam:             1.40 cm  PULMONIC VALVE:          Normal Ranges: PV Max Eduardo:     0.8 m/s  (0.6-0.9m/s) PV Max P.8 mmHg  PULMONARY VEINS: PulmV A Revs Dur: 111.00 msec PulmV A Revs Eduardo: 26.20 cm/s PulmV George Eduardo:   28.60 cm/s PulmV S/D Eduardo:    1.50 PulmV Sys Eduardo:    42.40 cm/s  [id number] Amish Burroughs MD Electronically signed on 2025 at 5:41:40 PM  ** Final **        Assessment/Plan   The patient is a 64-year-old woman with history of vascular risk factors on chemotherapy for cancer presenting for syncope.  Had other spells which were presyncope and this time for central loss of consciousness.  No compelling evidence for seizure however unclear semiology at the time of event since she lost consciousness.  MRI is not possible due to implant that she has.  Repeat head CT with contrast in 12 hours from now.  Routine EEG if EEG and CT is unremarkable no additional neurological workup outpatient neurology follow-up.    I spent 60 minutes in the professional and overall care of this patient.      Aasef G Shaikh, MD PhD       [1]   Past Medical History:  Diagnosis Date    Bariatric surgery status 2017    S/P bariatric surgery    Breast cancer     Encounter for preprocedural laboratory examination 2019    Blood tests prior to treatment or procedure    Encounter for screening for malignant neoplasm of colon 2018    Encounter for screening colonoscopy    Hyperlipidemia     Hypertension     Morbid (severe) obesity due to excess calories (Multi) 2017    Morbid obesity with BMI of 45.0-49.9, adult    Nausea with vomiting, unspecified 2017    Post-operative nausea and vomiting    Nausea with vomiting, unspecified 2016    Post-operative nausea and vomiting    Other acute postprocedural pain 2017    Acute post-operative pain    Other conditions influencing health status     Breast Cancer    Personal history of nicotine dependence 2018    History of nicotine  dependence    Stroke (Multi)     Vision loss    [2]   Past Surgical History:  Procedure Laterality Date    CARDIAC CATHETERIZATION N/A 7/19/2024    Procedure: Left Heart Cath;  Surgeon: Anthony Curry MD;  Location: [facility] Cardiac Cath Lab;  Service: Cardiovascular;  Laterality: N/A;    CATARACT EXTRACTION EXTRACAPSULAR W/ INTRAOCULAR LENS IMPLANTATION Bilateral     GALLBLADDER SURGERY  04/24/2013    Gallbladder Surgery    KNEE SURGERY  04/24/2013    Knee Surgery Left    LASIK      MASTECTOMY  05/24/2013    Breast Surgery Mastectomy    MASTECTOMY, RADICAL Left     MR HEAD ANGIO WO IV CONTRAST  09/19/2019    MR HEAD ANGIO WO IV CONTRAST [facility]    OTHER SURGICAL HISTORY  01/09/2017    Gastric Surgery For Morbid Obesity Laparoscopic Longitudinal Gastrectomy    OTHER SURGICAL HISTORY  02/29/2016    Breast Surgery Reconstruction   [3]   Social History  Tobacco Use    Smoking status: Former     Current packs/day: 1.00     Average packs/day: 1 pack/day for 45.4 years (45.4 ttl pk-yrs)     Types: Cigarettes     Start date: 1/1/1980     Passive exposure: Current    Smokeless tobacco: Never   Vaping Use    Vaping status: Never Used   Substance Use Topics    Alcohol use: Not Currently     Comment: SOCIALLY    Drug use: Never   [4]   Medications Prior to Admission   Medication Sig Dispense Refill Last Dose/Taking    empagliflozin (Jardiance) 10 mg tablet Take 1 tablet (10 mg) by mouth once daily. 90 tablet 3 5/15/2025    sacubitriL-valsartan (Entresto) 24-26 mg tablet Take 1 tablet by mouth 2 times a day. 180 tablet 3 5/15/2025    atorvastatin (Lipitor) 40 mg tablet Take 1 tablet (40 mg) by mouth once daily. (Patient not taking: Reported on 4/22/2025) 90 tablet 3     carvedilol (Coreg) 3.125 mg tablet Take 1 tablet (3.125 mg) by mouth 2 times daily (morning and late afternoon). (Patient not taking: Reported on 4/22/2025) 180 tablet 3     meclizine (Antivert) 25 mg tablet Take 1 tablet (25 mg) by mouth 3 times a  day as needed for dizziness. (Patient not taking: Reported on 4/22/2025) 90 tablet 0     nicotine (Nicoderm CQ) 21 mg/24 hr patch Place 1 patch over 24 hours on the skin once every 24 hours. (Patient not taking: Reported on 4/22/2025) 30 patch 0

## 2025-05-17 NOTE — PROGRESS NOTES
"Occupational Therapy                 Therapy Communication Note    Patient Name: Gavi Tejeda  MRN: [id number]  Department: [place] [address]  Room: [address] [place] [address]  Today's Date: 5/17/2025     Discipline: Occupational Therapy    Missed Visit: OT Missed Visit: Yes     Missed Visit Reason: Missed Visit Reason:  (No needs. Patient reports she has no acute OT needs. She is getting around ok and feels comfortable taking care of herself at d/c. She's looking forward to getting back on her \"[place]\".) OT to sign off.    Missed Time: Attempt    Comment:      "

## 2025-05-17 NOTE — CARE PLAN
The patient's goals for the shift include      The clinical goals for the shift include pt will remain hds      Problem: Pain - Adult  Goal: Verbalizes/displays adequate comfort level or baseline comfort level  Outcome: Progressing     Problem: Safety - Adult  Goal: Free from fall injury  Outcome: Progressing     Problem: Discharge Planning  Goal: Discharge to home or other facility with appropriate resources  Outcome: Progressing     Problem: Chronic Conditions and Co-morbidities  Goal: Patient's chronic conditions and co-morbidity symptoms are monitored and maintained or improved  Outcome: Progressing     Problem: Nutrition  Goal: Nutrient intake appropriate for maintaining nutritional needs  Outcome: Progressing     Problem: Pain  Goal: Walks with improved pain control throughout the shift  Outcome: Progressing  Goal: Performs ADL's with improved pain control throughout shift  Outcome: Progressing  Goal: Free from opioid side effects throughout the shift  Outcome: Progressing

## 2025-05-18 ENCOUNTER — APPOINTMENT (OUTPATIENT)
Dept: RADIOLOGY | Facility: HOSPITAL | Age: 65
End: 2025-05-18
Payer: MEDICARE

## 2025-05-18 VITALS
TEMPERATURE: 97.7 F | HEART RATE: 68 BPM | DIASTOLIC BLOOD PRESSURE: 61 MMHG | RESPIRATION RATE: 18 BRPM | WEIGHT: 210 LBS | OXYGEN SATURATION: 96 % | SYSTOLIC BLOOD PRESSURE: 116 MMHG | BODY MASS INDEX: 34.99 KG/M2 | HEIGHT: 65 IN

## 2025-05-18 PROCEDURE — 70460 CT HEAD/BRAIN W/DYE: CPT | Performed by: RADIOLOGY

## 2025-05-18 PROCEDURE — 70460 CT HEAD/BRAIN W/DYE: CPT

## 2025-05-18 PROCEDURE — 2500000001 HC RX 250 WO HCPCS SELF ADMINISTERED DRUGS (ALT 637 FOR MEDICARE OP): Performed by: INTERNAL MEDICINE

## 2025-05-18 PROCEDURE — 2500000005 HC RX 250 GENERAL PHARMACY W/O HCPCS: Performed by: INTERNAL MEDICINE

## 2025-05-18 PROCEDURE — 2550000001 HC RX 255 CONTRASTS: Performed by: INTERNAL MEDICINE

## 2025-05-18 PROCEDURE — 96372 THER/PROPH/DIAG INJ SC/IM: CPT | Mod: 59 | Performed by: INTERNAL MEDICINE

## 2025-05-18 PROCEDURE — G0378 HOSPITAL OBSERVATION PER HR: HCPCS

## 2025-05-18 PROCEDURE — 99233 SBSQ HOSP IP/OBS HIGH 50: CPT | Performed by: INTERNAL MEDICINE

## 2025-05-18 PROCEDURE — 2500000004 HC RX 250 GENERAL PHARMACY W/ HCPCS (ALT 636 FOR OP/ED): Mod: JZ | Performed by: INTERNAL MEDICINE

## 2025-05-18 RX ADMIN — CARVEDILOL 3.12 MG: 3.12 TABLET, FILM COATED ORAL at 16:18

## 2025-05-18 RX ADMIN — CARVEDILOL 3.12 MG: 3.12 TABLET, FILM COATED ORAL at 09:21

## 2025-05-18 RX ADMIN — SACUBITRIL AND VALSARTAN 1 TABLET: 24; 26 TABLET, FILM COATED ORAL at 09:21

## 2025-05-18 RX ADMIN — ENOXAPARIN SODIUM 40 MG: 40 INJECTION SUBCUTANEOUS at 21:45

## 2025-05-18 RX ADMIN — DOCUSATE SODIUM 100 MG: 100 CAPSULE, LIQUID FILLED ORAL at 09:21

## 2025-05-18 RX ADMIN — ATORVASTATIN CALCIUM 40 MG: 40 TABLET, FILM COATED ORAL at 09:22

## 2025-05-18 RX ADMIN — Medication 3 MG: at 20:07

## 2025-05-18 RX ADMIN — IOHEXOL 75 ML: 350 INJECTION, SOLUTION INTRAVENOUS at 16:41

## 2025-05-18 RX ADMIN — TRAMADOL HYDROCHLORIDE 50 MG: 50 TABLET, COATED ORAL at 20:07

## 2025-05-18 RX ADMIN — SACUBITRIL AND VALSARTAN 1 TABLET: 24; 26 TABLET, FILM COATED ORAL at 20:07

## 2025-05-18 RX ADMIN — EMPAGLIFLOZIN 10 MG: 10 TABLET, FILM COATED ORAL at 09:21

## 2025-05-18 RX ADMIN — TRAMADOL HYDROCHLORIDE 50 MG: 50 TABLET, COATED ORAL at 13:28

## 2025-05-18 ASSESSMENT — COGNITIVE AND FUNCTIONAL STATUS - GENERAL
DAILY ACTIVITIY SCORE: 24
MOBILITY SCORE: 24

## 2025-05-18 ASSESSMENT — PAIN - FUNCTIONAL ASSESSMENT
PAIN_FUNCTIONAL_ASSESSMENT: 0-10

## 2025-05-18 ASSESSMENT — PAIN SCALES - GENERAL
PAINLEVEL_OUTOF10: 7
PAINLEVEL_OUTOF10: 0 - NO PAIN
PAINLEVEL_OUTOF10: 0 - NO PAIN
PAINLEVEL_OUTOF10: 3
PAINLEVEL_OUTOF10: 6
PAINLEVEL_OUTOF10: 3

## 2025-05-18 ASSESSMENT — PAIN DESCRIPTION - LOCATION
LOCATION: BACK
LOCATION: BACK

## 2025-05-18 ASSESSMENT — PAIN DESCRIPTION - ORIENTATION: ORIENTATION: LOWER

## 2025-05-18 ASSESSMENT — PAIN DESCRIPTION - DESCRIPTORS: DESCRIPTORS: ACHING

## 2025-05-18 NOTE — PROGRESS NOTES
Gavi Tejeda is a 64 y.o. female on day 0 of admission presenting with Syncope, unspecified syncope type.    Subjective   Patient is feeling better today       Objective     Physical Exam  Vitals reviewed.   Constitutional:       Appearance: Normal appearance.   HENT:      Head: Normocephalic and atraumatic.      Right Ear: Tympanic membrane, ear canal and external ear normal.      Left Ear: Tympanic membrane, ear canal and external ear normal.      Nose: Nose normal.      Mouth/Throat:      Pharynx: Oropharynx is clear.   Eyes:      Extraocular Movements: Extraocular movements intact.      Conjunctiva/sclera: Conjunctivae normal.      Pupils: Pupils are equal, round, and reactive to light.   Cardiovascular:      Rate and Rhythm: Normal rate and regular rhythm.      Pulses: Normal pulses.      Heart sounds: Normal heart sounds.   Pulmonary:      Effort: Pulmonary effort is normal.      Breath sounds: Normal breath sounds.   Abdominal:      General: Abdomen is flat. Bowel sounds are normal.      Palpations: Abdomen is soft.   Musculoskeletal:      Cervical back: Normal range of motion and neck supple.   Skin:     General: Skin is warm and dry.   Neurological:      General: No focal deficit present.      Mental Status: She is alert and oriented to person, place, and time.   Psychiatric:         Mood and Affect: Mood normal.         Last Recorded Vitals  Blood pressure 118/69, pulse 65, temperature 36.3 °C (97.3 °F), temperature source Temporal, resp. rate 18, SpO2 96%.  Intake/Output last 3 Shifts:  I/O last 3 completed shifts:  In: 240 [P.O.:240]  Out: -     Relevant Results                 No results found for this or any previous visit (from the past 24 hours).  CT head w IV contrast  Result Date: 5/18/2025  Interpreted By:  Vani Marie, STUDY: CT HEAD W IV CONTRAST;  5/18/2025 4:54 pm   INDICATION: Signs/Symptoms:syncope.     COMPARISON: CT head without contrast 05/16/2025.   ACCESSION NUMBER(S):  [id number]   ORDERING CLINICIAN: BRET FARMER   TECHNIQUE: Axial CT scan of head was performed. Angled reformats in brain and bone windows were generated. The images were reviewed in bone, brain, blood and soft tissue windows. 75 mL Omnipaque 350 injected intravenously.   FINDINGS: CSF Spaces: The ventricles, sulci and basal cisterns are within normal limits. There is a calcified extra-axial enhancing mass along the anterior falx on the right measuring 1.3 x 1.3 x 1.1 cm which likely represents a meningioma. There is mild mass effect upon the adjacent brain parenchyma. No edema is noted within the adjacent brain parenchyma.   Parenchyma: No abnormal parenchymal enhancement. Patchy nonspecific white matter hypodensity may represent microangiopathy. The grey-white differentiation is intact. There is no mass effect or midline shift.  There is no intracranial hemorrhage.   Calvarium: The calvarium is unremarkable. There is plate and screw fixation of the lateral orbital wall on the right.   Paranasal sinuses and mastoids: Visualized paranasal sinuses and mastoids are predominantly clear.       There is a calcified extra-axial enhancing mass along the anterior falx on the right measuring 1.3 cm which likely represents a meningioma. There is mild mass effect upon the adjacent brain parenchyma. No edema is noted within the adjacent brain parenchyma.   No abnormal parenchymal enhancement.   Patchy nonspecific white matter hypodensity may represent microangiopathy.   MACRO: None   Signed by: Vani Marie 5/18/2025 5:46 PM Dictation workstation:   [id number]    Transthoracic Echo (TTE) Limited  Result Date: 5/17/2025   [facility], [address] [place] [address]              Tel [phone] and Fax [phone] TRANSTHORACIC ECHOCARDIOGRAM REPORT  Patient Name:       PADILLA COKER    Reading Physician:   [id number] uEgene Parker MD Study Date:          5/17/2025           Ordering Provider:   [id number] JAMESON VACA MRN/PID:            [id number]            Fellow: Accession#:         [id number]        Nurse: Date of Birth/Age:  1960 / 64     Sonographer:         Barbara Xavier RDCS                     years Gender assigned at  F                   Additional Staff: Birth: Height:             165.10 cm           Admit Date:          5/16/2025 Weight:             95.25 kg            Admission Status:    Observation -                                                              Priority discharge BSA / BMI:          2.02 m2 / 34.95     Encounter#:          [id number]                     kg/m2 Blood Pressure:     105/73 mmHg         Department Location: [facility] Non                                                              Invasive Study Type:    TRANSTHORACIC ECHO (TTE) LIMITED Diagnosis/ICD: Syncope-R55 Indication:    Syncope CPT Code:      Echo Limited-52434 Patient History: Pertinent History: TIA, HTN, Cardiomyopathy, LE Edema and COPD. Study Detail: The following Echo studies were performed: 2D, M-Mode, Doppler and               color flow. Agitated saline used as a contrast agent for               intraseptal flow evaluation.  PHYSICIAN INTERPRETATION: Left Ventricle: Left ventricular ejection fraction is mildly decreased, by visual estimate at 50%. There is global hypokinesis of the left ventricle with minor regional variations. The left ventricular cavity size is normal. There is normal septal and normal posterior left ventricular wall thickness. Left ventricular diastolic filling was not assessed. Left Atrium: The left atrial size was not assessed. A bubble study using agitated saline was performed. Bubble study is negative. Right Ventricle: The right ventricle was not assessed. Right ventricular systolic function not assessed. Right Atrium: The right atrial size was not assessed. Aortic  Valve: The aortic valve is trileaflet. There is trace aortic valve regurgitation. Mitral Valve: The mitral valve is normal in structure. There is trace to mild mitral valve regurgitation. The mitral regurgitant orifice area is 9 mm2. The mitral regurgitant volume is 16.07 ml. Tricuspid Valve: The tricuspid valve is structurally normal. There is mild tricuspid regurgitation. Pulmonic Valve: The pulmonic valve is structurally normal. There is no indication of pulmonic valve regurgitation. Pericardium: There is no pericardial effusion noted. Aorta: The aortic root was not assessed. Systemic Veins: The inferior vena cava appears normal in size. In comparison to the previous echocardiogram(s): Compared with study dated 5/12/2025, the findings are similar.  CONCLUSIONS:  1. Left ventricular ejection fraction is mildly decreased, by visual estimate at 50%.  2. There is global hypokinesis of the left ventricle with minor regional variations. QUANTITATIVE DATA SUMMARY:  2D MEASUREMENTS:          Normal Ranges: IVSd:            0.78 cm  (0.6-1.1cm) LVPWd:           0.92 cm  (0.6-1.1cm) LVIDd:           5.64 cm  (3.9-5.9cm) LVIDs:           4.27 cm LV Mass Index:   89 g/m2 LVEDV Index:     66 ml/m2 LV % FS          24.2 %  LV SYSTOLIC FUNCTION:                      Normal Ranges: EF-A4C View:    48 % (>=55%) EF-A2C View:    59 % EF-Biplane:     54 % EF-Visual:      50 % LV EF Reported: 50 %  LV DIASTOLIC FUNCTION:             Normal Ranges: MV Peak E:             0.73 m/s    (0.7-1.2 m/s) MV Peak A:             0.81 m/s    (0.42-0.7 m/s) E/A Ratio:             0.91        (1.0-2.2) MV e'                  0.085 m/s   (>8.0) MV lateral e'          0.10 m/s MV medial e'           0.07 m/s MV A Dur:              127.50 msec E/e' Ratio:            8.64        (<8.0) a'                     0.14 m/s PulmV Sys Eduardo:         52.17 cm/s PulmV George Eduardo:        34.45 cm/s PulmV S/D Eduardo:         1.51 PulmV A Revs Eduardo:      24.09 cm/s PulmV  A Revs Dur:      100.86 msec  MITRAL VALVE:          Normal Ranges: MV DT:        162 msec (150-240msec)  MITRAL INSUFFICIENCY:             Normal Ranges: PISA Radius:          0.5 cm MR VTI:               173.92 cm MR Vmax:              478.49 cm/s MR Alias Eduardo:         30.0 cm/s MR Volume:            16.07 ml MR Flow Rt:           44.20 ml/s MR EROA:              9 mm2  TRICUSPID VALVE/RVSP:          Normal Ranges: Peak TR Velocity:     2.23 m/s Est. RA Pressure:     3 mmHg RV Syst Pressure:     23 mmHg  (< 30mmHg) IVC Diam:             1.55 cm  PULMONARY VEINS: PulmV A Revs Dur: 100.86 msec PulmV A Revs Eduardo: 24.09 cm/s PulmV George Eduardo:   34.45 cm/s PulmV S/D Eduardo:    1.51 PulmV Sys Eduardo:    52.17 cm/s  [id number] Eugene Parker MD Electronically signed on 5/17/2025 at 5:30:15 PM  ** Final **     CT angio chest for pulmonary embolism  Result Date: 5/17/2025  STUDY: CT Angiogram of the Chest; 5/17/2025 3:51 PM INDICATION: Elevated d-dimer.  Syncope. COMPARISON: None Available. ACCESSION NUMBER(S): [id number] ORDERING CLINICIAN: JAMESON VACA TECHNIQUE:  CTA of the chest was performed with intravenous contrast. Images are reviewed and processed at a workstation according to the CT angiogram protocol with 3-D and/or MIP post processing imaging generated.  Omnipaque 350 90 mL was administered intravenously.  Automated mA/kV exposure control was utilized and patient examination was performed in strict accordance with principles of ALARA. FINDINGS: Pulmonary arteries are adequately opacified without acute or chronic filling defects.  The thoracic aorta is normal in course and caliber without dissection or aneurysm. The heart is normal in size without pericardial effusion..  Moderate coronary calcifications.  Thoracic lymph nodes are not enlarged. There is no pleural effusion, pleural thickening, or pneumothorax. The airways are patent. 5 mm nodule along the minor fissure likely an intrapulmonary lymph node. Postsurgical  changes of sleeve gastrectomy.  Small hiatal hernia. Mild fatty infiltration of the liver.  Gallbladder is absent There are no acute fractures.  No suspicious bony lesions.    1.No pulmonary embolism or other acute intrathoracic process. 2.Moderate coronary artery calcifications. 3.Small hiatal hernia. 4.Mild hepatic steatosis. Signed by Junior Sosa MD                 Assessment & Plan  Syncope, unspecified syncope type    Scalp laceration    Acute midline low back pain without sciatica    Bariatric surgery status    Cardiomyopathy    COPD (chronic obstructive pulmonary disease) (Multi)    Meningioma (Multi)    Discussed with neurology wants to review the ECG and repeat CT head echo shows improvement in ejection fraction  Patient has ziopatch  No pulmonary embolism on CAT scan    I spent  minutes in the professional and overall care of this patient.      Ashley Bradley MD

## 2025-05-18 NOTE — CARE PLAN
The patient's goals for the shift include      The clinical goals for the shift include pt will remain safe and hds      Problem: Pain - Adult  Goal: Verbalizes/displays adequate comfort level or baseline comfort level  Outcome: Progressing     Problem: Safety - Adult  Goal: Free from fall injury  Outcome: Progressing     Problem: Discharge Planning  Goal: Discharge to home or other facility with appropriate resources  Outcome: Progressing     Problem: Chronic Conditions and Co-morbidities  Goal: Patient's chronic conditions and co-morbidity symptoms are monitored and maintained or improved  Outcome: Progressing     Problem: Nutrition  Goal: Nutrient intake appropriate for maintaining nutritional needs  Outcome: Progressing     Problem: Pain  Goal: Walks with improved pain control throughout the shift  Outcome: Progressing  Goal: Performs ADL's with improved pain control throughout shift  Outcome: Progressing  Goal: Free from opioid side effects throughout the shift  Outcome: Progressing

## 2025-05-18 NOTE — CARE PLAN
The clinical goals for the shift include Pt will remain free from falls.    Problem: Pain - Adult  Goal: Verbalizes/displays adequate comfort level or baseline comfort level  Outcome: Progressing     Problem: Safety - Adult  Goal: Free from fall injury  Outcome: Met     Problem: Discharge Planning  Goal: Discharge to home or other facility with appropriate resources  Outcome: Progressing     Problem: Pain  Goal: Walks with improved pain control throughout the shift  Outcome: Met  Goal: Free from opioid side effects throughout the shift  Outcome: Met           used

## 2025-05-19 ENCOUNTER — APPOINTMENT (OUTPATIENT)
Dept: NEUROLOGY | Facility: HOSPITAL | Age: 65
End: 2025-05-19
Payer: MEDICARE

## 2025-05-19 VITALS
OXYGEN SATURATION: 98 % | TEMPERATURE: 96.6 F | BODY MASS INDEX: 34.99 KG/M2 | SYSTOLIC BLOOD PRESSURE: 144 MMHG | DIASTOLIC BLOOD PRESSURE: 89 MMHG | HEIGHT: 65 IN | HEART RATE: 72 BPM | RESPIRATION RATE: 18 BRPM | WEIGHT: 210 LBS

## 2025-05-19 LAB
ATRIAL RATE: 71 BPM
P AXIS: 49 DEGREES
P OFFSET: 205 MS
P ONSET: 152 MS
PR INTERVAL: 138 MS
Q ONSET: 221 MS
QRS COUNT: 12 BEATS
QRS DURATION: 82 MS
QT INTERVAL: 370 MS
QTC CALCULATION(BAZETT): 402 MS
QTC FREDERICIA: 391 MS
R AXIS: 7 DEGREES
T AXIS: 236 DEGREES
T OFFSET: 406 MS
VENTRICULAR RATE: 71 BPM

## 2025-05-19 PROCEDURE — 2500000001 HC RX 250 WO HCPCS SELF ADMINISTERED DRUGS (ALT 637 FOR MEDICARE OP): Performed by: INTERNAL MEDICINE

## 2025-05-19 PROCEDURE — 99238 HOSP IP/OBS DSCHRG MGMT 30/<: CPT | Performed by: INTERNAL MEDICINE

## 2025-05-19 PROCEDURE — 95819 EEG AWAKE AND ASLEEP: CPT | Performed by: PSYCHIATRY & NEUROLOGY

## 2025-05-19 PROCEDURE — 95819 EEG AWAKE AND ASLEEP: CPT

## 2025-05-19 PROCEDURE — G0378 HOSPITAL OBSERVATION PER HR: HCPCS

## 2025-05-19 RX ADMIN — SACUBITRIL AND VALSARTAN 1 TABLET: 24; 26 TABLET, FILM COATED ORAL at 09:03

## 2025-05-19 RX ADMIN — CARVEDILOL 3.12 MG: 3.12 TABLET, FILM COATED ORAL at 09:03

## 2025-05-19 RX ADMIN — ATORVASTATIN CALCIUM 40 MG: 40 TABLET, FILM COATED ORAL at 09:03

## 2025-05-19 RX ADMIN — EMPAGLIFLOZIN 10 MG: 10 TABLET, FILM COATED ORAL at 09:03

## 2025-05-19 ASSESSMENT — COGNITIVE AND FUNCTIONAL STATUS - GENERAL
DAILY ACTIVITIY SCORE: 24
MOBILITY SCORE: 24

## 2025-05-19 ASSESSMENT — PAIN - FUNCTIONAL ASSESSMENT: PAIN_FUNCTIONAL_ASSESSMENT: 0-10

## 2025-05-19 ASSESSMENT — PAIN SCALES - GENERAL: PAINLEVEL_OUTOF10: 0 - NO PAIN

## 2025-05-19 NOTE — SIGNIFICANT EVENT
Evaluated patient today, she reports no chest pain, dyspnea, dizziness, lightheadedness or syncopal events since admission. Was not on telemetry during evaluation.    Horacio Zhang ([place]) Device Rep # [phone] for patient holter monitoring. He uploaded device information into Epic>he states upload can take 2-3 hours. Cardiology will follow results and call patient with results.     No cardiac barriers to discharge  Cardiology will sign off  Please call with questions

## 2025-05-19 NOTE — SIGNIFICANT EVENT
RT attempt to speak with patient regarding COPD Education.  Pt. Was not interested and refused to talk with RT.

## 2025-05-19 NOTE — NURSING NOTE

## 2025-05-19 NOTE — PROGRESS NOTES
Pharmacy Medication History     Source of Information: Per patient    Additional concerns with the patient's PTA list.   Hasn't been taking lipitor but needs to be and to restart  Notified Provider via [facility]iku : No    The following updates were made to the Prior to Admission medication list:     Medications ADDED:   Multivitamin  Medications CHANGED:  N/a  Medications REMOVED:   N/a  Medications NOT TAKING:   Coreg   Nicotine patch     Allergy reviewed : Yes    Meds 2 Beds : Yes    Outpatient pharmacy confirmed and updated in chart : Yes    Pharmacy name: [facility]    The list below reflectives the updated PTA list. Please review each medication in order reconciliation for additional clarification and justification.    Prior to Admission Medications   Prescriptions Last Dose   atorvastatin (Lipitor) 40 mg tablet Unknown   Sig: Take 1 tablet (40 mg) by mouth once daily.             empagliflozin (Jardiance) 10 mg tablet 5/15/2025   Sig: Take 1 tablet (10 mg) by mouth once daily.   meclizine (Antivert) 25 mg tablet    Sig: Take 1 tablet (25 mg) by mouth 3 times a day as needed for dizziness.             pediatric multivitamin tablet,chewable    Sig: Chew 1 tablet once daily.   sacubitriL-valsartan (Entresto) 24-26 mg tablet 5/15/2025   Sig: Take 1 tablet by mouth 2 times a day.      Facility-Administered Medications: None       The list below reflectives the updated allergy list. Please review each documented allergy for additional clarification and justification.    Allergies   Allergen Reactions    Amoxicillin Swelling    Diph,Pertuss(Acel),Tet Vac(Pf) Swelling    Tetanus Immune Globulin Swelling     severe to arm where injections was given    Tetanus And Diphther. Tox (Pf) Rash          05/19/25 at 10:08 AM - Adenike Bueno

## 2025-05-19 NOTE — PROGRESS NOTES
05/19/25 1135   Discharge Planning   Home or Post Acute Services None   Expected Discharge Disposition Home     Neuro, cardio and wound following.  Patient worked with pt/ot but has no further therapy needs.  Plan remains for patient to return home upon discharge.  Will continue to follow for discharge planning needs.

## 2025-05-20 ENCOUNTER — PATIENT OUTREACH (OUTPATIENT)
Dept: CARE COORDINATION | Facility: CLINIC | Age: 65
End: 2025-05-20
Payer: MEDICARE

## 2025-05-20 NOTE — PROGRESS NOTES
"Gavi Tejeda is a 64 y.o. female on day 0 of admission presenting with Syncope, unspecified syncope type.    Subjective   Patient is feeling better today.  Still little dizzy       Objective     Physical Exam  Vitals reviewed.   Constitutional:       Appearance: Normal appearance.   HENT:      Head: Normocephalic and atraumatic.      Right Ear: Tympanic membrane, ear canal and external ear normal.      Left Ear: Tympanic membrane, ear canal and external ear normal.      Nose: Nose normal.      Mouth/Throat:      Pharynx: Oropharynx is clear.   Eyes:      Extraocular Movements: Extraocular movements intact.      Conjunctiva/sclera: Conjunctivae normal.      Pupils: Pupils are equal, round, and reactive to light.   Cardiovascular:      Rate and Rhythm: Normal rate and regular rhythm.      Pulses: Normal pulses.      Heart sounds: Normal heart sounds.   Pulmonary:      Effort: Pulmonary effort is normal.      Breath sounds: Normal breath sounds.   Abdominal:      General: Abdomen is flat. Bowel sounds are normal.      Palpations: Abdomen is soft.   Musculoskeletal:      Cervical back: Normal range of motion and neck supple.   Skin:     General: Skin is warm and dry.      Comments: Scalp laceration on the occipital area   Neurological:      General: No focal deficit present.      Mental Status: She is alert and oriented to person, place, and time.   Psychiatric:         Mood and Affect: Mood normal.         Last Recorded Vitals  Blood pressure 144/89, pulse 72, temperature 35.9 °C (96.6 °F), temperature source Temporal, resp. rate 18, height 1.651 m (5' 5\"), weight 95.3 kg (210 lb), SpO2 98%.  Intake/Output last 3 Shifts:  No intake/output data recorded.    Relevant Results                 No results found for this or any previous visit (from the past 24 hours).  EEG  Result Date: 5/19/2025  IMPRESSION Impression This routine EEG is consistent with a mild diffuse encephalopathy. No epileptiform discharges or " lateralizing signs are seen. A full report will be scanned into the patient's chart at a later time. This report has been interpreted and electronically signed by    CT head w IV contrast  Result Date: 5/18/2025  Interpreted By:  Vani Marie, STUDY: CT HEAD W IV CONTRAST;  5/18/2025 4:54 pm   INDICATION: Signs/Symptoms:syncope.     COMPARISON: CT head without contrast 05/16/2025.   ACCESSION NUMBER(S): [id number]   ORDERING CLINICIAN: BRET FARMER   TECHNIQUE: Axial CT scan of head was performed. Angled reformats in brain and bone windows were generated. The images were reviewed in bone, brain, blood and soft tissue windows. 75 mL Omnipaque 350 injected intravenously.   FINDINGS: CSF Spaces: The ventricles, sulci and basal cisterns are within normal limits. There is a calcified extra-axial enhancing mass along the anterior falx on the right measuring 1.3 x 1.3 x 1.1 cm which likely represents a meningioma. There is mild mass effect upon the adjacent brain parenchyma. No edema is noted within the adjacent brain parenchyma.   Parenchyma: No abnormal parenchymal enhancement. Patchy nonspecific white matter hypodensity may represent microangiopathy. The grey-white differentiation is intact. There is no mass effect or midline shift.  There is no intracranial hemorrhage.   Calvarium: The calvarium is unremarkable. There is plate and screw fixation of the lateral orbital wall on the right.   Paranasal sinuses and mastoids: Visualized paranasal sinuses and mastoids are predominantly clear.       There is a calcified extra-axial enhancing mass along the anterior falx on the right measuring 1.3 cm which likely represents a meningioma. There is mild mass effect upon the adjacent brain parenchyma. No edema is noted within the adjacent brain parenchyma.   No abnormal parenchymal enhancement.   Patchy nonspecific white matter hypodensity may represent microangiopathy.   MACRO: None   Signed by: Vani Marie 5/18/2025 5:46 PM  Dictation workstation:   [id number]                 Assessment & Plan  Syncope, unspecified syncope type    Scalp laceration    Acute midline low back pain without sciatica    Bariatric surgery status    Cardiomyopathy    COPD (chronic obstructive pulmonary disease) (Multi)    Meningioma (Multi)    Repeat CT of the head  EEG to be done today   echo shows improvement in ejection fraction  Patient has ziopatch  No pulmonary embolism on CAT scan  Plan discharge once EEG done    I spent  minutes in the professional and overall care of this patient.      Ashley Bradley MD
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
## 2025-05-30 DIAGNOSIS — G45.9 TIA (TRANSIENT ISCHEMIC ATTACK): Primary | ICD-10-CM

## 2025-06-03 ENCOUNTER — TELEPHONE (OUTPATIENT)
Dept: CARDIOLOGY | Facility: HOSPITAL | Age: 65
End: 2025-06-03
Payer: MEDICARE

## 2025-06-03 DIAGNOSIS — I50.9 HEART FAILURE, UNSPECIFIED HF CHRONICITY, UNSPECIFIED HEART FAILURE TYPE: ICD-10-CM

## 2025-06-03 RX ORDER — METOPROLOL SUCCINATE 25 MG/1
12.5 TABLET, EXTENDED RELEASE ORAL DAILY
Qty: 15 TABLET | Refills: 11 | Status: SHIPPED | OUTPATIENT
Start: 2025-06-03 | End: 2026-06-03

## 2025-06-03 NOTE — TELEPHONE ENCOUNTER
----- Message from Amish Burroughs sent at 5/22/2025  1:25 PM EDT -----  Can you let her know her monitor is good. Some short runs of SVT (non significant and don't cause syncope or extreme dizziness), I reviewed her meds, she was taken off coreg. I don't see a specific reason for that. She can be restarted on small dose toprol XL may be 12.5 if she had low BP

## 2025-06-06 ENCOUNTER — PATIENT OUTREACH (OUTPATIENT)
Dept: CARE COORDINATION | Facility: CLINIC | Age: 65
End: 2025-06-06
Payer: MEDICARE
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
## 2025-06-19 ENCOUNTER — APPOINTMENT (OUTPATIENT)
Dept: PHARMACY | Facility: HOSPITAL | Age: 65
End: 2025-06-19
Payer: MEDICARE

## 2025-06-25 ENCOUNTER — PATIENT OUTREACH (OUTPATIENT)
Dept: CARE COORDINATION | Facility: CLINIC | Age: 65
End: 2025-06-25
Payer: MEDICARE

## 2025-06-25 DIAGNOSIS — Z12.31 ENCOUNTER FOR SCREENING MAMMOGRAM FOR BREAST CANCER: ICD-10-CM

## 2025-06-25 NOTE — PROGRESS NOTES
Outreach call to patient to check in 30 days after hospital discharge to support smooth transition of care.  Left voicemail message with CM name and contact number. No additional outreach needed at this time.     Joslyn Cervantes RN/CM  [facility]  [phone]

## 2025-07-22 ENCOUNTER — OFFICE VISIT (OUTPATIENT)
Dept: CARDIOLOGY | Facility: HOSPITAL | Age: 65
End: 2025-07-22
Payer: MEDICARE

## 2025-07-22 VITALS
HEART RATE: 69 BPM | BODY MASS INDEX: 35.51 KG/M2 | WEIGHT: 213.41 LBS | SYSTOLIC BLOOD PRESSURE: 117 MMHG | DIASTOLIC BLOOD PRESSURE: 78 MMHG | OXYGEN SATURATION: 96 %

## 2025-07-22 DIAGNOSIS — E66.812 CLASS 2 SEVERE OBESITY DUE TO EXCESS CALORIES WITH SERIOUS COMORBIDITY AND BODY MASS INDEX (BMI) OF 35.0 TO 35.9 IN ADULT: ICD-10-CM

## 2025-07-22 DIAGNOSIS — I10 PRIMARY HYPERTENSION: ICD-10-CM

## 2025-07-22 DIAGNOSIS — I42.8 NICM (NONISCHEMIC CARDIOMYOPATHY) (MULTI): Primary | ICD-10-CM

## 2025-07-22 DIAGNOSIS — E66.01 CLASS 2 SEVERE OBESITY DUE TO EXCESS CALORIES WITH SERIOUS COMORBIDITY AND BODY MASS INDEX (BMI) OF 35.0 TO 35.9 IN ADULT: ICD-10-CM

## 2025-07-22 PROCEDURE — G2211 COMPLEX E/M VISIT ADD ON: HCPCS | Performed by: STUDENT IN AN ORGANIZED HEALTH CARE EDUCATION/TRAINING PROGRAM

## 2025-07-22 PROCEDURE — 3078F DIAST BP <80 MM HG: CPT | Performed by: STUDENT IN AN ORGANIZED HEALTH CARE EDUCATION/TRAINING PROGRAM

## 2025-07-22 PROCEDURE — 1159F MED LIST DOCD IN RCRD: CPT | Performed by: STUDENT IN AN ORGANIZED HEALTH CARE EDUCATION/TRAINING PROGRAM

## 2025-07-22 PROCEDURE — 99215 OFFICE O/P EST HI 40 MIN: CPT | Performed by: STUDENT IN AN ORGANIZED HEALTH CARE EDUCATION/TRAINING PROGRAM

## 2025-07-22 PROCEDURE — 99212 OFFICE O/P EST SF 10 MIN: CPT

## 2025-07-22 PROCEDURE — 3074F SYST BP LT 130 MM HG: CPT | Performed by: STUDENT IN AN ORGANIZED HEALTH CARE EDUCATION/TRAINING PROGRAM

## 2025-07-22 RX ORDER — ACETAMINOPHEN 500 MG
1 TABLET ORAL DAILY
Qty: 1 KIT | Refills: 0 | Status: SHIPPED | OUTPATIENT
Start: 2025-07-22

## 2025-07-22 NOTE — PROGRESS NOTES
Primary Care Physician: Ashley Bradley MD   Date of Visit: 07/22/2025  1:20 PM EDT  Type of Visit: Post dc Follow up       Chief Complaint:  Chief Complaint   Patient presents with    Follow-up     No new concerns          HPI  Gavi Tejeda 64 y.o. female with hx of NICM, based on cath done 2007, LVEF 30% and last reported LVEF 2020 was 40-45%, normal stress nuc 2018, TTE 4/2024 with LVEF 35-40%, LHC done with mild non-obstructive disease with normal LVED and no intervention done, HTN, COPD, MERRY, obesity s/p bariatric surgery/sleeve, smoker, TIA, cancer breast treated with neoadjuvant chemotherapy 6 cycles of Adriamycin 5-FU and Cytoxan followed by bilateral mastectomy, then finished 5 years Aromasin therapy.     She was admitted last May after she felt dizzy, flushed and lightheaded, the fall on the floor, and hit her head. She went the ED and was admitted for syncope. coreg was discontinued. Repeated echo 5/2025 with improved LVEF 45-50%. She was wearing the heart monitor during the event and while she was admitted. Her heart monitor came back unremarkable. She was suppose to start toprol XL but she has not started it yet.  She gained some weight after she quit smoking.   No le edema  She feels better. No sob   No chest pain or angina  She remains active  No dizziness or syncope since she left the hospital.  She wants to lose weight and requesting GLP1 injections     Review of Systems   Review of Systems   12 points review of systems are negative expect for the above    Social History:  Social History     Socioeconomic History    Marital status: Single     Spouse name: Not on file    Number of children: Not on file    Years of education: Not on file    Highest education level: Not on file   Occupational History    Not on file   Tobacco Use    Smoking status: Former     Current packs/day: 1.00     Average packs/day: 1 pack/day for 45.6 years (45.6 ttl pk-yrs)     Types: Cigarettes     Start date: 1/1/1980      Passive exposure: Current    Smokeless tobacco: Never   Vaping Use    Vaping status: Never Used   Substance and Sexual Activity    Alcohol use: Not Currently     Comment: SOCIALLY    Drug use: Never    Sexual activity: Defer   Other Topics Concern    Not on file   Social History Narrative    ** Merged History Encounter **          Social Drivers of Health     Financial Resource Strain: Low Risk  (5/17/2025)    Overall Financial Resource Strain (CARDIA)     Difficulty of Paying Living Expenses: Not very hard   Food Insecurity: No Food Insecurity (5/16/2025)    Hunger Vital Sign     Worried About Running Out of Food in the Last Year: Never true     Ran Out of Food in the Last Year: Never true   Transportation Needs: No Transportation Needs (5/17/2025)    PRAPARE - Transportation     Lack of Transportation (Medical): No     Lack of Transportation (Non-Medical): No   Physical Activity: Not on file   Stress: Not on file   Social Connections: Not on file   Intimate Partner Violence: Not At Risk (5/16/2025)    Humiliation, Afraid, Rape, and Kick questionnaire     Fear of Current or Ex-Partner: No     Emotionally Abused: No     Physically Abused: No     Sexually Abused: No   Housing Stability: Low Risk  (5/17/2025)    Housing Stability Vital Sign     Unable to Pay for Housing in the Last Year: No     Number of Times Moved in the Last Year: 0     Homeless in the Last Year: No        Past Medical History:  Medical History[1]    Past Surgical History:  Surgical History[2]    Family History:  Family History[3]     Objective:       5/18/2025     8:41 PM 5/18/2025     9:46 PM 5/19/2025    12:16 AM 5/19/2025     3:43 AM 5/19/2025     8:35 AM 5/19/2025    12:29 PM 7/22/2025     1:34 PM   Vitals   Systolic 116  111 114 135 144 117   Diastolic 61  75 75 83 89 78   BP Location Right arm  Right arm Right arm Right arm Right arm    Heart Rate 68  67 60 66 72 69   Temp 36.5 °C (97.7 °F)  36.4 °C (97.5 °F) 36.5 °C (97.7 °F) 35.9 °C (96.6  "°F) 35.9 °C (96.6 °F)    Resp 18  18 18 17 18    Height  1.651 m (5' 5\")        Weight (lb)  210     213.41   BMI  34.95 kg/m2     35.51 kg/m2   BSA (m2)  2.09 m2     2.11 m2   Visit Report       Report      Constitutional:       Appearance: Healthy appearance. Not in distress.   Neck:      Vascular:  JVD normal.   Pulmonary:      Effort: Pulmonary effort is normal.      Breath sounds: Normal breath sounds. No wheezing. No rhonchi. No rales.   Cardiovascular:      Normal rate. Regular rhythm. Normal S1. Normal S2.       Murmurs: There is no murmur.      No gallop.  N No rub.   Pulses:     Intact distal pulses.   Edema:     Peripheral edema absent.   Abdominal:      General: Bowel sounds are normal.      Palpations: Abdomen is soft.      Tenderness: There is no abdominal tenderness.   Musculoskeletal: Normal range of motion.         General: No tenderness.   Skin:     General: Skin is warm and dry.   Neurological:      General: No focal deficit present.      Mental Status: Alert and oriented to person, place and time.   Psychiatry:     Normal Mood     Allergies:  Allergies[4]    Medications:  Current Outpatient Medications   Medication Instructions    atorvastatin (LIPITOR) 40 mg, oral, Daily    blood pressure monitor (Blood Pressure Kit) kit 1 kit, miscellaneous, Daily    empagliflozin (JARDIANCE) 10 mg, oral, Daily    metoprolol succinate XL (TOPROL-XL) 12.5 mg, oral, Daily, Do not crush or chew.    nicotine (Nicoderm CQ) 21 mg/24 hr patch 1 patch, transdermal, Every 24 hours    pediatric multivitamin tablet,chewable 1 tablet, oral, Daily    sacubitriL-valsartan (Entresto) 24-26 mg tablet 1 tablet, oral, 2 times daily        Labs and Imaging:     I reviewed the following labs  Lab Results   Component Value Date    WBC 6.7 05/17/2025    HGB 13.1 05/17/2025    HCT 37.9 05/17/2025     05/17/2025    CHOL 276 (H) 04/29/2025    TRIG 92 04/29/2025    HDL 72 04/29/2025    ALT 15 05/17/2025    AST 27 05/17/2025    NA " 139 05/17/2025    K 4.2 05/17/2025     (H) 05/17/2025    CREATININE 0.74 05/17/2025    BUN 20 05/17/2025    CO2 25 05/17/2025    TSH 1.96 04/29/2025    INR 1.0 04/30/2024    HGBA1C 5.0 08/03/2023       I reviewed the following:  EKG:   Recent Labs     05/16/25  1455 04/22/25  1259 04/10/24  1326   ATRRATE 71 65 74   VENTRATE 71 65 74   PRINT 138 134 128   QRSDUR 82 84 86   QTCFRED 391 402 401   QTCCALCB 402 407 415     Encounter Date: 05/16/25   Electrocardiogram, 12-lead PRN ACS symptoms   Result Value    Ventricular Rate 71    Atrial Rate 71    AZ Interval 138    QRS Duration 82    QT Interval 370    QTC Calculation(Bazett) 402    P Axis 49    R Axis 7    T Axis 236    QRS Count 12    Q Onset 221    P Onset 152    P Offset 205    T Offset 406    QTC Fredericia 391    Narrative    Normal sinus rhythm  Left ventricular hypertrophy with repolarization abnormality ( R in aVL )  Abnormal ECG  When compared with ECG of 22-APR-2025 13:03, (unconfirmed)  No significant change was found  See ED provider note for full interpretation and clinical correlation  Confirmed by Keyana Li ([id number]) on 5/19/2025 10:28:04 AM     Echocardiogram:   Recent Labs     05/17/25  1447 05/12/25  0840 06/07/24  1339   EF 50 48  --    LVIDD 5.64 5.31 6.02   RV 22.9 18.8 17.0   RVFRWALLPKSP  --  11.20 10.40   TAPSE  --  2.0 1.8   Transthoracic Echo (TTE) Complete 05/12/2025    [facility], [id number] [place] [address]  Tel [phone] and Fax [phone]    TRANSTHORACIC ECHOCARDIOGRAM REPORT      Patient Name:       PADILLA Zelaya Physician:    Ev Penaloza MD  Study Date:         5/12/2025           Ordering Provider:    Ev PENALOZA  MRN/PID:            [id number]            Fellow:  Accession#:         [id number]        Nurse:  Date of Birth/Age:  1960 / 64     Sonographer:          Apoorva Ellsion  years                                     RDCS  Gender assigned  at  F                   Additional Staff:  Birth:  Height:             165.10 cm           Admit Date:           5/12/2025  Weight:             95.71 kg            Admission Status:     Outpatient  BSA / BMI:          2.02 m2 / 35.11     Encounter#:           [id number]  kg/m2  Blood Pressure:     109/53 mmHg         Department Location:  [facility] Non  Invasive    Study Type:    TRANSTHORACIC ECHO (TTE) COMPLETE  Diagnosis/ICD: Other cardiomyopathies-I42.8  Indication:    NICM, dyspnea  CPT Code:      Echo Complete w Full Doppler-[id number]    Patient History:  Smoker:           Former.  Pertinent         Cardiomyopathy, Dyspnea, HTN, TIA, Hyperlipidemia, COPD and  History:          NICM.    Study Detail: The following Echo studies were performed: 2D, M-Mode, Doppler and  color flow. The patient was awake.      PHYSICIAN INTERPRETATION:  Left Ventricle: Left ventricular ejection fraction is mildly decreased, calculated by Meehan's biplane at 48%. There is global hypokinesis of the left ventricle with minor regional variations. The left ventricular cavity size is mildly dilated. There is mildly increased septal and mildly increased posterior left ventricular wall thickness. Spectral Doppler shows a Grade I (impaired relaxation pattern) of left ventricular diastolic filling with normal left atrial filling pressure.  Left Atrium: The left atrial size is normal.  Right Ventricle: The right ventricle is normal in size. There is normal right ventricular global systolic function.  Right Atrium: The right atrium is normal in size.  Aortic Valve: The aortic valve is trileaflet. There is mild aortic valve cusp calcification. The aortic valve dimensionless index is 0.67. There is no evidence of aortic valve regurgitation. The peak instantaneous gradient of the aortic valve is 12 mmHg. The mean gradient of the aortic valve is 7 mmHg.  Mitral Valve: The mitral valve is mildly thickened. There is mild mitral valve  regurgitation.  Tricuspid Valve: The tricuspid valve is structurally normal. There is trace to mild tricuspid regurgitation. The Doppler estimated RVSP is within normal limits at 18.8 mmHg.  Pulmonic Valve: The pulmonic valve is structurally normal. There is physiologic pulmonic valve regurgitation.  Pericardium: Trivial pericardial effusion.  Aorta: The aortic root is normal. The aortic root is at the upper limits of normal size.  In comparison to the previous echocardiogram(s): Compared with study dated 6/7/2024, LVEF has improved.      CONCLUSIONS:  1. Left ventricular ejection fraction is mildly decreased, calculated by Meehan's biplane at 48%.  2. There is global hypokinesis of the left ventricle with minor regional variations.  3. Spectral Doppler shows a Grade I (impaired relaxation pattern) of left ventricular diastolic filling with normal left atrial filling pressure.  4. Left ventricular cavity size is mildly dilated.  5. There is normal right ventricular global systolic function.  6. Right ventricular systolic pressure is within normal limits.    QUANTITATIVE DATA SUMMARY:    2D MEASUREMENTS:          Normal Ranges:  Ao Root d:       3.10 cm  (2.0-3.7cm)  IVSd:            1.03 cm  (0.6-1.1cm)  LVPWd:           1.12 cm  (0.6-1.1cm)  LVIDd:           5.31 cm  (3.9-5.9cm)  LVIDs:           4.53 cm  LV Mass Index:   109 g/m2  LVEDV Index:     65 ml/m2  LV % FS          14.7 %      LEFT ATRIUM:                  Normal Ranges:  LA Vol A4C:        37.9 ml    (22+/-6mL/m2)  LA Vol A2C:        44.2 ml  LA Vol BP:         41.7 ml  LA Vol Index A4C:  18.7ml/m2  LA Vol Index A2C:  21.8 ml/m2  LA Vol Index BP:   20.6 ml/m2  LA Area A4C:       14.9 cm2  LA Area A2C:       15.8 cm2  LA Major Axis A4C: 5.0 cm  LA Major Axis A2C: 4.8 cm  LA Volume Index:   19.8 ml/m2  LA Vol A4C:        35.5 ml  LA Vol A2C:        43.5 ml  LA Vol Index BSA:  19.5 ml/m2      RIGHT ATRIUM:          Normal Ranges:  RA Area A4C:  14.3  cm2      M-MODE MEASUREMENTS:         Normal Ranges:  Ao Root:             3.30 cm (2.0-3.7cm)  AoV Exc:             1.90 cm (1.5-2.5cm)  LAs:                 3.60 cm (2.7-4.0cm)      AORTA MEASUREMENTS:         Normal Ranges:  AoV Exc:            1.90 cm (1.5-2.5cm)  Ao Sinus, d:        3.10 cm (2.1-3.5cm)  Asc Ao, d:          3.60 cm (2.1-3.4cm)      LV SYSTOLIC FUNCTION:  Normal Ranges:  EF-A4C View:    53 % (>=55%)  EF-A2C View:    45 %  EF-Biplane:     48 %  LV EF Reported: 48 %      LV DIASTOLIC FUNCTION:             Normal Ranges:  MV Peak E:             0.67 m/s    (0.7-1.2 m/s)  MV Peak A:             0.92 m/s    (0.42-0.7 m/s)  E/A Ratio:             0.73        (1.0-2.2)  MV e'                  0.075 m/s   (>8.0)  MV lateral e'          0.09 m/s  MV medial e'           0.06 m/s  MV A Dur:              151.00 msec  E/e' Ratio:            8.97        (<8.0)  PulmV Sys Eduardo:         42.40 cm/s  PulmV George Eduardo:        28.60 cm/s  PulmV S/D Eduardo:         1.50  PulmV A Revs Eduardo:      26.20 cm/s  PulmV A Revs Dur:      111.00 msec      MITRAL VALVE:          Normal Ranges:  MV DT:        253 msec (150-240msec)      AORTIC VALVE:                      Normal Ranges:  AoV Vmax:                1.71 m/s  (<=1.7m/s)  AoV Peak P.7 mmHg (<20mmHg)  AoV Mean P.0 mmHg  (1.7-11.5mmHg)  LVOT Max Eduardo:            1.17 m/s  (<=1.1m/s)  AoV VTI:                 36.90 cm  (18-25cm)  LVOT VTI:                24.70 cm  LVOT Diameter:           2.00 cm   (1.8-2.4cm)  AoV Area, VTI:           2.10 cm2  (2.5-5.5cm2)  AoV Area,Vmax:           2.15 cm2  (2.5-4.5cm2)  AoV Dimensionless Index: 0.67      RIGHT VENTRICLE:  RV Basal 2.51 cm  RV Mid   1.93 cm  RV Major 8.1 cm  TAPSE:   20.3 mm  RV s'    0.11 m/s      TRICUSPID VALVE/RVSP:          Normal Ranges:  Peak TR Velocity:     1.99 m/s  RV Syst Pressure:     19 mmHg  (< 30mmHg)  IVC Diam:             1.40 cm      PULMONIC VALVE:          Normal  Ranges:  PV Max Eduardo:     0.8 m/s  (0.6-0.9m/s)  PV Max P.8 mmHg      PULMONARY VEINS:  PulmV A Revs Dur: 111.00 msec  PulmV A Revs Eduardo: 26.20 cm/s  PulmV Egorge Eduardo:   28.60 cm/s  PulmV S/D Eduardo:    1.50  PulmV Sys Eduardo:    42.40 cm/s      Ev Penaloza MD  Electronically signed on 2025 at 5:41:40 PM        ** Final **    Coronary Angiography:   LVGRAM 2024    [facility], Cath Lab, [address] [place] [address]    Cardiovascular Catheterization Report    Patient Name:      PADILLA COKER      Performing Physician:  [id number] Curry MD  Study Date:        2024             Verifying Physician:   Kristen Curry MD  MRN/PID:           [id number]              Cardiologist/Co-Scrub:  Accession#:        [id number]          Ordering Provider:     Ev PENALOZA  Date of Birth/Age: 1960 / 63 years Cardiologist:  Gender:            F                     Fellow:  Encounter#:        [id number]            Surgeon:      Study: Left Heart Cath      Indications:  PADILLA COKER is a 64 year old female who presents with hypertension, chronic pulmonary disease, cerebrovascular accident and a chest pain assessment of atypical angina. Left ventricular dysfunction, cardiomyopathy and worsening angina.    Procedure Description:  After infiltration with 2% Lidocaine, the right radial artery was cannulated with a modified Seldinger technique. Subsequently a 6 Jordanian sheath was placed in the right radial artery. Selective coronary catheterization was performed using a 5 Fr catheter(s) exchanged over a guide wire to cannulate the coronary arteries.  Multiple injections of contrast were made into the left and right coronary arteries with angiograms recorded in multiple projections. After completion of the procedure, the arterial sheath was pulled and a TR Band Radial Compression Device was utilized to obtain patent hemostasis.    Coronary  Angiography:  The coronary circulation is right dominant.    Left Main Coronary Artery:  The left main coronary artery showed no significant disease or stenosis greater than 30%.    Left Anterior Descending Coronary Artery Distribution:  The distal left anterior descending coronary artery showed 40% stenosis.    Circumflex Coronary Artery Distribution:  The circumflex revealed no significant disease or stenosis greater than 30%. The proximal 1st obtuse marginal branch showed 40% stenosis.    Right Coronary Artery Distribution:    The RCA showed no significant disease or stenosis greater than 30%.    Coronary Lesion Summary:  Vessel   Stenosis   Vessel Segment  LAD    40% stenosis     distal  OM 1   40% stenosis    proximal      Hemo Personnel:  +-------------------+---------+  Name               Duty       +-------------------+---------+  Anthony Curry MDPNAOMI MD 1  +-------------------+---------+      Hemodynamic Pressures:    +----+---------------+----------+-------------+--------------+-------+---------+  Site   Date Time   Phase NameSystolic mmHgDiastolic mmHgED mmHgMean mmHg  +----+---------------+----------+-------------+--------------+-------+---------+    LV      7/19/2024  AIR REST          113            12     13                   10:52:00 AM                                                       +----+---------------+----------+-------------+--------------+-------+---------+    LV      7/19/2024  AIR REST          111            11     12                   10:52:08 AM                                                       +----+---------------+----------+-------------+--------------+-------+---------+   LVp      7/19/2024  AIR REST          110             9     11                   10:52:14 AM                                                       +----+---------------+----------+-------------+--------------+-------+---------+   AOp       7/19/2024  AIR REST          117            83              99          10:52:21 AM                                                       +----+---------------+----------+-------------+--------------+-------+---------+    AO      7/19/2024  AIR REST          117            80              96          10:52:36 AM                                                       +----+---------------+----------+-------------+--------------+-------+---------+      Cardiac Cath Post Procedure Notes:  Post Procedure Diagnosis: Mild nonobstructive disease.  Blood Loss:               Estimated blood loss during the procedure was 5cc mls.  Specimens Removed:        Number of specimen(s) removed: none.      Recommendations:  Maximize medical therapy.    ____________________________________________________________________________________  CONCLUSIONS:  1. Left main: no significant angiographic disease.  2. LAD: 40% diffuse distal disease.  3. LCx: 40% OM1 proximal stenosis.  4. RCA: no significant angiographic disease.  5. LVEDP 12mmHg, no significant angiographic disease.    ICD 10 Codes:  Cardiomyopathy, unspecified-I42.9    CPT Codes:  Left Heart Cath (visualization of coronaries) and LV-80908; Moderate Sedation Services initial 15 minutes patient >5 years-72692    [id number] Anthony Curry MD  Performing Physician  Electronically signed by [id number] Anthony Curry MD on 7/21/2024 at 7:39:53 PM          ** Final **    Right Heart Cath: No results found for this or any previous visit from the past 1800 days.    Cardiac Scoring: No results found for this or any previous visit from the past 1800 days.    Cardiac MRI: No results found for this or any previous visit from the past 1800 days.    Nuclear:No results found for this or any previous visit from the past 1800 days.    Metabolic Stress: No results found for this or any previous visit from the past 1800 days.        The ASCVD Risk score (Mesa DK, et al.,  2019) failed to calculate for the following reasons:    Risk score cannot be calculated because patient has a medical history suggesting prior/existing ASCVD     Assessment/Plan:   1. NICM (nonischemic cardiomyopathy) (Multi)  Referral to Clinical Pharmacy      2. Primary hypertension  blood pressure monitor (Blood Pressure Kit) kit      3. Class 2 severe obesity due to excess calories with serious comorbidity and body mass index (BMI) of 35.0 to 35.9 in adult           Gavi Tejeda 64 y.o. female with hx of exsmoker, NICM, based on cath done 2007, LVEF 30% and last reported LVEF 2020 was 40-45%, normal stress nuc 2018, TTE 4/2024 with LVEF 35-40%, LHC done with mild non-obstructive disease with normal LVED and no intervention done, HTN, COPD, MERRY, obesity s/p bariatric surgery/sleeve, smoker, TIA, cancer breast treated with neoadjuvant chemotherapy 6 cycles of Adriamycin 5-FU and Cytoxan followed by bilateral mastectomy, then finished 5 years Aromasin therapy.    She was admitted last May after she felt flushed then passed out and fell on the floor, and hit her head. She went the ED and was admitted for syncope. coreg was discontinued. Repeated echo 5/2025 with improved LVEF 45-50%. She was wearing the heart monitor during the event and while she was admitted. Her heart monitor came back unremarkable. She was suppose to start toprol XL but she has not started it yet.    NYHA class I. Euvolemic on exam.     BP is well controlled     Plan  Ensure medication compliance     Continue liptor      Restart Toprol XL 12.5 mg every day   Will give BP machine and advised her to monitor her BP  Continue Entresto, consider cutting dose into half if low BP        Continue Jardiance    Refer to clinical pharmacy for GLP1 initiation, if covered, we might need to stop Jardiance to avoid hypoglycemia (she is not diabetic)      # Cardiovascular Health Maintenance  - Physical Activity: Encourage 10-15K steps per day  - Healthy  Diet: Avoid high fat and high sodium foods (processed meats / fast foods). Consider a mediterranean or DASH diet, emphasizing vegetables, fruits, whole grains, lean proteins  - Avoidance of smoking and smoke exposure    Time Spent: I spent 40 minutes reviewing medical testing, obtaining medical history and counselling and educating on diagnosis and documenting clinical encounter.        ____________________________________________________________  Amish Burroughs MD   of Medicine  Senior Attending Physician   Division of Cardiovascular Medicine   [facility] [place]  [facility]         [1]   Past Medical History:  Diagnosis Date    Bariatric surgery status 01/09/2017    S/P bariatric surgery    Breast cancer     Encounter for preprocedural laboratory examination 04/23/2019    Blood tests prior to treatment or procedure    Encounter for screening for malignant neoplasm of colon 05/07/2018    Encounter for screening colonoscopy    Hyperlipidemia     Hypertension     Morbid (severe) obesity due to excess calories (Multi) 04/11/2017    Morbid obesity with BMI of 45.0-49.9, adult    Nausea with vomiting, unspecified 03/03/2017    Post-operative nausea and vomiting    Nausea with vomiting, unspecified 11/11/2016    Post-operative nausea and vomiting    Other acute postprocedural pain 04/11/2017    Acute post-operative pain    Other conditions influencing health status     Breast Cancer    Personal history of nicotine dependence 05/07/2018    History of nicotine dependence    Stroke (Multi)     Vision loss    [2]   Past Surgical History:  Procedure Laterality Date    CARDIAC CATHETERIZATION N/A 7/19/2024    Procedure: Left Heart Cath;  Surgeon: Anthony Curry MD;  Location: [facility] Cardiac Cath Lab;  Service: Cardiovascular;  Laterality: N/A;    CATARACT EXTRACTION EXTRACAPSULAR W/ INTRAOCULAR LENS IMPLANTATION Bilateral     GALLBLADDER SURGERY  04/24/2013    Gallbladder  Surgery    KNEE SURGERY  04/24/2013    Knee Surgery Left    LASIK      MASTECTOMY  05/24/2013    Breast Surgery Mastectomy    MASTECTOMY, RADICAL Left     MR HEAD ANGIO WO IV CONTRAST  09/19/2019    MR HEAD ANGIO WO IV CONTRAST [facility]    OTHER SURGICAL HISTORY  01/09/2017    Gastric Surgery For Morbid Obesity Laparoscopic Longitudinal Gastrectomy    OTHER SURGICAL HISTORY  02/29/2016    Breast Surgery Reconstruction   [3] No family history on file.  [4]   Allergies  Allergen Reactions    Amoxicillin Swelling    Diph,Pertuss(Acel),Tet Vac(Pf) Swelling    Tetanus Immune Globulin Swelling     severe to arm where injections was given    Tetanus And Diphther. Tox (Pf) Rash

## 2025-07-24 NOTE — PROGRESS NOTES
"  Pharmacist Clinic: Cardiology Management    Gavi Tejeda is a 64 y.o. female was referred to Clinical Pharmacy Team for weight loss management.     Referring Provider: Amish Burroughs MD    THIS IS A NEW PATIENT APPOINTMENT. PATIENT WILL BE ESTABLISHING CARE WITH CLINICAL PHARMACY.    Appointment was completed by Gavi who was reached at primary number.    Allergies Reviewed? Yes    Allergies[1]    Medical History[2]    Medications Ordered Prior to Encounter[3]      RELEVANT LAB RESULTS:  Lab Results   Component Value Date    BILITOT 0.5 05/17/2025    CALCIUM 9.1 05/17/2025    CO2 25 05/17/2025     (H) 05/17/2025    CREATININE 0.74 05/17/2025    GLUCOSE 94 05/17/2025    ALKPHOS 77 05/17/2025    K 4.2 05/17/2025    PROT 6.0 (L) 05/17/2025     05/17/2025    AST 27 05/17/2025    ALT 15 05/17/2025    BUN 20 05/17/2025    ANIONGAP 10 05/17/2025    MG 2.08 05/02/2024    PHOS 3.0 05/02/2024    ALBUMIN 3.9 05/17/2025     Lab Results   Component Value Date    TRIG 92 04/29/2025    CHOL 276 (H) 04/29/2025    LDLCALC 183 (H) 04/29/2025    HDL 72 04/29/2025     No results found for: \"BMCBC\", \"CBCDIF\"     PHARMACEUTICAL ASSESSMENT:    MEDICATION RECONCILIATION    Was a medication reconciliation completed at this visit? Yes  Home Pharmacy Reviewed? Yes, describe: [facility]     Removed:  - atorvastatin    Drug Interactions? No    Medication Documentation Review Audit       Reviewed by Melba Luz, PharmD (Pharmacist) on 07/25/25 at 1425      Medication Order Taking? Sig Documenting Provider Last Dose Status   atorvastatin (Lipitor) 40 mg tablet [id number]  Take 1 tablet (40 mg) by mouth once daily.   Patient not taking: Reported on 7/25/2025    Amish Burroughs MD  Active   blood pressure monitor (Blood Pressure Kit) kit [id number]  1 kit once daily. Amish Burroughs MD  Active   empagliflozin (Jardiance) 10 mg tablet [id number] Yes Take 1 tablet (10 mg) by mouth once daily. Amish Burroughs MD  Active "   metoprolol succinate XL (Toprol-XL) 25 mg 24 hr tablet [id number] Yes Take 0.5 tablets (12.5 mg) by mouth once daily. Do not crush or chew. Amish Burroughs MD  Active   nicotine (Nicoderm CQ) 21 mg/24 hr patch [id number]  Place 1 patch over 24 hours on the skin once every 24 hours.   Patient not taking: Reported on 2025    Amish Burroughs MD   24 2359   pediatric multivitamin tablet,chewable [id number] Yes Chew 1 tablet once daily. Historical Provider, MD  Active   sacubitriL-valsartan (Entresto) 24-26 mg tablet [id number] Yes Take 1 tablet by mouth 2 times a day. Amish Burroughs MD  Active                    DISEASE MANAGEMENT ASSESSMENT:     WEIGHT LOSS ASSESSMENT     Wt Readings from Last 3 Encounters:   25 96.8 kg (213 lb 6.5 oz)   25 95.3 kg (210 lb)   25 95.3 kg (210 lb)     BMI Readings from Last 3 Encounters:   25 35.51 kg/m²   25 34.95 kg/m²   25 34.95 kg/m²       Recorded Home Weight(s): 211 lb, goal weight 150-155 lb   Diet:   Snacks on junk food throughout the day   Tries to limit to 2 meals a day but has been snacking a lot   Exercise Routine: 3-4 times a week going to the gym   Additional Contributory Factors just stopped smoking has gained 30 lbs       Relevant PMH:  PMH of Pancreatitis? No   PMH of Retinopathy? No   PMH of MTC? No     COUNSELING:   - Counseled patient on MOA, expectations, side effects, duration of therapy, contraindications, administration, and monitoring parameters  - Answered all patient questions and concerns; provided [facility] phone number if issues/questions arise    DISCUSSION/NOTES:   Today was an initial visit to establish with clinical pharmacy. Patient medications and allergies were reviewed and updated.  Patient was referred to clinical pharmacy to hopefully initiate a GLP1.   Patient has a history of cardiomyapathy with an EF 50%, patient also has a history of MERRY.   Will attempt to start Wegovy under cardiomyapathy. If  PA denied will try zepbound for MERRY.   Will follow up in 1 week to see results of PA and if approved current copay.     ASSESSMENT:    Assessment/Plan   Problem List Items Addressed This Visit       TIA (transient ischemic attack) - Primary    Relevant Medications    semaglutide, weight loss, (Wegovy) 0.25 mg/0.5 mL pen injector    Other Relevant Orders    Referral to Clinical Pharmacy    Cerebrovascular disease    Relevant Medications    semaglutide, weight loss, (Wegovy) 0.25 mg/0.5 mL pen injector    Other Relevant Orders    Referral to Clinical Pharmacy    MERRY (obstructive sleep apnea)    Relevant Medications    semaglutide, weight loss, (Wegovy) 0.25 mg/0.5 mL pen injector    Other Relevant Orders    Referral to Clinical Pharmacy    Cardiomyopathy    EF 50% patient would benefit from a GLP1 for weight loss and cardiac protection.   Start wegovy 0.25mg subcutaneous q7ds.   Will monitor weight and GI upset.          Relevant Medications    semaglutide, weight loss, (Wegovy) 0.25 mg/0.5 mL pen injector    Other Relevant Orders    Referral to Clinical Pharmacy         RECOMMENDATIONS/PLAN:    START  Wegovy 0.25mg subcutaneous q7ds   Follow up in 1 week to monitor status of PA     Last Appnt with Referring Provider: 7/22/25  Next Appnt with Referring Provider: 1/20/26  Clinical Pharmacist follow up: 8/1/25  Type of Encounter: Virtual    Eugene WolffD    Verbal consent to manage patient's drug therapy was obtained from the patient . They were informed they may decline to participate or withdraw from participation in pharmacy services at any time.    Continue all meds under the continuation of care with the referring provider and clinical pharmacy team.           [1]   Allergies  Allergen Reactions    Amoxicillin Itching     In 2000s     Tetanus Immune Globulin Swelling     severe to arm where injections was given   [2]   Past Medical History:  Diagnosis Date    Bariatric surgery status 01/09/2017     S/P bariatric surgery    Breast cancer     Encounter for preprocedural laboratory examination 04/23/2019    Blood tests prior to treatment or procedure    Encounter for screening for malignant neoplasm of colon 05/07/2018    Encounter for screening colonoscopy    Hyperlipidemia     Hypertension     Morbid (severe) obesity due to excess calories (Multi) 04/11/2017    Morbid obesity with BMI of 45.0-49.9, adult    Nausea with vomiting, unspecified 03/03/2017    Post-operative nausea and vomiting    Nausea with vomiting, unspecified 11/11/2016    Post-operative nausea and vomiting    Other acute postprocedural pain 04/11/2017    Acute post-operative pain    Other conditions influencing health status     Breast Cancer    Personal history of nicotine dependence 05/07/2018    History of nicotine dependence    Stroke (Multi)     Vision loss    [3]   Current Outpatient Medications on File Prior to Visit   Medication Sig Dispense Refill    empagliflozin (Jardiance) 10 mg tablet Take 1 tablet (10 mg) by mouth once daily. 90 tablet 3    metoprolol succinate XL (Toprol-XL) 25 mg 24 hr tablet Take 0.5 tablets (12.5 mg) by mouth once daily. Do not crush or chew. 15 tablet 11    pediatric multivitamin tablet,chewable Chew 1 tablet once daily.      sacubitriL-valsartan (Entresto) 24-26 mg tablet Take 1 tablet by mouth 2 times a day. 180 tablet 3    atorvastatin (Lipitor) 40 mg tablet Take 1 tablet (40 mg) by mouth once daily. (Patient not taking: Reported on 7/25/2025) 90 tablet 3    blood pressure monitor (Blood Pressure Kit) kit 1 kit once daily. 1 kit 0    nicotine (Nicoderm CQ) 21 mg/24 hr patch Place 1 patch over 24 hours on the skin once every 24 hours. (Patient not taking: Reported on 7/25/2025) 30 patch 0     No current facility-administered medications on file prior to visit.

## 2025-07-25 ENCOUNTER — TELEMEDICINE (OUTPATIENT)
Dept: PHARMACY | Facility: HOSPITAL | Age: 65
End: 2025-07-25
Payer: MEDICARE

## 2025-07-25 DIAGNOSIS — G47.33 OSA (OBSTRUCTIVE SLEEP APNEA): ICD-10-CM

## 2025-07-25 DIAGNOSIS — I42.9 CARDIOMYOPATHY, UNSPECIFIED TYPE (MULTI): ICD-10-CM

## 2025-07-25 DIAGNOSIS — I67.9 CEREBROVASCULAR DISEASE: ICD-10-CM

## 2025-07-25 DIAGNOSIS — G45.9 TIA (TRANSIENT ISCHEMIC ATTACK): Primary | ICD-10-CM

## 2025-07-25 PROCEDURE — RXMED WILLOW AMBULATORY MEDICATION CHARGE

## 2025-07-25 RX ORDER — SEMAGLUTIDE 0.25 MG/.5ML
0.25 INJECTION, SOLUTION SUBCUTANEOUS WEEKLY
Qty: 2 ML | Refills: 1 | Status: SHIPPED | OUTPATIENT
Start: 2025-07-25

## 2025-07-25 NOTE — Clinical Note
Hello, · Patient was referred to clinical pharmacy to hopefully initiate a GLP1.  · Patient has a history of cardiomyapathy with an EF 50%, patient also has a history of MERRY.   o Will attempt to start Wegovy under cardiomyapathy. If PA denied will try zepbound for MERRY.  · Will follow up in 1 week to see results of PA and if approved current copay.

## 2025-07-26 ENCOUNTER — PHARMACY VISIT (OUTPATIENT)
Dept: PHARMACY | Facility: CLINIC | Age: 65
End: 2025-07-26
Payer: MEDICARE

## 2025-08-01 ENCOUNTER — APPOINTMENT (OUTPATIENT)
Dept: PHARMACY | Facility: HOSPITAL | Age: 65
End: 2025-08-01
Payer: MEDICARE

## 2025-08-05 NOTE — PROGRESS NOTES
"  Pharmacist Clinic: Cardiology Management    Gavi Tejeda is a 64 y.o. female was referred to Clinical Pharmacy Team for weight loss management.     Referring Provider: Amish Burroughs MD    THIS IS A FOLLOW UP PATIENT APPOINTMENT. AT LAST VISIT ON 7/25/25 WITH PHARMACIST (Melba Luz).    Appointment was completed by Gavi who was reached at primary number.    REVIEW OF PAST APPNT (IF APPLICABLE):   Last visit was an initial visit to establish with clinical pharmacy. Patient medications and allergies were reviewed and updated.  Patient was referred to clinical pharmacy to hopefully initiate a GLP1.   Patient has a history of cardiomyapathy with an EF 50%, patient also has a history of MERRY.   Will attempt to start Wegovy under cardiomyapathy. If PA denied will try zepbound for MERRY.   Since last visit Wegovy PA was approved with a $0 copay.     Allergies Reviewed? No    Allergies[1]    Medical History[2]    Medications Ordered Prior to Encounter[3]      RELEVANT LAB RESULTS:  Lab Results   Component Value Date    BILITOT 0.5 05/17/2025    CALCIUM 9.1 05/17/2025    CO2 25 05/17/2025     (H) 05/17/2025    CREATININE 0.74 05/17/2025    GLUCOSE 94 05/17/2025    ALKPHOS 77 05/17/2025    K 4.2 05/17/2025    PROT 6.0 (L) 05/17/2025     05/17/2025    AST 27 05/17/2025    ALT 15 05/17/2025    BUN 20 05/17/2025    ANIONGAP 10 05/17/2025    MG 2.08 05/02/2024    PHOS 3.0 05/02/2024    ALBUMIN 3.9 05/17/2025     Lab Results   Component Value Date    TRIG 92 04/29/2025    CHOL 276 (H) 04/29/2025    LDLCALC 183 (H) 04/29/2025    HDL 72 04/29/2025     No results found for: \"BMCBC\", \"CBCDIF\"     PHARMACEUTICAL ASSESSMENT:    MEDICATION RECONCILIATION    Was a medication reconciliation completed at this visit? No  Home Pharmacy Reviewed? Yes, describe: [facility]     Drug Interactions? No    Medication Documentation Review Audit       Reviewed by Melba Luz, PharmD (Pharmacist) on 07/25/25 at 1425 "      Medication Order Taking? Sig Documenting Provider Last Dose Status   atorvastatin (Lipitor) 40 mg tablet [id number]  Take 1 tablet (40 mg) by mouth once daily.   Patient not taking: Reported on 2025    Amish Burroughs MD  Active   blood pressure monitor (Blood Pressure Kit) kit [id number]  1 kit once daily. Amish Burroughs MD  Active   empagliflozin (Jardiance) 10 mg tablet [id number] Yes Take 1 tablet (10 mg) by mouth once daily. Amish Burroughs MD  Active   metoprolol succinate XL (Toprol-XL) 25 mg 24 hr tablet [id number] Yes Take 0.5 tablets (12.5 mg) by mouth once daily. Do not crush or chew. Amish Burroughs MD  Active   nicotine (Nicoderm CQ) 21 mg/24 hr patch [id number]  Place 1 patch over 24 hours on the skin once every 24 hours.   Patient not taking: Reported on 2025    Amish Burroughs MD   24 2359   pediatric multivitamin tablet,chewable [id number] Yes Chew 1 tablet once daily. Historical Provider, MD  Active   sacubitriL-valsartan (Entresto) 24-26 mg tablet [id number] Yes Take 1 tablet by mouth 2 times a day. Amish Burroughs MD  Active                    DISEASE MANAGEMENT ASSESSMENT:     WEIGHT LOSS ASSESSMENT     Wt Readings from Last 3 Encounters:   25 96.8 kg (213 lb 6.5 oz)   25 95.3 kg (210 lb)   25 95.3 kg (210 lb)     BMI Readings from Last 3 Encounters:   25 35.51 kg/m²   25 34.95 kg/m²   25 34.95 kg/m²       Recorded Home Weight(s): lost half a pound   : 211 lb, goal weight 150-155 lb   Diet:   Snacks on junk food throughout the day   Tries to limit to 2 meals a day but has been snacking a lot   Exercise Routine: 3-4 times a week going to the gym   Additional Contributory Factors just stopped smoking has gained 30 lbs    CURRENT PHARMACOTHERAPY   Wegovy 0.25mg subcutaneous Saturday       Affordability/Accessibility: $0 copay   Adherence/Organization: reports adherence   Adverse Reactions: none reported     Relevant PMH:  PMH of  Pancreatitis? No   PMH of Retinopathy? No   PMH of MTC? No    PATIENT EDUCATION/GOALS  Wegovy must be refrigerated. If necessary, the pen may be kept at room temperature for up to 28 days. Each box comes with 4 pens, one for each week.     1.  Remove the pen from the refrigerator. Keep the pen cap on until you are ready to inject.   2.  Check the pen label to make sure that it is the correct medication and dose. Also check to make sure that the solution is not cloudy, discolored or have particles in it.   3. Prior to administration wash your hands.   4. Choose your injection site either your abdomen or thigh (if someone else is giving the injection the upper arm can also be used).   5. Ensure to change (rotate) injection sites each week. You can use the same area of the body but inject in a different part of that area.   6. Once the injection site has been selected use an alcohol swab to clean off the area.   7. Remove the grey cap and press firmly onto the injection site.   8. Push the pen against the skin until the yellow bar has stopped moving. You will hear two clicks; one indicates that the injection has started, and the other indicates an ongoing injection. The injection process will take about 10 seconds.  9. Do not rub the area after administration   10. Remove pen and discard in a sharps container (such as a milk jug, detergent bottle, or coffee canister)   11. Injections should be done on the same day each week, if you forget you have up to 5 days to take your dose.    -  Counseled patient on Wegovy MOA, expectations, side effects, duration of therapy, administration, and monitoring parameters.  - Advised patient that they may experience improved satiety after meals and portion sizes of meals may be reduced as doses of Wegovy increase.  - Counseled patient to avoid foods that are fatty/oily as this may precipitate the nausea/GI upset that may occur with new start Wegovy.      COUNSELING:   - Counseled  patient on MOA, expectations, side effects, duration of therapy, contraindications, administration, and monitoring parameters  - Answered all patient questions and concerns; provided [facility] phone number if issues/questions arise       DISCUSSION/NOTES:   Since last appointment Wegovy has been approved through patient's insurance for no charge.   Patient is on week 3 of 0.25mg of Wegovy and states she has not noticed any side effects and has only lost a pound. I explained this is normal with the very low dose. Will increase dose to 0.5mg weekly.   Follow up in 1 month for possible additional dose adjustment.     ASSESSMENT:    Assessment/Plan   Problem List Items Addressed This Visit       TIA (transient ischemic attack)    Relevant Medications    semaglutide, weight loss, (Wegovy) 0.5 mg/0.5 mL pen injector    Other Relevant Orders    Referral to Clinical Pharmacy    Cerebrovascular disease    Relevant Medications    semaglutide, weight loss, (Wegovy) 0.5 mg/0.5 mL pen injector    Other Relevant Orders    Referral to Clinical Pharmacy    MERRY (obstructive sleep apnea)    Relevant Medications    semaglutide, weight loss, (Wegovy) 0.5 mg/0.5 mL pen injector    Other Relevant Orders    Referral to Clinical Pharmacy    Cardiomyopathy - Primary    EF 50% patient would benefit from a GLP1 for weight loss and cardiac protection.   INCREASE wegovy from 0.25mg to 0.5mg subcutaneous q7ds.   Will monitor weight and GI upset.          Relevant Medications    semaglutide, weight loss, (Wegovy) 0.5 mg/0.5 mL pen injector    Other Relevant Orders    Referral to Clinical Pharmacy         RECOMMENDATIONS/PLAN:    INCREASE  Wegovy to 0.5mg subcutaneous q7d   Follow up in 1 month     Last Appnt with Referring Provider: 7/22/25  Next Appnt with Referring Provider: 1/20/26  Clinical Pharmacist follow up: 9/5/25  Type of Encounter: Virtual    Melba Luz, PharmD    Verbal consent to manage patient's drug therapy was obtained from  the patient . They were informed they may decline to participate or withdraw from participation in pharmacy services at any time.    Continue all meds under the continuation of care with the referring provider and clinical pharmacy team.           [1]   Allergies  Allergen Reactions    Amoxicillin Itching     In 2000s     Tetanus Immune Globulin Swelling     severe to arm where injections was given   [2]   Past Medical History:  Diagnosis Date    Bariatric surgery status 01/09/2017    S/P bariatric surgery    Breast cancer     Encounter for preprocedural laboratory examination 04/23/2019    Blood tests prior to treatment or procedure    Encounter for screening for malignant neoplasm of colon 05/07/2018    Encounter for screening colonoscopy    Hyperlipidemia     Hypertension     Morbid (severe) obesity due to excess calories (Multi) 04/11/2017    Morbid obesity with BMI of 45.0-49.9, adult    Nausea with vomiting, unspecified 03/03/2017    Post-operative nausea and vomiting    Nausea with vomiting, unspecified 11/11/2016    Post-operative nausea and vomiting    Other acute postprocedural pain 04/11/2017    Acute post-operative pain    Other conditions influencing health status     Breast Cancer    Personal history of nicotine dependence 05/07/2018    History of nicotine dependence    Stroke (Multi)     Vision loss    [3]   Current Outpatient Medications on File Prior to Visit   Medication Sig Dispense Refill    atorvastatin (Lipitor) 40 mg tablet Take 1 tablet (40 mg) by mouth once daily. (Patient not taking: Reported on 7/25/2025) 90 tablet 3    blood pressure monitor (Blood Pressure Kit) kit 1 kit once daily. 1 kit 0    empagliflozin (Jardiance) 10 mg tablet Take 1 tablet (10 mg) by mouth once daily. 90 tablet 3    metoprolol succinate XL (Toprol-XL) 25 mg 24 hr tablet Take 0.5 tablets (12.5 mg) by mouth once daily. Do not crush or chew. 15 tablet 11    nicotine (Nicoderm CQ) 21 mg/24 hr patch Place 1 patch over  24 hours on the skin once every 24 hours. (Patient not taking: Reported on 7/25/2025) 30 patch 0    pediatric multivitamin tablet,chewable Chew 1 tablet once daily.      sacubitriL-valsartan (Entresto) 24-26 mg tablet Take 1 tablet by mouth 2 times a day. 180 tablet 3    [DISCONTINUED] semaglutide, weight loss, (Wegovy) 0.25 mg/0.5 mL pen injector Inject 0.25 mg under the skin 1 (one) time per week. 2 mL 1     No current facility-administered medications on file prior to visit.

## 2025-08-08 ENCOUNTER — TELEMEDICINE (OUTPATIENT)
Dept: PHARMACY | Facility: HOSPITAL | Age: 65
End: 2025-08-08
Payer: MEDICARE

## 2025-08-08 DIAGNOSIS — I42.9 CARDIOMYOPATHY, UNSPECIFIED TYPE (MULTI): Primary | ICD-10-CM

## 2025-08-08 DIAGNOSIS — I67.9 CEREBROVASCULAR DISEASE: ICD-10-CM

## 2025-08-08 DIAGNOSIS — G45.9 TIA (TRANSIENT ISCHEMIC ATTACK): ICD-10-CM

## 2025-08-08 DIAGNOSIS — G47.33 OSA (OBSTRUCTIVE SLEEP APNEA): ICD-10-CM

## 2025-08-08 PROCEDURE — RXMED WILLOW AMBULATORY MEDICATION CHARGE

## 2025-08-08 RX ORDER — SEMAGLUTIDE 0.5 MG/.5ML
0.5 INJECTION, SOLUTION SUBCUTANEOUS
Qty: 2 ML | Refills: 1 | Status: SHIPPED | OUTPATIENT
Start: 2025-08-08

## 2025-08-08 NOTE — Clinical Note
Hello, · Since last appointment Wegovy has been approved through patient's insurance for no charge.  · Patient is on week 3 of 0.25mg of Wegovy and states she has not noticed any side effects and has only lost a pound. I explained this is normal with the very low dose. Will increase dose to 0.5mg weekly.  · Follow up in 1 month for possible additional dose adjustment.

## 2025-08-08 NOTE — ASSESSMENT & PLAN NOTE
EF 50% patient would benefit from a GLP1 for weight loss and cardiac protection.   INCREASE wegovy from 0.25mg to 0.5mg subcutaneous q7ds.   Will monitor weight and GI upset.

## 2025-08-16 ENCOUNTER — PHARMACY VISIT (OUTPATIENT)
Dept: PHARMACY | Facility: CLINIC | Age: 65
End: 2025-08-16
Payer: MEDICARE

## 2025-09-05 ENCOUNTER — APPOINTMENT (OUTPATIENT)
Dept: PHARMACY | Facility: HOSPITAL | Age: 65
End: 2025-09-05
Payer: MEDICARE

## 2025-10-03 ENCOUNTER — APPOINTMENT (OUTPATIENT)
Dept: PHARMACY | Facility: HOSPITAL | Age: 65
End: 2025-10-03
Payer: MEDICARE

## 2025-10-29 ENCOUNTER — APPOINTMENT (OUTPATIENT)
Dept: OPHTHALMOLOGY | Facility: CLINIC | Age: 65
End: 2025-10-29
Payer: COMMERCIAL

## (undated) DEVICE — IMPLANTABLE DEVICE: Type: IMPLANTABLE DEVICE | Site: FACE | Status: NON-FUNCTIONAL

## (undated) DEVICE — NEEDLE, ELECTRODE, ELECTROSURGICAL, INSULATED

## (undated) DEVICE — TR BAND, RADIAL COMPRESSION, STANDARD, 24CM

## (undated) DEVICE — PAD, GROUNDING, ELECTROSURGICAL, W/9 FT CABLE, POLYHESIVE II, ADULT, LF

## (undated) DEVICE — REST, HEAD, BAGEL, 9 IN

## (undated) DEVICE — TR BAND, RADIAL COMPRESSION, LONG, 29CM

## (undated) DEVICE — COVER, PLASTIC, MAYO STAND, 29.5IN X 55.5IN

## (undated) DEVICE — MANIFOLD, 4 PORT NEPTUNE STANDARD

## (undated) DEVICE — CATHETER, OPTITORQUE, 5FR, TIG1, 1H/100CM

## (undated) DEVICE — NEEDLE, ENTRY, PERCUTANEOUS, 21 G X 2.5 CM

## (undated) DEVICE — SUTURE, VICRYL, 4-0, 18 IN, UNDYED BR PS-2

## (undated) DEVICE — COVER, CART, 45 X 27 X 48 IN, CLEAR

## (undated) DEVICE — SEALANT, HEMOSTATIC, FLOSEAL, 10 ML

## (undated) DEVICE — INTRODUCER SHEATH, GLIDESHEATH, 6FR 10CM

## (undated) DEVICE — ANGIO KIT, LEFT HEART, LF, CUSTOM

## (undated) DEVICE — GOWN, ASTOUND, XL

## (undated) DEVICE — Device